# Patient Record
Sex: FEMALE | Race: WHITE | Employment: FULL TIME | URBAN - METROPOLITAN AREA
[De-identification: names, ages, dates, MRNs, and addresses within clinical notes are randomized per-mention and may not be internally consistent; named-entity substitution may affect disease eponyms.]

---

## 2017-01-04 ENCOUNTER — TELEPHONE (OUTPATIENT)
Dept: INTERNAL MEDICINE CLINIC | Facility: CLINIC | Age: 57
End: 2017-01-04

## 2017-01-04 ENCOUNTER — APPOINTMENT (OUTPATIENT)
Dept: LAB | Facility: HOSPITAL | Age: 57
End: 2017-01-04
Attending: INTERNAL MEDICINE
Payer: COMMERCIAL

## 2017-01-04 DIAGNOSIS — R30.0 DYSURIA: Primary | ICD-10-CM

## 2017-01-04 DIAGNOSIS — R30.0 DYSURIA: ICD-10-CM

## 2017-01-04 LAB
BILIRUB UR QL: NEGATIVE
COLOR UR: YELLOW
GLUCOSE UR-MCNC: NEGATIVE MG/DL
KETONES UR-MCNC: NEGATIVE MG/DL
NITRITE UR QL STRIP.AUTO: NEGATIVE
PH UR: 5 [PH] (ref 5–8)
PROT UR-MCNC: NEGATIVE MG/DL
RBC #/AREA URNS AUTO: 7 /HPF
SP GR UR STRIP: 1.02 (ref 1–1.03)
UROBILINOGEN UR STRIP-ACNC: <2
VIT C UR-MCNC: NEGATIVE MG/DL
WBC #/AREA URNS AUTO: 747 /HPF

## 2017-01-04 PROCEDURE — 87186 SC STD MICRODIL/AGAR DIL: CPT

## 2017-01-04 PROCEDURE — 87086 URINE CULTURE/COLONY COUNT: CPT

## 2017-01-04 PROCEDURE — 87077 CULTURE AEROBIC IDENTIFY: CPT

## 2017-01-04 PROCEDURE — 81001 URINALYSIS AUTO W/SCOPE: CPT

## 2017-01-04 NOTE — TELEPHONE ENCOUNTER
[facility] for further triage    Please transfer x [phone] until 4:45pm or [phone] anytime. Thank you.

## 2017-01-04 NOTE — TELEPHONE ENCOUNTER
[facility]    Please transfer x [phone] until 5pm or [phone] anytime. Thank you. To relay MD message below. U/A and C/S orders pended for pt notification.

## 2017-01-04 NOTE — TELEPHONE ENCOUNTER
Pt contacted and MD message relayed to pt. Order for U/A and C/S sent and pt verbalized that she will give her urine today. Pt provided with the information for the Uristat and instructed how to take the medication.     Instructed pt to continue drinking

## 2017-01-04 NOTE — TELEPHONE ENCOUNTER
Patient calling and states she has been experiencing symptoms of UTI on and off over the past 3 weeks. Denied appt but is requesting to speak with RN or  about this.

## 2017-01-04 NOTE — TELEPHONE ENCOUNTER
UA and culture and sensitivity. Stop all alcohol including wine. Minimal caffeine. Continue to drink plenty of fluids. Uristat 1 tablet 2 times daily over-the-counter till culture and sensitivities obtained. Call if any fevers or chills or back pain.

## 2017-01-04 NOTE — TELEPHONE ENCOUNTER
Please note/advise. Thank you. Pt states that she was treated for a UTI about 3 weeks but she is still having sl pressure pain, urgency, frequency, dysuria on urination. Pt states that the pain intermittently wakes her up at night.     Pt states that she

## 2017-01-12 ENCOUNTER — TELEPHONE (OUTPATIENT)
Dept: INTERNAL MEDICINE CLINIC | Facility: CLINIC | Age: 57
End: 2017-01-12

## 2017-01-12 DIAGNOSIS — N39.0 RECURRENT UTI: Primary | ICD-10-CM

## 2017-01-12 RX ORDER — AMOXICILLIN AND CLAVULANATE POTASSIUM 875; 125 MG/1; MG/1
1 TABLET, FILM COATED ORAL 2 TIMES DAILY
Qty: 20 TABLET | Refills: 0 | Status: SHIPPED | OUTPATIENT
Start: 2017-01-12 | End: 2017-02-06

## 2017-01-12 NOTE — TELEPHONE ENCOUNTER
rx sent to pharm. Pt contacted. appt set for 1/16. Pt would like to know if she is going to  be referred to urology as this is her 3rd round of antibiotics for UTIs since Nov.  And earlier she was hospitalized for a UTI.   If she is being referred, does

## 2017-01-12 NOTE — TELEPHONE ENCOUNTER
Talked to the patient. She tells me that urology referral was suggested by the nursing staff and she did not request it.   Explained that the current infection is an enterococcus species not aggressive enough to even need treatment but she is being placed

## 2017-01-12 NOTE — TELEPHONE ENCOUNTER
Pt stts has hx of UTI's . 2 weeks ago started again with pain, burning, pressure, urgency and frequency. No fever. Taking Uristat. Still has frequency and lower back pain  but better then before. Had UA done 1 week ago and is waiting for results. Please rev

## 2017-01-12 NOTE — TELEPHONE ENCOUNTER
Patient reports experiencing symptoms of recurring UTI. She has history of UTI and kidney infection. She reports having recently urinalysis and culture performed but has not yet received response regarding results of that testing performed 1 week ago.  Resu

## 2017-01-16 ENCOUNTER — OFFICE VISIT (OUTPATIENT)
Dept: INTERNAL MEDICINE CLINIC | Facility: CLINIC | Age: 57
End: 2017-01-16

## 2017-01-16 VITALS
WEIGHT: 166 LBS | BODY MASS INDEX: 32.59 KG/M2 | HEIGHT: 60 IN | HEART RATE: 73 BPM | SYSTOLIC BLOOD PRESSURE: 109 MMHG | DIASTOLIC BLOOD PRESSURE: 71 MMHG | RESPIRATION RATE: 16 BRPM

## 2017-01-16 DIAGNOSIS — R19.7 DIARRHEA, UNSPECIFIED TYPE: ICD-10-CM

## 2017-01-16 DIAGNOSIS — N39.0 RECURRENT UTI: Primary | ICD-10-CM

## 2017-01-16 PROCEDURE — 99214 OFFICE O/P EST MOD 30 MIN: CPT | Performed by: INTERNAL MEDICINE

## 2017-01-16 PROCEDURE — 99212 OFFICE O/P EST SF 10 MIN: CPT | Performed by: INTERNAL MEDICINE

## 2017-01-16 RX ORDER — FLUCONAZOLE 150 MG/1
TABLET ORAL
Qty: 2 TABLET | Refills: 0 | Status: ON HOLD | OUTPATIENT
Start: 2017-01-16 | End: 2017-02-09

## 2017-01-16 NOTE — PATIENT INSTRUCTIONS
Problem List Items Addressed This Visit        Unprioritized    Diarrhea     Intermittent episodes of diarrhea about 1-2 loose bowel movements per day. Has lower abdominal discomfort and rectal discomfort. Last seen by gastroenterology in spring 2016.   Kevin Waller

## 2017-01-16 NOTE — PROGRESS NOTES
HPI:    Patient ID: Radha Aggarwal is a 64year old female. UTI  This is a recurrent problem.  Progression since onset: has been drinking a lot of water and feels well when hydrated but has had symptoms of bladder pressure and slight burn with urination (1.524 m), weight 166 lb (75.297 kg), not currently breastfeeding. Body mass index is 32.42 kg/(m^2).    Wt Readings from Last 6 Encounters:  01/16/17 : 166 lb (75.297 kg)  12/22/16 : 162 lb 9.6 oz (73.755 kg)  11/10/16 : 158 lb (71.668 kg)  11/03/16 : 152 gastroenterology in spring 2016. We will consider repeating a stool for culture and C. difficile to evaluate for any occult infections  Continue on probiotics 1-2 times daily as discussed.          Relevant Orders    GI STOOL PANEL BY PCR    Recurrent UTI

## 2017-02-06 ENCOUNTER — APPOINTMENT (OUTPATIENT)
Dept: LAB | Facility: HOSPITAL | Age: 57
End: 2017-02-06
Attending: INTERNAL MEDICINE
Payer: COMMERCIAL

## 2017-02-06 ENCOUNTER — TELEPHONE (OUTPATIENT)
Dept: INTERNAL MEDICINE CLINIC | Facility: CLINIC | Age: 57
End: 2017-02-06

## 2017-02-06 DIAGNOSIS — R35.0 URINARY FREQUENCY: ICD-10-CM

## 2017-02-06 DIAGNOSIS — R35.0 URINARY FREQUENCY: Primary | ICD-10-CM

## 2017-02-06 LAB
BILIRUB UR QL: NEGATIVE
COLOR UR: YELLOW
GLUCOSE UR-MCNC: NEGATIVE MG/DL
KETONES UR-MCNC: NEGATIVE MG/DL
NITRITE UR QL STRIP.AUTO: POSITIVE
PH UR: 6 [PH] (ref 5–8)
PROT UR-MCNC: NEGATIVE MG/DL
RBC #/AREA URNS AUTO: 4 /HPF
SP GR UR STRIP: 1.01 (ref 1–1.03)
UROBILINOGEN UR STRIP-ACNC: <2
VIT C UR-MCNC: NEGATIVE MG/DL
WBC #/AREA URNS AUTO: 199 /HPF

## 2017-02-06 PROCEDURE — 87086 URINE CULTURE/COLONY COUNT: CPT

## 2017-02-06 PROCEDURE — 87088 URINE BACTERIA CULTURE: CPT

## 2017-02-06 PROCEDURE — 81001 URINALYSIS AUTO W/SCOPE: CPT

## 2017-02-06 PROCEDURE — 87186 SC STD MICRODIL/AGAR DIL: CPT

## 2017-02-06 RX ORDER — AMOXICILLIN AND CLAVULANATE POTASSIUM 875; 125 MG/1; MG/1
1 TABLET, FILM COATED ORAL 2 TIMES DAILY
Qty: 20 TABLET | Refills: 0 | Status: SHIPPED | OUTPATIENT
Start: 2017-02-06 | End: 2017-02-08

## 2017-02-06 NOTE — TELEPHONE ENCOUNTER
Patient called and informed with understanding. She will completer the urine test today.     Discussed wiping front to back, avoiding sweets and caffeine , no hot tub baths or Tight clothes and stated understood,

## 2017-02-06 NOTE — TELEPHONE ENCOUNTER
Reason for Call/Chief Complaint:  urinary frequency,  dysuria  Onset: Friday 2/2/3/17  Nursing Assessment/Associated Symptoms:   Afebrile.  Urinary frequency, dysuria, + low back pain, denies hematuria  Pt started AZO on Saturday 2/4/17 and has amoxicillin

## 2017-02-06 NOTE — TELEPHONE ENCOUNTER
Pt stts she his having symptoms of an UTI again   Pt stts she has had a few of them in the last couple months  Pt stts she has the [address] already and can she just use that or does she need to have a culture   Please advise

## 2017-02-08 ENCOUNTER — HOSPITAL ENCOUNTER (INPATIENT)
Facility: HOSPITAL | Age: 57
LOS: 1 days | Discharge: HOME | DRG: 690 | End: 2017-02-09
Attending: HOSPITALIST | Admitting: HOSPITALIST
Payer: COMMERCIAL

## 2017-02-08 ENCOUNTER — TELEPHONE (OUTPATIENT)
Dept: INTERNAL MEDICINE CLINIC | Facility: CLINIC | Age: 57
End: 2017-02-08

## 2017-02-08 PROCEDURE — 99222 1ST HOSP IP/OBS MODERATE 55: CPT | Performed by: HOSPITALIST

## 2017-02-08 NOTE — TELEPHONE ENCOUNTER
Reason for Call/Chief Complaint: Stomach cramping, upper abdominal pain, vomiting and liquid stools, UTI Sx returned with burning and pressure, possible reaction to Abx?   Onset: Monday  Nursing Assessment/Associated Symptoms:  Pt states that she started th verbalizes understanding and agrees with plan.

## 2017-02-08 NOTE — TELEPHONE ENCOUNTER
Pt directed. Advised to discontinue the amoxicillin. Change to Bactrim DS as she has tolerated that in the recent past.  Medication sent into the pharmacy.   Advised to call back in about 2 days

## 2017-02-08 NOTE — TELEPHONE ENCOUNTER
Per Tiana Mc, admission nurse, no beds available. Pt will need to go through ER. Pt contacted. Stts she does not want to go through ER. It will cost her extra $200 and she will have to wait for 4 hours. Any other recommendations?

## 2017-02-08 NOTE — TELEPHONE ENCOUNTER
[facility]    Please transfer x [phone] Aline Croft RN) until 5pm today or [phone] anytime. Thank you.

## 2017-02-08 NOTE — TELEPHONE ENCOUNTER
Sue Fox from the lab called in w/STAT urine culture results:  2/8/2017  3:04 PM - [facility]       Culture      >100,000 CFU/ML Escherichia coli  ESBL Pos (A)       LUCY made aware.   LUCY spoke w/pt via phone to discuss need for hospital admission for

## 2017-02-08 NOTE — TELEPHONE ENCOUNTER
Pt said that after taking the antibiotics she began to have diarrhea and started throwing up. Pt said that she has never had a problem with taking the antibiotic before.

## 2017-02-08 NOTE — TELEPHONE ENCOUNTER
Patient contacted–will need a direct admission–please check if admitting nurse will be able to get as a bed for this patient. Failure of outpatient antibiotic treatment–resistant to medications.   ESBL E. coli on urine culture

## 2017-02-09 VITALS
SYSTOLIC BLOOD PRESSURE: 103 MMHG | TEMPERATURE: 98 F | OXYGEN SATURATION: 97 % | DIASTOLIC BLOOD PRESSURE: 51 MMHG | BODY MASS INDEX: 30.71 KG/M2 | HEIGHT: 60 IN | HEART RATE: 79 BPM | RESPIRATION RATE: 18 BRPM | WEIGHT: 156.44 LBS

## 2017-02-09 LAB
ALBUMIN SERPL BCP-MCNC: 3.1 G/DL (ref 3.5–4.8)
ALBUMIN/GLOB SERPL: 1.1 {RATIO} (ref 1–2)
ALP SERPL-CCNC: 54 U/L (ref 32–100)
ALT SERPL-CCNC: 15 U/L (ref 14–54)
ANION GAP SERPL CALC-SCNC: 6 MMOL/L (ref 0–18)
AST SERPL-CCNC: 15 U/L (ref 15–41)
BASOPHILS # BLD: 0.1 K/UL (ref 0–0.2)
BASOPHILS NFR BLD: 1 %
BILIRUB SERPL-MCNC: 0.4 MG/DL (ref 0.3–1.2)
BUN SERPL-MCNC: 10 MG/DL (ref 8–20)
BUN/CREAT SERPL: 13 (ref 10–20)
CALCIUM SERPL-MCNC: 8.5 MG/DL (ref 8.5–10.5)
CHLORIDE SERPL-SCNC: 110 MMOL/L (ref 95–110)
CO2 SERPL-SCNC: 25 MMOL/L (ref 22–32)
CREAT SERPL-MCNC: 0.77 MG/DL (ref 0.5–1.5)
EOSINOPHIL # BLD: 0.1 K/UL (ref 0–0.7)
EOSINOPHIL NFR BLD: 3 %
ERYTHROCYTE [DISTWIDTH] IN BLOOD BY AUTOMATED COUNT: 13 % (ref 11–15)
GLOBULIN PLAS-MCNC: 2.9 G/DL (ref 2.5–3.7)
GLUCOSE SERPL-MCNC: 96 MG/DL (ref 70–99)
HCT VFR BLD AUTO: 37.2 % (ref 35–48)
HGB BLD-MCNC: 12.4 G/DL (ref 12–16)
LYMPHOCYTES # BLD: 2.3 K/UL (ref 1–4)
LYMPHOCYTES NFR BLD: 39 %
MCH RBC QN AUTO: 29 PG (ref 27–32)
MCHC RBC AUTO-ENTMCNC: 33.3 G/DL (ref 32–37)
MCV RBC AUTO: 87 FL (ref 80–100)
MONOCYTES # BLD: 0.6 K/UL (ref 0–1)
MONOCYTES NFR BLD: 10 %
NEUTROPHILS # BLD AUTO: 2.9 K/UL (ref 1.8–7.7)
NEUTROPHILS NFR BLD: 48 %
OSMOLALITY UR CALC.SUM OF ELEC: 291 MOSM/KG (ref 275–295)
PLATELET # BLD AUTO: 315 K/UL (ref 140–400)
PMV BLD AUTO: 7.5 FL (ref 7.4–10.3)
POTASSIUM SERPL-SCNC: 3.8 MMOL/L (ref 3.3–5.1)
PROT SERPL-MCNC: 6 G/DL (ref 5.9–8.4)
RBC # BLD AUTO: 4.28 M/UL (ref 3.7–5.4)
SODIUM SERPL-SCNC: 141 MMOL/L (ref 136–144)
WBC # BLD AUTO: 5.9 K/UL (ref 4–11)

## 2017-02-09 RX ORDER — ONDANSETRON 2 MG/ML
4 INJECTION INTRAMUSCULAR; INTRAVENOUS EVERY 6 HOURS PRN
Status: DISCONTINUED | OUTPATIENT
Start: 2017-02-09 | End: 2017-02-09

## 2017-02-09 RX ORDER — ACETAMINOPHEN 325 MG/1
650 TABLET ORAL EVERY 6 HOURS PRN
Status: DISCONTINUED | OUTPATIENT
Start: 2017-02-09 | End: 2017-02-09

## 2017-02-09 RX ORDER — SODIUM CHLORIDE 9 MG/ML
INJECTION, SOLUTION INTRAVENOUS CONTINUOUS
Status: DISCONTINUED | OUTPATIENT
Start: 2017-02-09 | End: 2017-02-09

## 2017-02-09 RX ORDER — NITROFURANTOIN 25; 75 MG/1; MG/1
100 CAPSULE ORAL 2 TIMES DAILY
Qty: 10 CAPSULE | Refills: 0 | Status: SHIPPED | OUTPATIENT
Start: 2017-02-09 | End: 2017-02-14

## 2017-02-09 RX ORDER — SODIUM CHLORIDE 0.9 % (FLUSH) 0.9 %
SYRINGE (ML) INJECTION
Status: COMPLETED
Start: 2017-02-09 | End: 2017-02-09

## 2017-02-09 RX ORDER — ENOXAPARIN SODIUM 100 MG/ML
40 INJECTION SUBCUTANEOUS DAILY
Status: DISCONTINUED | OUTPATIENT
Start: 2017-02-09 | End: 2017-02-09

## 2017-02-09 NOTE — PLAN OF CARE
Patient/Family Goals    • Patient/Family Long Term Goal Progressing    • Patient/Family Short Term Goal Progressing          No c/o of pain. Post void residual 7mL. VSS. Patient to be discharged home today. Discharge and follow up instructions reviewed.  Al

## 2017-02-09 NOTE — CONSULTS
INFECTIOUS DISEASE CONSULT NOTE    Valenciaanalisa Shukla Patient Status:  Inpatient    2/15/1960 MRN [id number]   Location [facility] 5SW/SE Attending Blaise Berry MD   [facility] Day # 1 PCP Mitali Swenson Floater, vitreous 10/7/2014   • Presbyopia OU 10/7/2014   • Age-related nuclear cataract of both eyes 10/7/2014   • MGD (meibomian gland dysfunction) 10/7/2014   • Recurrent cold sores    • Lipid screening 5/31/2014     per NG   • Vertigo    • E-coli UTI temperature source Oral, resp. rate 18, height 5' (1.524 m), weight 156 lb 7 oz (70.96 kg), SpO2 97 %, not currently breastfeeding. General: Alert, oriented, NAD  HEENT: Moist mucous membranes. Neck: No lymphadenopathy. Supple.   Respiratory: Clear to mg PO BI x5 days  - has follow up with Urology next Wednesay  - consider Urogyn eval as outpatient  - reviewed labs, micro, imaging reports  - discussed with patient, sister, RN, Dr. Marshall Ryan    Thank you for allowing me to participate in the care of this pa

## 2017-02-09 NOTE — H&P
[facility]  Patient Status:  Inpatient    2/15/1960 MRN [id number]   Location [facility] 5SW/SE Attending Sana John MD   Hosp Day # 1 PCP Mark Regalado MD     Date:  2017  Date of Sister      breast  cancer 2015   • Gastro-Intestinal Disorder Brother      Celiac disease   • Hypertension Other      Family H/o   • Thyroid disease Other      Family H/o: Grave's disease [Most of siblings (8)][   • Diabetes Other      Nephew   • Glaucoma thyromegaly. Respiratory:  Lungs are clear to auscultation, respirations are non-labored, breath sounds are equal, symmetrical chest wall expansion. Cardiovascular:  Normal rate, regular rhythm, no murmur, no edema.   Gastrointestinal:  Soft, non-tender,

## 2017-02-09 NOTE — TELEPHONE ENCOUNTER
Pharmacy was contacted and informed of Dr. Jeni Wheeler message below.  The pharmacist verbalized understanding

## 2017-02-09 NOTE — PROGRESS NOTES
[facility]    Progress Note    Nathalia Ho Patient Status:  Inpatient    2/15/1960 MRN [id number]   Location [facility] 5SW/SE Attending Anuel Mcdaniels MD   Hosp Day # 1 PCP Yulia Reyes MD     Subjective:     Jersontio 02/09/2017   K 3.8 02/09/2017    02/09/2017   CO2 25 02/09/2017   GLU 96 02/09/2017   CA 8.5 02/09/2017   ALB 3.1* 02/09/2017   ALKPHO 54 02/09/2017   BILT 0.4 02/09/2017   TP 6.0 02/09/2017   AST 15 02/09/2017   ALT 15 02/09/2017   TSH 1.94 05/31/20

## 2017-02-09 NOTE — DISCHARGE SUMMARY
[facility]    Discharge Summary    Francia Loyd Patient Status:  Inpatient    2/15/1960 MRN [id number]   Location [facility] 5SW/SE Attending Oamira Luz MD   Hosp Day # 1 PCP Katharine Guzmán MD     Date of Admission:  progressive worsening aggravated despite drinking plenty of fluids and cranberry juice.  She denies any relieving factors describes her symptoms as similar to her previous episodes of UTI.  She is seen her primary care physician UA done in the office indic [address] [facility], [address] [place] [id number]     Phone:  [phone]    - Nitrofurantoin Monohyd Macro 100 MG Caps              [facility]  2/9/2017  3:18 PM    Greater than 30 minutes spent on preparation and coordination of this discharge

## 2017-02-09 NOTE — DISCHARGE PLANNING
RN states pt may need iv abx at dc. ID consult is pending with Dr. Francia Chávez. SW sent tentative referral to [facility] to check pt's options, in case the abx are needed.     josiah grace,LSW [phone]

## 2017-02-09 NOTE — TELEPHONE ENCOUNTER
Annette stts that they received a script for Sulfamethoxazole-TMP -160 MG Oral Tab per tablet & Nitrofurantoin Monohyd Macro 100 MG Oral Cap which are both used for treating bladder infections. Should they dispense both?

## 2017-02-10 ENCOUNTER — TELEPHONE (OUTPATIENT)
Dept: INTERNAL MEDICINE UNIT | Facility: HOSPITAL | Age: 57
End: 2017-02-10

## 2017-02-10 NOTE — TELEPHONE ENCOUNTER
I spoke with Pt to schedule a HFU visit with Dr Ganesh Chapa but the Dr schedule has no openings until mid March . Dr Ganesh Chapa not in office so i will contact her on Monday and call pt back after speaking with Dr Bao Gunn.

## 2017-02-10 NOTE — TELEPHONE ENCOUNTER
Please schedule patient for a hospital follow up appointment with pcp . Pt was discharged on 2/9/17 .

## 2017-02-15 ENCOUNTER — LAB ENCOUNTER (OUTPATIENT)
Dept: LAB | Facility: HOSPITAL | Age: 57
End: 2017-02-15
Attending: UROLOGY
Payer: COMMERCIAL

## 2017-02-15 DIAGNOSIS — N39.0 URINARY TRACT INFECTION, SITE NOT SPECIFIED: Primary | ICD-10-CM

## 2017-02-15 PROCEDURE — 87086 URINE CULTURE/COLONY COUNT: CPT

## 2017-02-16 ENCOUNTER — TELEPHONE (OUTPATIENT)
Dept: INTERNAL MEDICINE CLINIC | Facility: CLINIC | Age: 57
End: 2017-02-16

## 2017-02-16 DIAGNOSIS — N39.0 RECURRENT UTI: Primary | ICD-10-CM

## 2017-02-16 NOTE — TELEPHONE ENCOUNTER
Received fax from [employer] requesting authorized referral for an in-office Cystoscopy on 02/21/2017 w/Dr. Ra Warner to be faxed to [phone]. (ph#: [phone])  (CPT: 68889, 74100, 30934  Dx: N39.0)    LUCY - please advise if ok to generate (re

## 2017-02-21 NOTE — TELEPHONE ENCOUNTER
Pt called in stating Dr. Anne Adrian office did not receive the fax for the referral, pt is wondering if it can be refaxed? Pt is also wondering if she can have her referral sent to her via [employer], please.

## 2017-03-03 ENCOUNTER — TELEPHONE (OUTPATIENT)
Dept: INTERNAL MEDICINE CLINIC | Facility: CLINIC | Age: 57
End: 2017-03-03

## 2017-03-03 NOTE — TELEPHONE ENCOUNTER
Reason for Call/Chief Complaint: Patient states has deep cough for last few days - feels like bronchitis   Onset: Last weekend   Nursing Assessment/Associated Symptoms: Patient states last weekend started with cough and as the days have continued has mary kay

## 2017-03-03 NOTE — TELEPHONE ENCOUNTER
Pt calling regarding having a barking cough for the last four day and also has a fever. Pt wants to know what she should do. .. please advise

## 2017-03-06 ENCOUNTER — OFFICE VISIT (OUTPATIENT)
Dept: DERMATOLOGY CLINIC | Facility: CLINIC | Age: 57
End: 2017-03-06

## 2017-03-06 DIAGNOSIS — L25.9 CONTACT DERMATITIS AND ECZEMA: ICD-10-CM

## 2017-03-06 DIAGNOSIS — D23.70 BENIGN NEOPLASM OF SKIN OF LOWER LIMB, INCLUDING HIP, UNSPECIFIED LATERALITY: ICD-10-CM

## 2017-03-06 DIAGNOSIS — D23.4 BENIGN NEOPLASM OF SCALP AND SKIN OF NECK: ICD-10-CM

## 2017-03-06 DIAGNOSIS — D23.5 BENIGN NEOPLASM OF SKIN OF TRUNK, EXCEPT SCROTUM: ICD-10-CM

## 2017-03-06 DIAGNOSIS — L81.4 LENTIGO: ICD-10-CM

## 2017-03-06 DIAGNOSIS — D23.30 BENIGN NEOPLASM OF SKIN OF FACE: ICD-10-CM

## 2017-03-06 DIAGNOSIS — L82.1 SEBORRHEIC KERATOSES: Primary | ICD-10-CM

## 2017-03-06 PROCEDURE — 99212 OFFICE O/P EST SF 10 MIN: CPT | Performed by: DERMATOLOGY

## 2017-03-06 PROCEDURE — 99203 OFFICE O/P NEW LOW 30 MIN: CPT | Performed by: DERMATOLOGY

## 2017-03-06 RX ORDER — NITROFURANTOIN 25; 75 MG/1; MG/1
CAPSULE ORAL
Refills: 0 | COMMUNITY
Start: 2017-02-09 | End: 2017-03-06 | Stop reason: ALTCHOICE

## 2017-03-20 NOTE — PROGRESS NOTES
Inderjit Mcmillan is a 62year old female. HPI:     CC:  Patient presents with:  Full Skin Exam: LOV 10/14/2013. Pt presenting for full body skin exam. Pt denies personal hx of skin cancer. Family hx of unknown type of skin cancer (father).         Allergies tablet Rfl: 0   Oseltamivir Phosphate (TAMIFLU) 75 MG Oral Cap Take 1 capsule (75 mg total) by mouth 2 (two) times daily.  Disp: 10 capsule Rfl: 0   benzonatate 200 MG Oral Cap Take 1 capsule (200 mg total) by mouth 3 (three) times daily as needed for cough Cancer Sister      breast  cancer 2015   • Gastro-Intestinal Disorder Brother      Celiac disease   • Hypertension Other      Family H/o   • Thyroid disease Other      Family H/o: Grave's disease [Most of siblings (8)][   • Diabetes Other      Nephew   • G additional history and physical exam also:    Assessment / plan:    No orders of the defined types were placed in this encounter.        Meds & Refills for this Visit:  No prescriptions requested or ordered in this encounter         Seborrheic keratoses  (p

## 2017-04-03 ENCOUNTER — TELEPHONE (OUTPATIENT)
Dept: INTERNAL MEDICINE CLINIC | Facility: CLINIC | Age: 57
End: 2017-04-03

## 2017-04-03 DIAGNOSIS — N39.0 RECURRENT UTI: ICD-10-CM

## 2017-04-03 DIAGNOSIS — N30.00 ACUTE CYSTITIS WITHOUT HEMATURIA: Primary | ICD-10-CM

## 2017-04-03 NOTE — TELEPHONE ENCOUNTER
Actions Requested: Rquesting referral and/or advice from LUCY  Situation/Background   Problem: Symptoms of UTI   Onset: 5 days ago   Associated Symptoms: Urgency, frequency, no dysuria, urine is strong smelling.  Denies fever, but is c/o h/a and mild queasin

## 2017-04-03 NOTE — TELEPHONE ENCOUNTER
Pt states she has another possible uti  Said Dr Zander Martinez/Urology told her to contact his office  if problems  Has HMO needs another referral- not sure if should contact Dr Santi Macdonald first

## 2017-04-03 NOTE — TELEPHONE ENCOUNTER
Spoke to pt, informed her that referral has been generated and authorized. She will contact the office for an appt asap. She has not further questions or concerns at this time.

## 2017-04-05 ENCOUNTER — LAB ENCOUNTER (OUTPATIENT)
Dept: LAB | Facility: HOSPITAL | Age: 57
End: 2017-04-05
Attending: UROLOGY
Payer: COMMERCIAL

## 2017-04-05 DIAGNOSIS — N39.0 UTI (URINARY TRACT INFECTION): Primary | ICD-10-CM

## 2017-04-05 PROCEDURE — 87086 URINE CULTURE/COLONY COUNT: CPT

## 2017-04-05 PROCEDURE — 87186 SC STD MICRODIL/AGAR DIL: CPT

## 2017-04-14 ENCOUNTER — TELEPHONE (OUTPATIENT)
Dept: GASTROENTEROLOGY | Facility: CLINIC | Age: 57
End: 2017-04-14

## 2017-04-14 NOTE — TELEPHONE ENCOUNTER
Last Procedure: 2/27/2012   Last Diagnosis:  Personal history of colon polyps (tubular adenoma)  Recalled for (years): 5 years  Sedation used previously:  IV  Last Prep Used (if known):  Miralax  Quality of prep (if known):  Prep was excellent  Anticoagula

## 2017-04-14 NOTE — TELEPHONE ENCOUNTER
Patient sent message threw my chart to see about having a colonoscopy  Last colonos.  06-12 every 5 years please advise and call her

## 2017-04-27 ENCOUNTER — LAB ENCOUNTER (OUTPATIENT)
Dept: LAB | Facility: HOSPITAL | Age: 57
End: 2017-04-27
Attending: UROLOGY
Payer: COMMERCIAL

## 2017-04-27 DIAGNOSIS — N39.0 URINARY TRACT INFECTION, SITE NOT SPECIFIED: ICD-10-CM

## 2017-04-27 DIAGNOSIS — N13.9 ACUTE BILATERAL OBSTRUCTIVE UROPATHY: Primary | ICD-10-CM

## 2017-04-27 PROCEDURE — 87186 SC STD MICRODIL/AGAR DIL: CPT

## 2017-04-27 PROCEDURE — 87077 CULTURE AEROBIC IDENTIFY: CPT

## 2017-04-27 PROCEDURE — 87086 URINE CULTURE/COLONY COUNT: CPT

## 2017-05-17 ENCOUNTER — LAB ENCOUNTER (OUTPATIENT)
Dept: LAB | Facility: HOSPITAL | Age: 57
End: 2017-05-17
Attending: UROLOGY
Payer: COMMERCIAL

## 2017-05-17 DIAGNOSIS — N39.0 UTI (URINARY TRACT INFECTION): Primary | ICD-10-CM

## 2017-05-17 PROCEDURE — 87086 URINE CULTURE/COLONY COUNT: CPT

## 2017-05-17 PROCEDURE — 87077 CULTURE AEROBIC IDENTIFY: CPT

## 2017-05-17 PROCEDURE — 87186 SC STD MICRODIL/AGAR DIL: CPT

## 2017-05-19 NOTE — TELEPHONE ENCOUNTER
Spoke with pt today to follow-up on below request from 4/25. Pt states she will call us back after she reviews her schedule in one week. 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  Reviewed booking into July as of today

## 2017-05-22 ENCOUNTER — TELEPHONE (OUTPATIENT)
Dept: INTERNAL MEDICINE CLINIC | Facility: CLINIC | Age: 57
End: 2017-05-22

## 2017-05-22 NOTE — TELEPHONE ENCOUNTER
Pt states Dr Ulysses Morton (urologist) office told her to get referral to infectious disease in regards to her being positive for E. Coli ESBL.  has been in the ER for this twice already. States is asymptomatic. Asking for referral to Infectious disease.

## 2017-05-22 NOTE — TELEPHONE ENCOUNTER
Pt calling regarding seeing Dr. Mckay Cheney regarding recurring UTI and pt states she became positive LBS1. .. please advise

## 2017-05-23 ENCOUNTER — TELEPHONE (OUTPATIENT)
Dept: INTERNAL MEDICINE CLINIC | Facility: CLINIC | Age: 57
End: 2017-05-23

## 2017-05-23 NOTE — TELEPHONE ENCOUNTER
Pt is in need for a referral for infectious disease  Pt stts her Urologist Dr. Geovanna Campos advised her of this  Please see encounter on 5/22 (Acute)

## 2017-05-24 ENCOUNTER — TELEPHONE (OUTPATIENT)
Dept: INTERNAL MEDICINE CLINIC | Facility: CLINIC | Age: 57
End: 2017-05-24

## 2017-05-24 RX ORDER — NITROFURANTOIN 25; 75 MG/1; MG/1
100 CAPSULE ORAL 2 TIMES DAILY
Qty: 28 CAPSULE | Refills: 0 | Status: SHIPPED | OUTPATIENT
Start: 2017-05-24 | End: 2017-06-07

## 2017-05-24 NOTE — TELEPHONE ENCOUNTER
Pt calling states she called for first available with Dr Lisha Murphy on 06/13. Pt states that is very long to wait but states she was informed if her PCP calls the office they can try to get her in sooner. Please call back if able to make sooner appt.

## 2017-05-24 NOTE — TELEPHONE ENCOUNTER
Advised patient of Dr. Holden Birmingham note and information provided. Patient stated that Dr Olivia Jin never treated her with an antibiotic. Wanted to verify with Dr Willem Silva if she should get an antibiotic to treat the current infection?  Patient states that has const

## 2017-05-24 NOTE — TELEPHONE ENCOUNTER
macrobid 1 tab po bid x 2 weeks  ESBL– is an extended spectrum antibiotic resistant infection. Most oral antibiotics are ineffective.   Dyshahram Anuja is intermediate in its activity–and hence will need to take it for a 2 week period of time until she gets an ap

## 2017-05-24 NOTE — TELEPHONE ENCOUNTER
[facility]    Please transfer [id number] anytime. Thank you.     Referral for Infectious Disease completed per MD. Contact information for specialist as follows;    Dr. Swathi Limon (Infectious Disease)  [address]  Ph

## 2017-05-25 NOTE — TELEPHONE ENCOUNTER
RUBINA de la torre/Nika at [facility] requesting earlier appt. The nurses were unavailable to speak to however Romana Amor states she will send a STAT message to the clinical staff and they will call us back to see if they can R/S pt to an earlier date.     Also RUBINA for

## 2017-05-25 NOTE — TELEPHONE ENCOUNTER
Aguilar Zamorano RN from ID office called back and offered earlier appt w/Dr. Jeferson Hurd on 06/01/2017 @ 11am in [address] [place] [address] [facility] LUCY was made aware and gave verbal ok to proceed.     LM for pt to call back, [id number] regarding change in appt to abo

## 2017-05-26 NOTE — TELEPHONE ENCOUNTER
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 Anabela Sorto to contact specialist office directly to change apt if needed.

## 2017-05-31 ENCOUNTER — TELEPHONE (OUTPATIENT)
Dept: INTERNAL MEDICINE CLINIC | Facility: CLINIC | Age: 57
End: 2017-05-31

## 2017-05-31 DIAGNOSIS — N39.0 URINARY TRACT INFECTION, SITE UNSPECIFIED: Primary | ICD-10-CM

## 2017-05-31 NOTE — TELEPHONE ENCOUNTER
Pt states is not able to keep appt with Infectious Doctor, Pt is requesting another referral and doctor.

## 2017-06-01 NOTE — TELEPHONE ENCOUNTER
We do not have infectious disease with [facility].  Patient may try Dr Manny Hernández Fairly  Dr Lew Ashley [phone]    Thank you, Jamshid

## 2017-06-01 NOTE — TELEPHONE ENCOUNTER
Spoke to pt, she was shannan to see the NP today at 3:30.  New ref generated and sent to LUCY for signoff

## 2017-06-01 NOTE — TELEPHONE ENCOUNTER
Here is a listing of infectious disease providers. Patient may select who best fits her schedule.     Thank you, Jamshid

## 2017-06-14 ENCOUNTER — APPOINTMENT (OUTPATIENT)
Dept: LAB | Facility: HOSPITAL | Age: 57
End: 2017-06-14
Attending: INTERNAL MEDICINE
Payer: COMMERCIAL

## 2017-06-14 DIAGNOSIS — N39.0 RECURRENT UTI: ICD-10-CM

## 2017-06-14 DIAGNOSIS — Z22.39 ESBL E. COLI CARRIER: ICD-10-CM

## 2017-06-14 DIAGNOSIS — K59.1 FUNCTIONAL DIARRHEA: ICD-10-CM

## 2017-06-14 PROCEDURE — 87209 SMEAR COMPLEX STAIN: CPT

## 2017-06-14 PROCEDURE — 87086 URINE CULTURE/COLONY COUNT: CPT

## 2017-06-14 PROCEDURE — 87045 FECES CULTURE AEROBIC BACT: CPT

## 2017-06-14 PROCEDURE — 87177 OVA AND PARASITES SMEARS: CPT

## 2017-06-14 PROCEDURE — 81003 URINALYSIS AUTO W/O SCOPE: CPT

## 2017-06-14 PROCEDURE — 87046 STOOL CULTR AEROBIC BACT EA: CPT

## 2017-06-14 PROCEDURE — 87272 CRYPTOSPORIDIUM AG IF: CPT

## 2017-06-14 PROCEDURE — 87329 GIARDIA AG IA: CPT

## 2017-10-21 ENCOUNTER — TELEPHONE (OUTPATIENT)
Dept: INTERNAL MEDICINE CLINIC | Facility: CLINIC | Age: 57
End: 2017-10-21

## 2017-10-23 ENCOUNTER — TELEPHONE (OUTPATIENT)
Dept: GASTROENTEROLOGY | Facility: CLINIC | Age: 57
End: 2017-10-23

## 2017-10-23 DIAGNOSIS — Z86.010 HX OF COLONIC POLYPS: Primary | ICD-10-CM

## 2017-10-23 NOTE — TELEPHONE ENCOUNTER
Pt sent request via My Chart/Scheduling Request and would like to schedule 5 yr repeat colon. Requesting a Thurs or Fri if possible. See also 4/14/17 closed TE. Please call.

## 2017-10-23 NOTE — TELEPHONE ENCOUNTER
See encounter 4/14/2017 pt now calling to schedule colonoscopy     Colonoscopy with IV sedation  Miralax prep  History of colon polyps

## 2017-10-24 NOTE — TELEPHONE ENCOUNTER
CBLM to schedule procedure. Please transfer to Estephaniadavid Ordoñez at ext [phone] or [phone] for scheduling. Or please transfer to [facility] in GI if unavailable.

## 2017-10-24 NOTE — TELEPHONE ENCOUNTER
Scheduled for:  Colonoscopy - 42840  Provider Name:  Dr. SUTTON [facility]  Date:  1/22/18  Location:  [facility]  Sedation:  IV  Time:  10:00 am (pt is aware to arrive at 9:00 am)  Prep:  Miralax/Gatorade, Prep instructions were given to pt over the phone, pt verbalized unders

## 2018-01-17 ENCOUNTER — APPOINTMENT (OUTPATIENT)
Dept: LAB | Facility: HOSPITAL | Age: 58
End: 2018-01-17
Attending: INTERNAL MEDICINE
Payer: COMMERCIAL

## 2018-01-17 ENCOUNTER — TELEPHONE (OUTPATIENT)
Dept: GASTROENTEROLOGY | Facility: CLINIC | Age: 58
End: 2018-01-17

## 2018-01-17 DIAGNOSIS — R31.9 URINARY TRACT INFECTION WITH HEMATURIA, SITE UNSPECIFIED: ICD-10-CM

## 2018-01-17 DIAGNOSIS — B99.9 RECURRENT INFECTIONS: ICD-10-CM

## 2018-01-17 DIAGNOSIS — N39.0 URINARY TRACT INFECTION WITH HEMATURIA, SITE UNSPECIFIED: ICD-10-CM

## 2018-01-17 LAB
BACTERIA UR QL AUTO: NEGATIVE /HPF
BILIRUB UR QL: NEGATIVE
CLARITY UR: CLEAR
COLOR UR: YELLOW
GLUCOSE UR-MCNC: NEGATIVE MG/DL
HGB UR QL STRIP.AUTO: NEGATIVE
KETONES UR-MCNC: NEGATIVE MG/DL
NITRITE UR QL STRIP.AUTO: NEGATIVE
PH UR: 6 [PH] (ref 5–8)
PROT UR-MCNC: NEGATIVE MG/DL
RBC #/AREA URNS AUTO: <1 /HPF
SP GR UR STRIP: 1.01 (ref 1–1.03)
UROBILINOGEN UR STRIP-ACNC: <2
VIT C UR-MCNC: NEGATIVE MG/DL
WBC #/AREA URNS AUTO: <1 /HPF

## 2018-01-17 PROCEDURE — 87086 URINE CULTURE/COLONY COUNT: CPT

## 2018-01-17 PROCEDURE — 81001 URINALYSIS AUTO W/SCOPE: CPT

## 2018-01-17 NOTE — TELEPHONE ENCOUNTER
Pt has colonoscopy scheduled for 1/22/18 and is currently taking macrobid once daily, cranberry, vitamin C, probiotic and B12. Is it OK to take before procedure? Please call.

## 2018-01-22 ENCOUNTER — HOSPITAL ENCOUNTER (OUTPATIENT)
Facility: HOSPITAL | Age: 58
Setting detail: HOSPITAL OUTPATIENT SURGERY
Discharge: HOME | End: 2018-01-22
Attending: INTERNAL MEDICINE | Admitting: INTERNAL MEDICINE
Payer: COMMERCIAL

## 2018-01-22 ENCOUNTER — SURGERY (OUTPATIENT)
Age: 58
End: 2018-01-22

## 2018-01-22 DIAGNOSIS — Z86.010 HX OF COLONIC POLYPS: ICD-10-CM

## 2018-01-22 PROCEDURE — G0500 MOD SEDAT ENDO SERVICE >5YRS: HCPCS | Performed by: INTERNAL MEDICINE

## 2018-01-22 PROCEDURE — 0DBK8ZX EXCISION OF ASCENDING COLON, VIA NATURAL OR ARTIFICIAL OPENING ENDOSCOPIC, DIAGNOSTIC: ICD-10-PCS | Performed by: INTERNAL MEDICINE

## 2018-01-22 PROCEDURE — 45385 COLONOSCOPY W/LESION REMOVAL: CPT | Performed by: INTERNAL MEDICINE

## 2018-01-22 RX ORDER — MIDAZOLAM HYDROCHLORIDE 1 MG/ML
INJECTION INTRAMUSCULAR; INTRAVENOUS
Status: DISCONTINUED | OUTPATIENT
Start: 2018-01-22 | End: 2018-01-22

## 2018-01-22 RX ORDER — SODIUM CHLORIDE 0.9 % (FLUSH) 0.9 %
10 SYRINGE (ML) INJECTION AS NEEDED
Status: DISCONTINUED | OUTPATIENT
Start: 2018-01-22 | End: 2018-01-22

## 2018-01-22 RX ORDER — MIDAZOLAM HYDROCHLORIDE 1 MG/ML
1 INJECTION INTRAMUSCULAR; INTRAVENOUS EVERY 5 MIN PRN
Status: DISCONTINUED | OUTPATIENT
Start: 2018-01-22 | End: 2018-01-22

## 2018-01-22 RX ORDER — SODIUM CHLORIDE, SODIUM LACTATE, POTASSIUM CHLORIDE, CALCIUM CHLORIDE 600; 310; 30; 20 MG/100ML; MG/100ML; MG/100ML; MG/100ML
INJECTION, SOLUTION INTRAVENOUS CONTINUOUS
Status: DISCONTINUED | OUTPATIENT
Start: 2018-01-22 | End: 2018-01-22

## 2018-01-22 NOTE — OPERATIVE REPORT
[facility] Endoscopy Report      Date of Procedure:  01/22/18      Preoperative Diagnosis:  1. Personal history of adenomatous colon polyps  2. Colorectal cancer screening      Postoperative Diagnosis:  1. Small ascending colon polyp  2. seen.  Retroflexion in the rectum revealed no abnormalities. The procedure was well tolerated without immediate complication. Impression:  1. Small ascending colon polyp  2. Uncomplicated diverticulosis right and left colon    Recommendations:  1.

## 2018-01-22 NOTE — H&P
History & Physical Examination    Patient Name: Librado Reinoso  MRN: [id number]  [facility]: [id number]  YOB: 1960    Diagnosis: Personal history of adenomatous colon polyps and colorectal cancer screening        Prescriptions Prior to Admission: Smoking status: Never Smoker    Smokeless tobacco: Never Used    Alcohol use Yes  0.0 oz/week     Comment: 1 glass of wine weekly       SYSTEM Check if Review is Normal Check if Physical Exam is Normal If not normal, please explain:   ANABEL Romero.Daniel ] [ X

## 2018-01-23 ENCOUNTER — OFFICE VISIT (OUTPATIENT)
Dept: INTERNAL MEDICINE CLINIC | Facility: CLINIC | Age: 58
End: 2018-01-23

## 2018-01-23 ENCOUNTER — NURSE TRIAGE (OUTPATIENT)
Dept: OTHER | Age: 58
End: 2018-01-23

## 2018-01-23 ENCOUNTER — APPOINTMENT (OUTPATIENT)
Dept: LAB | Facility: HOSPITAL | Age: 58
End: 2018-01-23
Attending: PHYSICIAN ASSISTANT
Payer: COMMERCIAL

## 2018-01-23 VITALS
DIASTOLIC BLOOD PRESSURE: 62 MMHG | HEART RATE: 74 BPM | SYSTOLIC BLOOD PRESSURE: 103 MMHG | HEIGHT: 60 IN | BODY MASS INDEX: 27.09 KG/M2 | WEIGHT: 138 LBS | RESPIRATION RATE: 18 BRPM | OXYGEN SATURATION: 96 %

## 2018-01-23 VITALS
DIASTOLIC BLOOD PRESSURE: 60 MMHG | BODY MASS INDEX: 28 KG/M2 | SYSTOLIC BLOOD PRESSURE: 118 MMHG | TEMPERATURE: 98 F | HEART RATE: 80 BPM | WEIGHT: 143.63 LBS | RESPIRATION RATE: 16 BRPM

## 2018-01-23 DIAGNOSIS — M54.2 NECK PAIN ON RIGHT SIDE: ICD-10-CM

## 2018-01-23 DIAGNOSIS — M54.2 NECK PAIN ON RIGHT SIDE: Primary | ICD-10-CM

## 2018-01-23 LAB — TSH SERPL-ACNC: 0.86 UIU/ML (ref 0.45–5.33)

## 2018-01-23 PROCEDURE — 99212 OFFICE O/P EST SF 10 MIN: CPT | Performed by: PHYSICIAN ASSISTANT

## 2018-01-23 PROCEDURE — 84443 ASSAY THYROID STIM HORMONE: CPT

## 2018-01-23 PROCEDURE — 36415 COLL VENOUS BLD VENIPUNCTURE: CPT

## 2018-01-23 NOTE — TELEPHONE ENCOUNTER
Action Requested: Summary for Provider     []  Critical Lab, Recommendations Needed  [] Need Additional Advice  []   FYI    []   Need Orders  [] Need Medications Sent to Pharmacy  []  Other     SUMMARY: Appointment made with Chandrika Ortiz today at 3:00 Pm

## 2018-01-23 NOTE — PROGRESS NOTES
HPI:    Patient ID: Librado Reinoso is a 62year old female. HPI   Patient presents today with swollen neck glands for the last 2 weeks. 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   Symptoms began with swollen erythema present. Eyes: Conjunctivae are normal. Pupils are equal, round, and reactive to light. Neck: Normal range of motion. Neck supple. Thyromegaly: Mildly prominent thyroid gland.    Cardiovascular: Normal rate, regular rhythm and normal heart soun

## 2018-01-25 ENCOUNTER — TELEPHONE (OUTPATIENT)
Dept: GASTROENTEROLOGY | Facility: CLINIC | Age: 58
End: 2018-01-25

## 2018-01-25 NOTE — TELEPHONE ENCOUNTER
----- Message from Adia Mitchell MD sent at 1/25/2018  3:10 PM CST -----  I left a message on the patient's personal voicemail. She had a solitary small hyperplastic polyp removed. Uncomplicated diverticulosis was present.   I have recommended a hi

## 2018-04-02 NOTE — ASSESSMENT & PLAN NOTE
[facility]  [address] [facility] [id number] [phone]  Dept: [phone]    PRE-OP EVALUATION:  Today's date: 2018    Angela Ibarra (: 1960) presents for pre-operative evaluation assessment as requested by Dr. Bello.  She requires evaluation and anesthesia risk assessment prior to undergoing surgery/procedure for treatment of cataract .    Fax number for surgical facility: ???  Primary Physician: Germán Jernigan  Type of Anesthesia Anticipated: to be determined    Patient has a Health Care Directive or Living Will:  NO    Preop Questions 2018   Who is doing your surgery? dr bello [facility]   What are you having done? cataracts   Date of Surgery/Procedure: -right and -left   Facility or Hospital where procedure/surgery will be performed: [facility]   1.  Do you have a history of Heart attack, stroke, stent, coronary bypass surgery, or other heart surgery? No   2.  Do you ever have any pain or discomfort in your chest? No   3.  Do you have a history of  Heart Failure? No   4.   Are you troubled by shortness of breath when:  walking on a level surface, or up a slight hill, or at night? No   5.  Do you currently have a cold, bronchitis or other respiratory infection? No   6.  Do you have a cough, shortness of breath, or wheezing? No   7.  Do you sometimes get pains in the calves of your legs when you walk? No   8. Do you or anyone in your family have previous history of blood clots? No   9.  Do you or does anyone in your family have a serious bleeding problem such as prolonged bleeding following surgeries or cuts? No   10. Have you ever had problems with anemia or been told to take iron pills? YES -    11. Have you had any abnormal blood loss such as black, tarry or bloody stools, or abnormal vaginal bleeding? No   12. Have you ever had a blood transfusion? No   13. Have you or any of your relatives ever had problems with  Intermittent episodes of diarrhea about 1-2 loose bowel movements per day. Has lower abdominal discomfort and rectal discomfort. Last seen by gastroenterology in spring 2016.   We will consider repeating a stool for culture and C. difficile to evaluate fo "anesthesia? No   14. Do you have sleep apnea, excessive snoring or daytime drowsiness? No   15. Do you have any prosthetic heart valves? No   16. Do you have prosthetic joints? No   17. Is there any chance that you may be pregnant? No         HPI:     Cataracts - has had blurry vision.  Evaluation and treatment:    She is scheduled to have cataract surgery in April 2018.    Right hip osteoarthritis - present since around 2013. Seemed ok after injection. Now back in the last 3 weeks. Interferes with sleep, throbbing at night. No trauma. She feels the pain more on the right groin area. No bulge. There is morning stiffness. Worse with activity? Better with Tylenol. She feels the right leg is weak. No numbness. Not sure about right low back pain. Has h/o right ankle surgery.   Evaluation and treatment:   Saw ortho and steroid injection was helpful 4/13/17 done at [facility].   Repeat steroid injection at [facility] March 2018 was helpful.   Declines PT.    XR HIP RIGHT 2-3 VIEWS 3/31/2017 8:10 AM     HISTORY: Pain in right hip     COMPARISON: None.         IMPRESSION: Mild degenerative changes of the hip. Otherwise negative.        RUBÉN MACE MD    Glucosuria - this was noted during her urology visit on 6/27/17. The glucose was 100 and the rest of the u/a was negative. She has no h/o diabetes and denies symptoms high glucose.  Evaluation and treatment:   This is most like insignificant glucosuria. Serum glucose is fine.    Frequent UTI's/atrophic vaginitis - was happening every other month. Goes away with antibiotic treatment. Symptoms are frequency, burning, as if passing \"clot.\" previous culture grew e coli which was sensitive to all antibiotics tested.  She also has had dryness and painful intercourse. No hematuria. No fevers. She has had hysterectomy.   Bactrim right before or right after intercourse is working well.    She saw as baseline frequency.    The u/a has been normal on multiple occasions.    For " "atrophic vaginitis I had prescribed Estrogen vaginal pill but stopped due to fear of side effects.   Saw urologist - advised to continue Estrogen and consider Detrol    Previous Depression and ongoing insomnia -    Specific type: mild major depression  Duration: since around 2011  Symptoms: decreased mood, mood swings, anhedonia, sleep disturbance, decreased energy.  Compounding factors: \"hates\" her work.   Anxiety: Worries about her kid. Thinks too much at night.  SI or HI: denies  Delusions/hallucinations: denies  Rupali or hypomania now or in the past: denies  Current treatment: Trazodone prn for sleep only.  Compliant: denies  Side effects: denies  Treatments:    Zoloft stopped since not needed   Trazodone helps sometimes.   Referral for counseling previously but she says it does not help   she says she only wants to be on Trazodone prn which is fine.     PHQ-9 SCORE 9/8/2016 3/31/2017 1/23/2018   Total Score - - -   Total Score 0 0 2       DENG-7 SCORE 9/8/2016 3/31/2017 1/23/2018   Total Score - - -   Total Score 0 0 0     HTN - dx'd around: 2009. Not checking at home. Fairly controlled in clinic but could be better.  Evaluation and treatment:    Atenolol 50 mg qd (chosen due to migraines) - no side effects.   Add Norvasc 5 mg qd.    BP Readings from Last 6 Encounters:   04/06/18 138/70   02/16/18 136/80   01/23/18 130/80   09/28/17 132/86   09/11/17 (!) 132/94   09/11/17 134/81         Last Basic Metabolic Panel:  Lab Results   Component Value Date     01/18/2018      Lab Results   Component Value Date    POTASSIUM 4.4 01/18/2018     Lab Results   Component Value Date    CHLORIDE 107 01/18/2018     Lab Results   Component Value Date    BRITANY 8.9 01/18/2018     Lab Results   Component Value Date    CO2 25 01/18/2018     Lab Results   Component Value Date    BUN 17 01/18/2018     Lab Results   Component Value Date    CR 0.90 01/18/2018     Lab Results   Component Value Date    GLC 88 01/18/2018     Multiple " falls and fractures/osteopenia - She informs me that she has fallen about 6 times in as many years. These were related to stepping on a pot hole, stumbling on stairs and sometimes the ankle is weak and gives way. On 9/2/13 she stepped on a pot hole. She was seen in the ED and dx'd with right distal fibular fx and old lateral malleolus fx. She was then managed by sports medicine with CAM walker. On 10/8/13 she was walking her dog when her right ankle gave out and she fell on her left side. She was seen in ED again and dx'd with left ulnar comminuted fracture. She was subsequently seen by ortho and underwent ORIF on 10/15/13. At the end of Nov 2014 she kicked a chair. She was seen in urgent care on 12/8/14 and dx'd with left 5th proximal phalanx fracture. In addition to all this she has had left humerus fx in the past that required ORIF.    Has not fallen since Oct 2013. Previously followed with endocrine. Reclast - yearly. It made her sick so she stopped it. She takes Vitamin 2000 units per day. Takes Calcium over the counter - dose unknown.   DX HIP/PELVIS/SPINE 11/19/2013 8:24 AM  HISTORY: Screening. History of fall.  IMPRESSION  IMPRESSION:  1. The T-score of the lumbar spine in the region of L1-L4 is -1.6.  This correlates with moderate osteopenia. If one looks at the L1  vertebral body alone the T score is -2.5 which correlates with  osteoporosis.  2. The T-score of the right femoral neck is -2.3. This correlates with  severe osteopenia.  3. The T-score of the left femoral neck is -2.2. This correlates with  severe osteopenia.  NOTE: With regards to the hips, the T-score for either the femoral  neck or the total hip is reported, whichever is worse.  JUAN ANTONIO SARMIENTO MD    Balance problem - no h/o CVA or other neurological problems. She feels her balance is better since the ankle has improved after surgery.    B12 deficiency - took liquid B12, 1/2 oz per day previously. Not at this time. Last B12 was normal Oct 2013.      Right Carpal tunnel - No further problems.     Obesity - diet and exercise discussed. Commended her losing weight.    Wt Readings from Last 5 Encounters:   04/06/18 231 lb (104.8 kg)   01/23/18 225 lb (102.1 kg)   09/21/17 200 lb (90.7 kg)   09/11/17 200 lb (90.7 kg)   09/11/17 220 lb (99.8 kg)     Surgical history:     Right ankle surgery   Hysterectomy 2008 due to abnormal PAP, ovaries still in place.    Preventive:    Immunization History   Administered Date(s) Administered     Influenza (IIV3) PF 11/08/2012     Influenza Vaccine IM 3yrs+ 4 Valent IIV4 10/25/2013, 02/04/2015, 09/08/2016, 10/11/2017     TD (ADULT, 7+) 01/01/1988, 06/20/2000     TDAP Vaccine (Adacel) 11/08/2012     Lipids screen:     Recent Labs   Lab Test  01/18/18   0714  03/31/17   0816  08/21/15   1229  08/13/14   0743   CHOL  152  177  191  196   HDL  49*  48*  39*  47*   LDL  82  101*  117  116   TRIG  104  142  175*  164*   CHOLHDLRATIO   --    --   4.9  4.2     Mammogram: negative 2/1/18    PAP: n/a due to hysterectomy    Colonoscopy: 9/28/17 - repeat in 5 years. We are supposed to order it with MAC next time due to tortuous colon.     Advanced Directive: has one at home - she will bring a copy.    SH:    Marital status:  - good relationship  Kids: one  Employment: administrative work  Exercise: biking and walking and swimming  Tobacco: no  Etoh: none  Recreational drugs: none  Caffeine: one diet coke per day        MEDICAL HISTORY:     Patient Active Problem List    Diagnosis Date Noted     Primary osteoarthritis of right hip 02/16/2018     Priority: Medium     Insomnia, unspecified type 09/08/2016     Priority: Medium     Essential hypertension with goal blood pressure less than 140/90 09/08/2016     Priority: Medium     Advanced directives, counseling/discussion 12/29/2015     Priority: Medium     Patient states has Advance Directive and will bring in a copy to clinic.  Susanne Gotti LPN         CARDIOVASCULAR SCREENING; LDL  GOAL LESS THAN 130 08/04/2014     Priority: Medium     Chondromalacia, left ankle and joints of left foot 05/22/2014     Priority: Medium     Pain in joint involving ankle and foot 05/13/2014     Priority: Medium     Abnormal gait 05/13/2014     Priority: Medium     Other postprocedural status(V45.89) 05/13/2014     Priority: Medium     Malunion, fracture 03/21/2014     Priority: Medium     Loose body in ankle and foot joint 03/07/2014     Priority: Medium     Synovitis of ankle 03/07/2014     Priority: Medium     Osteoporosis 11/25/2013     Priority: Medium     Problem list name updated by automated process. Provider to review       Right carpal tunnel syndrome 08/13/2013     Priority: Medium     Right leg weakness 09/12/2011     Priority: Medium     Balance disorder 07/20/2011     Priority: Medium     Thought possibly to be due to low B12 by neuro       Stress incontinence 03/09/2010     Priority: Medium     S/p TOT procedure 4/20/10       Migraine headache 09/18/2008     Priority: Medium     Reduced with atenolol  (Problem list name updated by automated process. Provider to review and confirm.)       Obesity 07/23/2007     Priority: Medium     Gastric bypass  Problem list name updated by automated process. Provider to review       NONSPECIFIC COLITIS 07/23/2007     Priority: Medium     Hosp in 99  Recurrent ? Infectious colitis  No definate signs of inflamatory bowel disease        Past Medical History:   Diagnosis Date     Calculus of kidney      Migraine, unspecified, without mention of intractable migraine without mention of status migrainosus      Other specified iron deficiency anemias      Papanicolaou smear of cervix with low grade squamous intraepithelial lesion (LGSIL) 11/07    Colpo and hyst pathology benign     Unspecified essential hypertension 1999    Essential hypertension     Past Surgical History:   Procedure Laterality Date     ARTHROSCOPY ANKLE  4/22/2014    Procedure: ARTHROSCOPY ANKLE;   Surgeon: Shaun Bhatt DPM;  Location: [facility] OR     C  DELIVERY ONLY      , Low Cervical     C GASTRIC BYPASS,OBESITY,W/SM BOWEL RECONS  10/99     C LIGATE FALLOPIAN TUBE  2000    laparoscopy     COLONOSCOPY WITH CO2 INSUFFLATION N/A 2017    Procedure: COLONOSCOPY WITH CO2 INSUFFLATION;  COLON SCREEN/ YURI;  Surgeon: Cody Arana MD;  Location: [facility] OR     HYSTERECTOMY, PAP NO LONGER INDICATED  2008     LAPAROSCOPIC CHOLECYSTECTOMY  8/3/2012    Procedure: LAPAROSCOPIC CHOLECYSTECTOMY;  Laparoscopic Cholecystectomy ;  Surgeon: Corwin Cabezas MD;  Location: [facility] OR     OPEN REDUCTION INTERNAL FIXATION ANKLE  2014    Procedure: OPEN REDUCTION INTERNAL FIXATION ANKLE;  Surgeon: Shaun Bhatt DPM;  Location: [facility] OR     OPEN REDUCTION INTERNAL FIXATION ELBOW  10/15/2013    Procedure: OPEN REDUCTION INTERNAL FIXATION ELBOW;  OPEN REDUCTION INTERNAL FIXATION LEFT PROXIMAL ULNA;  Surgeon: Artem Last DO;  Location: [facility]     ORTHOPEDIC SURGERY      left shoulder surgery     REMOVE MESH VAGINA  10/10/2011    Procedure:REMOVE MESH VAGINA; Excision of Vaginal Mesh; Surgeon:SERGE SALGADO; Location:[facility] OR     SURGICAL HISTORY OF -   4/20/10    Transobturator midurethral sling     SURGICAL HISTORY OF -   1999    exploratory laparotomy, colitis     SURGICAL HISTORY OF -   4/20/10    Transobturator midurethral sling     TUBAL LIGATION       Current Outpatient Prescriptions   Medication Sig Dispense Refill     traZODone (DESYREL) 50 MG tablet Take 2 tablets (100 mg) by mouth nightly as needed for sleep 180 tablet 3     atenolol (TENORMIN) 50 MG tablet Take 1 tablet (50 mg) by mouth daily 90 tablet 3     Ibuprofen (ADVIL PO) Take  by mouth. prn       OTC products: None, except as noted above    Allergies   Allergen Reactions     Bactrim [Sulfamethoxazole W/Trimethoprim]      itching     Nitrofurantoin Itching      Latex Allergy: NO    Social History    Substance Use Topics     Smoking status: Never Smoker     Smokeless tobacco: Never Used     Alcohol use Yes      Comment: occas     History   Drug Use No       REVIEW OF SYSTEMS:   CONSTITUTIONAL: NEGATIVE for fever, chills, change in weight  INTEGUMENTARY/SKIN: NEGATIVE for worrisome rashes, moles or lesions  EYES: NEGATIVE for vision changes or irritation  ENT/MOUTH: NEGATIVE for ear, mouth and throat problems  RESP: NEGATIVE for significant cough or SOB  BREAST: NEGATIVE for masses, tenderness or discharge  CV: NEGATIVE for chest pain, palpitations or peripheral edema  GI: NEGATIVE for nausea, abdominal pain, heartburn, or change in bowel habits  : NEGATIVE for frequency, dysuria, or hematuria  MUSCULOSKELETAL: NEGATIVE for significant arthralgias or myalgia  NEURO: NEGATIVE for weakness, dizziness or paresthesias  ENDOCRINE: NEGATIVE for temperature intolerance, skin/hair changes  HEME: NEGATIVE for bleeding problems  PSYCHIATRIC: NEGATIVE for changes in mood or affect    EXAM:   /70 (Cuff Size: Adult Large)  Pulse 72  Temp 97.6  F (36.4  C) (Oral)  Resp 16  Wt 231 lb (104.8 kg)  LMP 01/08/2008  SpO2 100%  Breastfeeding? No  BMI 42.25 kg/m2    GENERAL APPEARANCE: healthy, alert and no distress     EYES: EOMI, PERRL     HENT: ear canals and TM's normal and nose and mouth without ulcers or lesions     NECK: no adenopathy, no asymmetry, masses, or scars and thyroid normal to palpation     RESP: lungs clear to auscultation - no rales, rhonchi or wheezes     CV: regular rates and rhythm, normal S1 S2, no S3 or S4 and no murmur, click or rub     ABDOMEN:  soft, nontender, no HSM or masses and bowel sounds normal     MS: extremities normal- no gross deformities noted, no evidence of inflammation in joints, FROM in all extremities.     SKIN: no suspicious lesions or rashes     NEURO: Normal strength and tone, sensory exam grossly normal, mentation intact and speech normal     PSYCH: mentation appears  normal. and affect normal/bright     LYMPHATICS: No cervical adenopathy    DIAGNOSTICS:   No labs or EKG required for low risk surgery (cataract, skin procedure, breast biopsy, etc)    Recent Labs   Lab Test  01/18/18   0714  07/07/17   0922  03/31/17   0816  12/09/15   1452   01/19/11   0750   HGB   --    --   12.7  12.1   < >  14.0   PLT   --    --   315  344   < >  266   INR   --    --    --    --    --   0.91   NA  142  143  142   --    < >   --    POTASSIUM  4.4  4.5  4.2   --    < >   --    CR  0.90  0.93  0.96   --    < >   --    A1C   --   5.9   --    --    --    --     < > = values in this interval not displayed.        IMPRESSION:   Reason for surgery/procedure: cataracts    The proposed surgical procedure is considered LOW risk.    REVISED CARDIAC RISK INDEX  The patient has the following serious cardiovascular risks for perioperative complications such as (MI, PE, VFib and 3  AV Block):  No serious cardiac risks  INTERPRETATION: 0 risks: Class I (very low risk - 0.4% complication rate)    The patient has the following additional risks for perioperative complications:  No identified additional risks      RECOMMENDATIONS:     Assessment and Plan - Decision Making    1. Preop general physical exam  Per HPI    2. Cataract of both eyes, unspecified cataract type  Per HPI    3. Essential hypertension with goal blood pressure less than 140/90  Per HPI  - amLODIPine (NORVASC) 5 MG tablet; Take 1 tablet (5 mg) by mouth daily  Dispense: 90 tablet; Refill: 2      Written instructions given as follows:    Patient Instructions   1. For your blood pressure, please add Amlodipine 5 mg daily.    2. Check the blood pressure at home a couple of times per week - left me know if 140/90 or higher persistently.    3. You can take all your medications as usual including the day of surgery - but only with a sip of water.    4. If all is well, see you in Jan 2019 for a physical with fasting labs.             Signed  Electronically by: ALE WELCH MD    Copy of this evaluation report is provided to requesting physician.    [facility] Preop Guidelines

## 2018-10-25 ENCOUNTER — TELEPHONE (OUTPATIENT)
Dept: INTERNAL MEDICINE CLINIC | Facility: CLINIC | Age: 58
End: 2018-10-25

## 2018-10-25 DIAGNOSIS — Z12.31 VISIT FOR SCREENING MAMMOGRAM: Primary | ICD-10-CM

## 2018-10-25 NOTE — TELEPHONE ENCOUNTER
Last office visit was in January 2017–please advise an office visit if needs to have the mammogram orders

## 2018-10-25 NOTE — TELEPHONE ENCOUNTER
Pt was called back and notified regarding LUCY's message below, w/ verbalized understanding. Has appt w/LUCY on 11/28/18.

## 2018-11-28 ENCOUNTER — OFFICE VISIT (OUTPATIENT)
Dept: INTERNAL MEDICINE CLINIC | Facility: CLINIC | Age: 58
End: 2018-11-28

## 2018-11-28 ENCOUNTER — APPOINTMENT (OUTPATIENT)
Dept: LAB | Facility: HOSPITAL | Age: 58
End: 2018-11-28
Attending: INTERNAL MEDICINE
Payer: COMMERCIAL

## 2018-11-28 VITALS
TEMPERATURE: 98 F | HEART RATE: 73 BPM | SYSTOLIC BLOOD PRESSURE: 122 MMHG | WEIGHT: 159.38 LBS | HEIGHT: 60.5 IN | RESPIRATION RATE: 20 BRPM | BODY MASS INDEX: 30.48 KG/M2 | DIASTOLIC BLOOD PRESSURE: 72 MMHG

## 2018-11-28 DIAGNOSIS — H25.13 AGE-RELATED NUCLEAR CATARACT OF BOTH EYES: ICD-10-CM

## 2018-11-28 DIAGNOSIS — K90.0 CELIAC DISEASE: ICD-10-CM

## 2018-11-28 DIAGNOSIS — D22.9 ATYPICAL NEVI: ICD-10-CM

## 2018-11-28 DIAGNOSIS — N39.0 RECURRENT UTI: ICD-10-CM

## 2018-11-28 DIAGNOSIS — Z00.00 ROUTINE PHYSICAL EXAMINATION: ICD-10-CM

## 2018-11-28 DIAGNOSIS — Z12.31 VISIT FOR SCREENING MAMMOGRAM: Primary | ICD-10-CM

## 2018-11-28 PROBLEM — R19.7 DIARRHEA: Status: RESOLVED | Noted: 2017-01-16 | Resolved: 2018-11-28

## 2018-11-28 PROCEDURE — 82607 VITAMIN B-12: CPT

## 2018-11-28 PROCEDURE — 36415 COLL VENOUS BLD VENIPUNCTURE: CPT

## 2018-11-28 PROCEDURE — 83516 IMMUNOASSAY NONANTIBODY: CPT

## 2018-11-28 PROCEDURE — 99396 PREV VISIT EST AGE 40-64: CPT | Performed by: INTERNAL MEDICINE

## 2018-11-28 PROCEDURE — 84443 ASSAY THYROID STIM HORMONE: CPT

## 2018-11-28 PROCEDURE — 82306 VITAMIN D 25 HYDROXY: CPT

## 2018-11-28 NOTE — H&P
[address], [facility]    History and Physical    Yareli Cummins Patient Status:  Outpatient    2/15/1960 MRN [id number]   Location [address], [facility] Attending No att. providers found   [facility] Day # 0 PCP Hernandez Montenegro MD Celiac disease   • Cancer Sister         breast  cancer 2015   • Gastro-Intestinal Disorder Brother         Celiac disease   • Hypertension Other         Family H/o   • Thyroid disease Other         Family H/o: Grave's disease [Most of siblings (7)] Left eye exhibits no discharge. No scleral icterus. Neck: Normal range of motion. Neck supple. No JVD present. No thyromegaly present. Cardiovascular: Normal rate, normal heart sounds and intact distal pulses. No murmur heard. Edema not present. Ca CO2 25 02/09/2017    GLU 96 02/09/2017    CA 8.5 02/09/2017    ALB 3.1 (L) 02/09/2017    ALKPHO 54 02/09/2017    BILT 0.4 02/09/2017    TP 6.0 02/09/2017    AST 15 02/09/2017    ALT 15 02/09/2017    TSH 0.86 01/23/2018    LIP 25 11/01/2016    [facility] 1.9 11/ appointment.     Immunizations-    Immunization History   Administered Date(s) Administered   • Hep A, Adult 04/28/2016   • Influenza 11/14/2007, 10/01/2014, 10/01/2015, 10/27/2016, 10/25/2018   • Pneumovax 23 04/28/2016   • TDAP 04/28/2016                O

## 2018-11-28 NOTE — ASSESSMENT & PLAN NOTE
Multiple urinary infections in the past with a few episodes of pyelonephritis. She was seen by infectious disease and has been finally taken off preventive antibiotics  She has been doing much better without any recurrent infections.

## 2018-11-28 NOTE — PATIENT INSTRUCTIONS
Problem List Items Addressed This Visit        Unprioritized    Age-related nuclear cataract of both eyes     Advised to follow-up with Dr. Singleton Payment for an evaluation peer         Relevant Orders    OPHTHALMOLOGY - INTERNAL    Atypical nevi    Relevant Orders [facility] SCREENING BILAT (CPT=77067)

## 2018-12-03 NOTE — PROGRESS NOTES
HPI:    Patient ID: Binu Krishnan is a 62year old female. Physical    G5,p4,1 miscarriage. lmp-6  Yrs back. Mammogram,1 Yr due on 01/05/2013  dexa scan due.   Colonoscopy due on 01/22/2023    Immunization History  Administered            Date(s) Adm Other                       Comment: Family H/o    Thyroid disease                Other                       Comment: Family H/o: Grave's disease (Most of                siblings (6))(    Diabetes                       Other appears well-developed and well-nourished. HENT:   Right Ear: External ear normal.   Left Ear: External ear normal.   Nose: Nose normal.   Mouth/Throat: Oropharynx is clear and moist. No oropharyngeal exudate.    Eyes: Conjunctivae and EOM are normal. Pup exhibits no edema or tenderness. Lymphadenopathy:     She has no cervical adenopathy. Right: No inguinal adenopathy present. Left: No inguinal adenopathy present. Neurological: She is alert and oriented to person, place, and time.  She has Selam–[phone] for an appointment.     Immunizations-    Immunization History   Administered Date(s) Administered   • Hep A, Adult 04/28/2016   • Influenza 11/14/2007, 10/01/2014, 10/01/2015, 10/27/2016, 10/25/2018   • Pneumovax 23 04/28/2016   • TDAP 04

## 2019-01-03 ENCOUNTER — OFFICE VISIT (OUTPATIENT)
Dept: OBGYN CLINIC | Facility: CLINIC | Age: 59
End: 2019-01-03

## 2019-01-03 VITALS
SYSTOLIC BLOOD PRESSURE: 123 MMHG | DIASTOLIC BLOOD PRESSURE: 78 MMHG | WEIGHT: 158 LBS | HEIGHT: 60.7 IN | HEART RATE: 68 BPM | BODY MASS INDEX: 30.22 KG/M2

## 2019-01-03 DIAGNOSIS — Z01.419 ENCOUNTER FOR GYNECOLOGICAL EXAMINATION WITHOUT ABNORMAL FINDING: Primary | ICD-10-CM

## 2019-01-03 DIAGNOSIS — Z12.4 CERVICAL CANCER SCREENING: ICD-10-CM

## 2019-01-03 DIAGNOSIS — N87.1 CIN II (CERVICAL INTRAEPITHELIAL NEOPLASIA II): ICD-10-CM

## 2019-01-03 PROCEDURE — 99396 PREV VISIT EST AGE 40-64: CPT | Performed by: OBSTETRICS & GYNECOLOGY

## 2019-01-03 NOTE — PROGRESS NOTES
[facility] Woman Exam    HPI:  The patient is a 63yo female who presents today for annual exam. She had a LEEP done in 2016 with Dr. Sandi Martinez with negative margins. She then had UTI with ESB and was seeing infectious disease. She has no GYN concerns today.  No ab Transportation needs - medical: Not on file      Transportation needs - non-medical: Not on file    Occupational History      Not on file    Tobacco Use      Smoking status: Never Smoker      Smokeless tobacco: Never Used    Substance and Sexual Activity MEDICATIONS:    Current Outpatient Medications:   •  Cyanocobalamin (VITAMIN B-12 OR), Take by mouth daily. , Disp: , Rfl:   •  Nutritional Supplements (ADULT NUTRITIONAL SUPPLEMENT + OR), Take by mouth.  \"ASHWAGANDHA\" supplement, Disp: , Rfl:   •  C tenderness  Vagina:  Normal appearance without lesions, no abnormal discharge  Cervix:  Normal without tenderness on motion  Uterus: normal in size, contour, position, mobility, without tenderness  Adnexa: normal without masses or tenderness  Perineum: nor

## 2019-01-04 LAB — HPV I/H RISK 1 DNA SPEC QL NAA+PROBE: POSITIVE

## 2019-01-06 LAB
HPV16 DNA CVX QL PROBE+SIG AMP: NEGATIVE
HPV18 DNA CVX QL PROBE+SIG AMP: NEGATIVE

## 2019-01-15 ENCOUNTER — MED REC SCAN ONLY (OUTPATIENT)
Dept: INTERNAL MEDICINE CLINIC | Facility: CLINIC | Age: 59
End: 2019-01-15

## 2019-01-22 ENCOUNTER — TELEPHONE (OUTPATIENT)
Dept: INTERNAL MEDICINE CLINIC | Facility: CLINIC | Age: 59
End: 2019-01-22

## 2019-01-22 ENCOUNTER — OFFICE VISIT (OUTPATIENT)
Dept: OBGYN CLINIC | Facility: CLINIC | Age: 59
End: 2019-01-22

## 2019-01-22 ENCOUNTER — NURSE TRIAGE (OUTPATIENT)
Dept: OTHER | Age: 59
End: 2019-01-22

## 2019-01-22 VITALS
BODY MASS INDEX: 30 KG/M2 | HEART RATE: 79 BPM | SYSTOLIC BLOOD PRESSURE: 127 MMHG | DIASTOLIC BLOOD PRESSURE: 79 MMHG | WEIGHT: 155 LBS

## 2019-01-22 DIAGNOSIS — R87.810 CERVICAL HIGH RISK HUMAN PAPILLOMAVIRUS (HPV) DNA TEST POSITIVE: Primary | ICD-10-CM

## 2019-01-22 PROCEDURE — 57454 BX/CURETT OF CERVIX W/SCOPE: CPT | Performed by: OBSTETRICS & GYNECOLOGY

## 2019-01-22 NOTE — PROCEDURES
Colpo w/Cx Biopsy and ECC    Consent signed. Procedure discussed with patient in detail including indication, risk, benefits, alternatives and complications. Indication: HPV positive     Procedure:   The patient was placed in the dorsal lithotomy positi

## 2019-01-22 NOTE — TELEPHONE ENCOUNTER
PATIENT JUST SAW HER OB/GYNE AND SHE INFORMED THE DOCTOR OF AN UNUSUAL ODOR WHEN SHE URINATES. SHE HAS A HISTORY OF UTI'S AND DR Pamela Allison ADVISED HER TO GET A LAB ORDER FROM DR OCASIO TO TEST THE URINE.

## 2019-01-22 NOTE — TELEPHONE ENCOUNTER
Action Requested: Summary for Provider     []  Critical Lab, Recommendations Needed  [] Need Additional Advice  []   FYI    [x]   Need Orders  [] Need Medications Sent to Pharmacy  []  Other     SUMMARY: Patient calling with complaint of foul smell of urin

## 2019-01-23 ENCOUNTER — HOSPITAL ENCOUNTER (OUTPATIENT)
Dept: MAMMOGRAPHY | Facility: HOSPITAL | Age: 59
Discharge: HOME | End: 2019-01-23
Attending: INTERNAL MEDICINE
Payer: COMMERCIAL

## 2019-01-23 DIAGNOSIS — Z12.31 VISIT FOR SCREENING MAMMOGRAM: ICD-10-CM

## 2019-01-23 PROCEDURE — 77063 BREAST TOMOSYNTHESIS BI: CPT | Performed by: INTERNAL MEDICINE

## 2019-01-23 PROCEDURE — 77067 SCR MAMMO BI INCL CAD: CPT | Performed by: INTERNAL MEDICINE

## 2019-01-23 NOTE — TELEPHONE ENCOUNTER
UA and culture and sensitivity ordered. Patient is currently asymptomatic other than for foul-smelling urine.   She went have the testing done if needed

## 2019-01-24 ENCOUNTER — TELEPHONE (OUTPATIENT)
Dept: OBGYN CLINIC | Facility: CLINIC | Age: 59
End: 2019-01-24

## 2019-01-24 NOTE — TELEPHONE ENCOUNTER
----- Message from Pattie Sheridan MD sent at 1/24/2019 10:47 AM CST -----  Please let patient know that her colpo shows GILBERTO I and we will repeat pap smear in one year.

## 2019-01-28 ENCOUNTER — OFFICE VISIT (OUTPATIENT)
Dept: DERMATOLOGY CLINIC | Facility: CLINIC | Age: 59
End: 2019-01-28

## 2019-01-28 DIAGNOSIS — L81.4 LENTIGO: ICD-10-CM

## 2019-01-28 DIAGNOSIS — D48.5 NEOPLASM OF UNCERTAIN BEHAVIOR OF SKIN: Primary | ICD-10-CM

## 2019-01-28 DIAGNOSIS — L57.8 SUN-DAMAGED SKIN: ICD-10-CM

## 2019-01-28 DIAGNOSIS — D23.60 BENIGN NEOPLASM OF SKIN OF UPPER EXTREMITY AND SHOULDER, UNSPECIFIED LATERALITY: ICD-10-CM

## 2019-01-28 DIAGNOSIS — L82.1 SEBORRHEIC KERATOSES: ICD-10-CM

## 2019-01-28 DIAGNOSIS — D23.30 BENIGN NEOPLASM OF SKIN OF FACE: ICD-10-CM

## 2019-01-28 DIAGNOSIS — D23.5 BENIGN NEOPLASM OF SKIN OF TRUNK, EXCEPT SCROTUM: ICD-10-CM

## 2019-01-28 PROCEDURE — 11103 TANGNTL BX SKIN EA SEP/ADDL: CPT | Performed by: DERMATOLOGY

## 2019-01-28 PROCEDURE — 99213 OFFICE O/P EST LOW 20 MIN: CPT | Performed by: DERMATOLOGY

## 2019-01-28 PROCEDURE — 99212 OFFICE O/P EST SF 10 MIN: CPT | Performed by: DERMATOLOGY

## 2019-01-28 PROCEDURE — 11102 TANGNTL BX SKIN SINGLE LES: CPT | Performed by: DERMATOLOGY

## 2019-01-28 PROCEDURE — 88305 TISSUE EXAM BY PATHOLOGIST: CPT | Performed by: DERMATOLOGY

## 2019-01-28 NOTE — PROGRESS NOTES
Past Medical History:   Diagnosis Date   • Age-related nuclear cataract of both eyes 10/7/2014   • Anemia    • Celiac disease    • Diabetes ([facility] [address]    • E-coli UTI    • Floater, vitreous 10/7/2014   • Headache 10/7/2014   • History of pregnancy 1984, '86, '8 Asked        Exercise: Not Asked        Bike Helmet: Not Asked        Seat Belt: Not Asked        Self-Exams: Not Asked        Grew up on a farm: Not Asked        History of tanning: Not Asked        Outdoor occupation: Not Asked        Pt has a pacemaker:

## 2019-01-28 NOTE — PROGRESS NOTES
HPI:     Chief Complaint     Moles        HPI     Moles      Additional comments: LOV: 3/6/17. Pt presents with mole check x 3 on back, pt states that lesions are large which causes her concern. Pt has a family hx of skin cancer, possibly Melanoma and BCC. eyes 10/7/2014   • Anemia    • Celiac disease    • Diabetes ([facility] [address]    • E-coli UTI    • Floater, vitreous 10/7/2014   • Headache 10/7/2014   • History of pregnancy , , , ,     SAB in , Vaginal forceps in ;  x3   • Lipid screening Belt: Not Asked        Self-Exams: Not Asked        Grew up on a farm: Not Asked        History of tanning: Not Asked        Outdoor occupation: Not Asked        Pt has a pacemaker: No        Pt has a defibrillator: No        Breast feeding: Not Asked of skin  (primary encounter diagnosis)-junction nevus mid lower back x2–in light of the fact the standout from all patient's other lesions I would like to biopsy to rule out atypia–shave biopsies are performed- See the operative note.   Postop instructions

## 2019-01-28 NOTE — PROCEDURES
Procedural Report for Shave Biopsy    Procedure: With the patient is a supine position, the skin was scrubbed with alcohol. Anesthesia was obtained by injecting 1 mL of 1% Xylocaine with Epinephrine.   The skin surrounding the lession was placed under t 16-Jan-2018

## 2019-01-31 NOTE — PROGRESS NOTES
Pt informed of lab results and LSS reccs. Voiced understanding. Logged in lab book and added to pmh.

## 2019-02-04 ENCOUNTER — HOSPITAL ENCOUNTER (OUTPATIENT)
Dept: MAMMOGRAPHY | Facility: HOSPITAL | Age: 59
Discharge: HOME | End: 2019-02-04
Attending: INTERNAL MEDICINE
Payer: COMMERCIAL

## 2019-02-04 ENCOUNTER — HOSPITAL ENCOUNTER (OUTPATIENT)
Dept: ULTRASOUND IMAGING | Facility: HOSPITAL | Age: 59
Discharge: HOME | End: 2019-02-04
Attending: INTERNAL MEDICINE
Payer: COMMERCIAL

## 2019-02-04 DIAGNOSIS — R92.8 ABNORMALITY OF BREAST ON SCREENING MAMMOGRAPHY: ICD-10-CM

## 2019-02-04 PROCEDURE — 77065 DX MAMMO INCL CAD UNI: CPT | Performed by: INTERNAL MEDICINE

## 2019-02-04 PROCEDURE — 76642 ULTRASOUND BREAST LIMITED: CPT | Performed by: INTERNAL MEDICINE

## 2019-02-04 PROCEDURE — 77061 BREAST TOMOSYNTHESIS UNI: CPT | Performed by: INTERNAL MEDICINE

## 2019-03-26 ENCOUNTER — HOSPITAL ENCOUNTER (OUTPATIENT)
Dept: GENERAL RADIOLOGY | Facility: HOSPITAL | Age: 59
Discharge: HOME | End: 2019-03-26
Attending: INTERNAL MEDICINE
Payer: COMMERCIAL

## 2019-03-26 ENCOUNTER — NURSE TRIAGE (OUTPATIENT)
Dept: OTHER | Age: 59
End: 2019-03-26

## 2019-03-26 ENCOUNTER — APPOINTMENT (OUTPATIENT)
Dept: LAB | Facility: HOSPITAL | Age: 59
End: 2019-03-26
Attending: INTERNAL MEDICINE
Payer: COMMERCIAL

## 2019-03-26 ENCOUNTER — OFFICE VISIT (OUTPATIENT)
Dept: INTERNAL MEDICINE CLINIC | Facility: CLINIC | Age: 59
End: 2019-03-26

## 2019-03-26 VITALS
WEIGHT: 158.31 LBS | BODY MASS INDEX: 31.08 KG/M2 | HEIGHT: 60 IN | TEMPERATURE: 99 F | HEART RATE: 96 BPM | DIASTOLIC BLOOD PRESSURE: 75 MMHG | SYSTOLIC BLOOD PRESSURE: 119 MMHG

## 2019-03-26 DIAGNOSIS — R50.9 FEVER, UNSPECIFIED FEVER CAUSE: Primary | ICD-10-CM

## 2019-03-26 DIAGNOSIS — N39.0 RECURRENT UTI: ICD-10-CM

## 2019-03-26 DIAGNOSIS — Z87.448 HISTORY OF PYELONEPHRITIS: ICD-10-CM

## 2019-03-26 DIAGNOSIS — R50.9 FEVER, UNSPECIFIED FEVER CAUSE: ICD-10-CM

## 2019-03-26 LAB
BILIRUB UR QL: NEGATIVE
COLOR UR: YELLOW
FLUAV + FLUBV RNA SPEC NAA+PROBE: NEGATIVE
GLUCOSE UR-MCNC: NEGATIVE MG/DL
NITRITE UR QL STRIP.AUTO: POSITIVE
PH UR: 5 [PH] (ref 5–8)
PROT UR-MCNC: 30 MG/DL
RBC #/AREA URNS AUTO: 3 /HPF
SP GR UR STRIP: 1.02 (ref 1–1.03)
UROBILINOGEN UR STRIP-ACNC: <2
VIT C UR-MCNC: NEGATIVE MG/DL
WBC #/AREA URNS AUTO: 58 /HPF

## 2019-03-26 PROCEDURE — 87186 SC STD MICRODIL/AGAR DIL: CPT

## 2019-03-26 PROCEDURE — 81001 URINALYSIS AUTO W/SCOPE: CPT

## 2019-03-26 PROCEDURE — 87086 URINE CULTURE/COLONY COUNT: CPT

## 2019-03-26 PROCEDURE — 99212 OFFICE O/P EST SF 10 MIN: CPT | Performed by: INTERNAL MEDICINE

## 2019-03-26 PROCEDURE — 87088 URINE BACTERIA CULTURE: CPT

## 2019-03-26 PROCEDURE — 99214 OFFICE O/P EST MOD 30 MIN: CPT | Performed by: INTERNAL MEDICINE

## 2019-03-26 PROCEDURE — 71046 X-RAY EXAM CHEST 2 VIEWS: CPT | Performed by: INTERNAL MEDICINE

## 2019-03-26 RX ORDER — ERGOCALCIFEROL 1.25 MG/1
CAPSULE ORAL
Refills: 1 | COMMUNITY
Start: 2019-03-07 | End: 2019-06-20

## 2019-03-26 RX ORDER — METHENAMINE HIPPURATE 1000 MG/1
1 TABLET ORAL 2 TIMES DAILY
Qty: 10 TABLET | Refills: 0 | Status: SHIPPED | OUTPATIENT
Start: 2019-03-26 | End: 2019-06-04 | Stop reason: ALTCHOICE

## 2019-03-26 RX ORDER — FOSFOMYCIN TROMETHAMINE 3 G/1
3 GRANULE, FOR SOLUTION ORAL ONCE
Qty: 3 G | Refills: 0 | Status: SHIPPED | OUTPATIENT
Start: 2019-03-26 | End: 2019-03-26

## 2019-03-26 NOTE — TELEPHONE ENCOUNTER
Action Requested: Summary for Provider     []  Critical Lab, Recommendations Needed  [] Need Additional Advice  []   FYI    []   Need Orders  [] Need Medications Sent to Pharmacy  []  Other     SUMMARY: Patient calling with unexplained fever of 100-101.0.

## 2019-03-26 NOTE — PROGRESS NOTES
Radha Aggarwal is a 61year old female. Patient presents with:  Fever      HPI:     HPI  Patient is here with complaints of fever that started on Friday suddenly. She was having chills and shivering. On Friday evening she had a fever of 102. F.  Had a feve cataract of both eyes 10/7/2014   • Anemia    • Celiac disease    • Diabetes ([facility] [address]    • Dysplastic nevus 2019    mid lower back   • Dysplastic nevus 2019    mid lower right back   • E-coli UTI    • Floater, vitreous 10/7/2014   • Headache 10/7/2014   • His place, and time. She appears well-developed. No distress. HENT:   Head: Normocephalic and atraumatic. Right Ear: Tympanic membrane normal.   Left Ear: Tympanic membrane normal.   Nose: No mucosal edema or rhinorrhea.  Right sinus exhibits no maxillary s clinical suspicion for meningitis. We will call the patient with the results. .    Addendum on 3/26/2019 at 5:30 PM.  Saw the results of preliminary urine test.  Consistent with UTI. Reviewed previous urine culture results-patient has ESBL E. coli UTI.

## 2019-03-27 ENCOUNTER — HOSPITAL ENCOUNTER (OUTPATIENT)
Dept: ULTRASOUND IMAGING | Facility: HOSPITAL | Age: 59
Discharge: HOME | End: 2019-03-27
Attending: INTERNAL MEDICINE
Payer: COMMERCIAL

## 2019-03-27 ENCOUNTER — LAB ENCOUNTER (OUTPATIENT)
Dept: LAB | Facility: HOSPITAL | Age: 59
End: 2019-03-27
Attending: INTERNAL MEDICINE
Payer: COMMERCIAL

## 2019-03-27 ENCOUNTER — TELEPHONE (OUTPATIENT)
Dept: OTHER | Age: 59
End: 2019-03-27

## 2019-03-27 DIAGNOSIS — Z87.448 HISTORY OF PYELONEPHRITIS: ICD-10-CM

## 2019-03-27 DIAGNOSIS — N12 PYELONEPHRITIS: ICD-10-CM

## 2019-03-27 LAB
ANION GAP SERPL CALC-SCNC: 5 MMOL/L (ref 0–18)
BASOPHILS # BLD AUTO: 0.05 X10(3) UL (ref 0–0.2)
BASOPHILS NFR BLD AUTO: 0.5 %
BUN BLD-MCNC: 13 MG/DL (ref 7–18)
BUN/CREAT SERPL: 15.3 (ref 10–20)
CALCIUM BLD-MCNC: 9.1 MG/DL (ref 8.5–10.1)
CHLORIDE SERPL-SCNC: 106 MMOL/L (ref 98–107)
CO2 SERPL-SCNC: 29 MMOL/L (ref 21–32)
CREAT BLD-MCNC: 0.85 MG/DL (ref 0.55–1.02)
DEPRECATED RDW RBC AUTO: 42.4 FL (ref 35.1–46.3)
EOSINOPHIL # BLD AUTO: 0.08 X10(3) UL (ref 0–0.7)
EOSINOPHIL NFR BLD AUTO: 0.8 %
ERYTHROCYTE [DISTWIDTH] IN BLOOD BY AUTOMATED COUNT: 12.9 % (ref 11–15)
GLUCOSE BLD-MCNC: 94 MG/DL (ref 70–99)
HCT VFR BLD AUTO: 38.2 % (ref 35–48)
HGB BLD-MCNC: 12.5 G/DL (ref 12–16)
IMM GRANULOCYTES # BLD AUTO: 0.06 X10(3) UL (ref 0–1)
IMM GRANULOCYTES NFR BLD: 0.6 %
LYMPHOCYTES # BLD AUTO: 1.97 X10(3) UL (ref 1–4)
LYMPHOCYTES NFR BLD AUTO: 19.2 %
MCH RBC QN AUTO: 29.4 PG (ref 26–34)
MCHC RBC AUTO-ENTMCNC: 32.7 G/DL (ref 31–37)
MCV RBC AUTO: 89.9 FL (ref 80–100)
MONOCYTES # BLD AUTO: 1.31 X10(3) UL (ref 0.1–1)
MONOCYTES NFR BLD AUTO: 12.7 %
NEUTROPHILS # BLD AUTO: 6.81 X10 (3) UL (ref 1.5–7.7)
NEUTROPHILS # BLD AUTO: 6.81 X10(3) UL (ref 1.5–7.7)
NEUTROPHILS NFR BLD AUTO: 66.2 %
OSMOLALITY SERPL CALC.SUM OF ELEC: 290 MOSM/KG (ref 275–295)
PLATELET # BLD AUTO: 377 10(3)UL (ref 150–450)
POTASSIUM SERPL-SCNC: 3.7 MMOL/L (ref 3.5–5.1)
RBC # BLD AUTO: 4.25 X10(6)UL (ref 3.8–5.3)
SODIUM SERPL-SCNC: 140 MMOL/L (ref 136–145)
WBC # BLD AUTO: 10.3 X10(3) UL (ref 4–11)

## 2019-03-27 PROCEDURE — 36415 COLL VENOUS BLD VENIPUNCTURE: CPT

## 2019-03-27 PROCEDURE — 85025 COMPLETE CBC W/AUTO DIFF WBC: CPT

## 2019-03-27 PROCEDURE — 76775 US EXAM ABDO BACK WALL LIM: CPT | Performed by: INTERNAL MEDICINE

## 2019-03-27 PROCEDURE — 80048 BASIC METABOLIC PNL TOTAL CA: CPT

## 2019-03-27 NOTE — TELEPHONE ENCOUNTER
Pt calling to let Dr. Saundra Carlin or Dr. Thalia Larose know she did labs this morning. Pt saw Dr. Saundra Carlin yesterday for UTI, was told to do blood work this morning to make sure infection did not go to the kidneys.     Pt aware Dr. Saundra Carlin is not in the office, and asked if

## 2019-03-28 NOTE — TELEPHONE ENCOUNTER
Patient contacted with test results. She has been informed of normal ultrasound. Urine cultures does show E. coli but culture is pending. Temperatures T-max yesterday 102 and today 100.   She has taken 1 dose of the fosfomycin and is currently on methena

## 2019-04-05 ENCOUNTER — TELEPHONE (OUTPATIENT)
Dept: INTERNAL MEDICINE CLINIC | Facility: CLINIC | Age: 59
End: 2019-04-05

## 2019-04-05 DIAGNOSIS — M21.612 BUNION OF LEFT FOOT: Primary | ICD-10-CM

## 2019-04-05 NOTE — TELEPHONE ENCOUNTER
Patient requesting referral to Dr. Claudia Pal. She has appt on April 22.     Please sign off on this referral.    Thanks,  [place]

## 2019-04-22 ENCOUNTER — OFFICE VISIT (OUTPATIENT)
Dept: PODIATRY CLINIC | Facility: CLINIC | Age: 59
End: 2019-04-22

## 2019-04-22 ENCOUNTER — HOSPITAL ENCOUNTER (OUTPATIENT)
Dept: GENERAL RADIOLOGY | Age: 59
Discharge: HOME | End: 2019-04-22
Attending: PODIATRIST
Payer: COMMERCIAL

## 2019-04-22 DIAGNOSIS — B35.1 ONYCHOMYCOSIS: Primary | ICD-10-CM

## 2019-04-22 DIAGNOSIS — M20.12 HALLUX VALGUS OF LEFT FOOT: ICD-10-CM

## 2019-04-22 DIAGNOSIS — G57.82 NEUROMA OF THIRD INTERSPACE OF LEFT FOOT: ICD-10-CM

## 2019-04-22 PROCEDURE — 64455 NJX AA&/STRD PLTR COM DG NRV: CPT | Performed by: PODIATRIST

## 2019-04-22 PROCEDURE — 73630 X-RAY EXAM OF FOOT: CPT | Performed by: PODIATRIST

## 2019-04-22 PROCEDURE — 99203 OFFICE O/P NEW LOW 30 MIN: CPT | Performed by: PODIATRIST

## 2019-04-22 RX ORDER — TRIAMCINOLONE ACETONIDE 40 MG/ML
20 INJECTION, SUSPENSION INTRA-ARTICULAR; INTRAMUSCULAR ONCE
Status: COMPLETED | OUTPATIENT
Start: 2019-04-22 | End: 2019-04-22

## 2019-04-22 RX ADMIN — TRIAMCINOLONE ACETONIDE 20 MG: 40 INJECTION, SUSPENSION INTRA-ARTICULAR; INTRAMUSCULAR at 17:53:00

## 2019-04-22 NOTE — PROGRESS NOTES
Per Dr Christopher Marker request was draw 1 syringe with 0.5 ml Kenalog 40 mg and 0.5 ml Marcaine 0.5% for this patient left foot.  Alice Beaver

## 2019-04-28 NOTE — PROGRESS NOTES
Francia Loyd is a 61year old female.  Patient presents with:  Bunions: Left big toe - onset years ago but for the past 6-9 mo is worse - she has pain in the ball of her foot and the toes rated as 3-4/10 bu the end of the day         HPI:   This pleasant 10/7/2014   • Recurrent cold sores    • Vertigo       Past Surgical History:   Procedure Laterality Date   • COLONOSCOPY  2/27/2012    per NG   • COLONOSCOPY N/A 1/22/2018    Performed by Fior Carreno MD at [facility] ENDOSCOPY   • COLPOSCOPY, CERVIX W/U rashes  RESPIRATORY: denies shortness of breath with exertion  CARDIOVASCULAR: denies chest pain on exertion  GI: denies abdominal pain and denies heartburn  NEURO: denies headaches    EXAM:   LMP  (LMP Unknown)   Physical Exam  GENERAL: well developed, we be involved. Regarding her neuroma I recommended a nerve block with cortisone. Patient opted for it. The patient was consented for and after timeout was taken a cortisone injection with peripheral nerve block was administered into the third interspace of

## 2019-05-06 ENCOUNTER — OFFICE VISIT (OUTPATIENT)
Dept: PODIATRY CLINIC | Facility: CLINIC | Age: 59
End: 2019-05-06

## 2019-05-06 DIAGNOSIS — G57.82 NEUROMA OF THIRD INTERSPACE OF LEFT FOOT: ICD-10-CM

## 2019-05-06 DIAGNOSIS — M20.12 HALLUX VALGUS OF LEFT FOOT: ICD-10-CM

## 2019-05-06 DIAGNOSIS — B35.1 ONYCHOMYCOSIS: Primary | ICD-10-CM

## 2019-05-06 PROCEDURE — 99213 OFFICE O/P EST LOW 20 MIN: CPT | Performed by: PODIATRIST

## 2019-05-09 NOTE — PROGRESS NOTES
Vik Tolliver is a 61year old female. Patient presents with:   Foot Pain: Left f/u - had cortisone inj on 4/22/19 - states she is better - still has some pain rated as 2/10 on and off         HPI:   Patient returns the clinic today doing very well pain i ADJACENT VAGINA; W/BIOPSY(S), CERVIX     • LEEP     •    , , ,     Vaginal forceps in ;  x3      Family History   Problem Relation Age of Onset   • Cancer Father         Lung cancer   • Hypertension Father    • Lipids Father apparent distress  EXTREMITIES:   1. Integument: The skin was examined we are still waiting for the fungal nail results. 2. Vascular: Patient has palpable pulses on the left foot   3. Neurologic: Patient has intact sensorium   4.  Musculoskeletal: Patient

## 2019-05-29 ENCOUNTER — TELEPHONE (OUTPATIENT)
Dept: INTERNAL MEDICINE CLINIC | Facility: CLINIC | Age: 59
End: 2019-05-29

## 2019-05-29 DIAGNOSIS — E55.9 VITAMIN D DEFICIENCY: Primary | ICD-10-CM

## 2019-05-29 NOTE — TELEPHONE ENCOUNTER
[facility]  [facility] to [facility];  A [email or web address] message was also sent. Per 11/28/18  Lab notes    The vit d levels are low so please take vit d at 50,000 units once a week x 6 months and have labs rechecked after. prescription has been sent to the pharmacy. Thyroid function tests look normal       Vitamin D level was 21.1    Vitamin D ordered.

## 2019-05-30 ENCOUNTER — TELEPHONE (OUTPATIENT)
Dept: ORTHOPEDICS CLINIC | Facility: CLINIC | Age: 59
End: 2019-05-30

## 2019-05-30 DIAGNOSIS — B35.1 ONYCHOMYCOSIS: Primary | ICD-10-CM

## 2019-05-30 NOTE — TELEPHONE ENCOUNTER
----- Message from True Lesch, DPM sent at 5/7/2019  3:35 PM CDT -----  Results reviewed. Please inform patient that fungal culture results are positive and we should proceed with Lamisil tablet therapy.   If the patient agrees we have to do a hepat

## 2019-06-04 ENCOUNTER — OFFICE VISIT (OUTPATIENT)
Dept: INTERNAL MEDICINE CLINIC | Facility: CLINIC | Age: 59
End: 2019-06-04

## 2019-06-04 ENCOUNTER — TELEPHONE (OUTPATIENT)
Dept: OTHER | Age: 59
End: 2019-06-04

## 2019-06-04 ENCOUNTER — APPOINTMENT (OUTPATIENT)
Dept: LAB | Facility: HOSPITAL | Age: 59
End: 2019-06-04
Attending: INTERNAL MEDICINE
Payer: COMMERCIAL

## 2019-06-04 VITALS
HEIGHT: 60 IN | WEIGHT: 160 LBS | BODY MASS INDEX: 31.41 KG/M2 | DIASTOLIC BLOOD PRESSURE: 77 MMHG | HEART RATE: 63 BPM | SYSTOLIC BLOOD PRESSURE: 132 MMHG

## 2019-06-04 DIAGNOSIS — B35.1 ONYCHOMYCOSIS: ICD-10-CM

## 2019-06-04 DIAGNOSIS — E55.9 VITAMIN D DEFICIENCY: ICD-10-CM

## 2019-06-04 DIAGNOSIS — R42 VERTIGO: Primary | ICD-10-CM

## 2019-06-04 PROCEDURE — 99213 OFFICE O/P EST LOW 20 MIN: CPT | Performed by: INTERNAL MEDICINE

## 2019-06-04 PROCEDURE — 36415 COLL VENOUS BLD VENIPUNCTURE: CPT

## 2019-06-04 PROCEDURE — 99212 OFFICE O/P EST SF 10 MIN: CPT | Performed by: INTERNAL MEDICINE

## 2019-06-04 PROCEDURE — 80076 HEPATIC FUNCTION PANEL: CPT

## 2019-06-04 PROCEDURE — 82306 VITAMIN D 25 HYDROXY: CPT

## 2019-06-04 NOTE — PROGRESS NOTES
Isa Braga is a 61year old female. Patient presents with:  Dizziness    HPI:   In the middle of the night last Monday, 8 days ago, she awoke from sleep with vertigo at rest, describing a feeling as if the \"bed were spinning. \"  Symptoms have improve COLONOSCOPY N/A 2018    Performed by Ti Mckeon MD at [address] [place] [address] ENDOSCOPY   • COLPOSCOPY, CERVIX W/UPPER ADJACENT VAGINA; W/BIOPSY(S), CERVIX     • LEEP     •    , , ,     Vaginal forceps in ;  x3      Social History:  Soc

## 2019-06-04 NOTE — TELEPHONE ENCOUNTER
Action Requested: Summary for Provider     []  Critical Lab, Recommendations Needed  [] Need Additional Advice  []   FYI    []   Need Orders  [] Need Medications Sent to Pharmacy  []  Other     SUMMARY: OV scheduled today with Dr Vannessa Levy for ongoing dizzines

## 2019-06-06 RX ORDER — TERBINAFINE HYDROCHLORIDE 250 MG/1
250 TABLET ORAL DAILY
Qty: 45 TABLET | Refills: 0 | Status: SHIPPED | OUTPATIENT
Start: 2019-06-06 | End: 2019-12-03

## 2019-06-21 RX ORDER — ERGOCALCIFEROL 1.25 MG/1
CAPSULE ORAL
Qty: 12 CAPSULE | Refills: 1 | Status: SHIPPED | OUTPATIENT
Start: 2019-06-21 | End: 2022-01-14

## 2019-06-21 NOTE — TELEPHONE ENCOUNTER
Review pended refill request as it does not fall under a protocol.     Requested Prescriptions     Pending Prescriptions Disp Refills   • ergocalciferol 01807 units Oral Cap 12 capsule 1     Sig: TK 1 C PO 1 TIME A WK         Recent Visits  Date Type Provid

## 2019-08-24 ENCOUNTER — NURSE TRIAGE (OUTPATIENT)
Dept: INTERNAL MEDICINE CLINIC | Facility: CLINIC | Age: 59
End: 2019-08-24

## 2019-08-24 NOTE — TELEPHONE ENCOUNTER
Action Requested: Summary for Provider     []  Critical Lab, Recommendations Needed  [] Need Additional Advice  []   FYI    []   Need Orders  [] Need Medications Sent to Pharmacy  []  Other     SUMMARY: Spoke with the patient who reports she fell while at Bronchodilators neb  Oxygen supp

## 2019-08-26 ENCOUNTER — OFFICE VISIT (OUTPATIENT)
Dept: INTERNAL MEDICINE CLINIC | Facility: CLINIC | Age: 59
End: 2019-08-26

## 2019-08-26 VITALS
HEART RATE: 80 BPM | HEIGHT: 60 IN | DIASTOLIC BLOOD PRESSURE: 70 MMHG | BODY MASS INDEX: 32.79 KG/M2 | SYSTOLIC BLOOD PRESSURE: 105 MMHG | WEIGHT: 167 LBS

## 2019-08-26 DIAGNOSIS — S09.90XA INJURY OF HEAD, INITIAL ENCOUNTER: Primary | ICD-10-CM

## 2019-08-26 PROCEDURE — 99213 OFFICE O/P EST LOW 20 MIN: CPT | Performed by: NURSE PRACTITIONER

## 2019-08-26 NOTE — PATIENT INSTRUCTIONS
After a Concussion     Awaken to check alertness as often as the health care provider suggests. If you had a mild concussion (a head injury), watch closely for signs of problems during the first 48 hours after the injury.  Follow the doctor’s advice a · Confusion or memory loss  · Blurred vision or any vision changes  · Inability to walk or talk normally  · Increased weakness or problems with coordination  · Constant, unrelieved headache that becomes more severe  · Changes in behavior or personality  ·

## 2019-08-26 NOTE — PROGRESS NOTES
HPI:    Patient ID: Ada Tran is a 61year old female. 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 She had no LOC. The  saw her fall. This happened a week and a half ago.  She fell completely back and her Disp:  Rfl:    CRANBERRY OR Take by mouth. Disp:  Rfl:    Ascorbic Acid (VITAMIN C) 100 MG Oral Tab Take 100 mg by mouth daily. Disp:  Rfl:    Probiotic Product (PROBIOTIC DAILY OR) Take by mouth.  Disp:  Rfl:      Allergies:  Gluten Flour              Seas VIEWS) (CPT=72040)           No follow-ups on file. No orders of the defined types were placed in this encounter.       Meds This Visit:  Requested Prescriptions      No prescriptions requested or ordered in this encounter       Imaging & Referrals:  CT

## 2019-08-27 NOTE — ASSESSMENT & PLAN NOTE
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 She fell off the stool and the first thing to hit the ground was her head. I palpate a lump in the occipital part of her head. She denies LOC, vomiting or nausea.  It has been over a

## 2019-08-30 ENCOUNTER — HOSPITAL ENCOUNTER (OUTPATIENT)
Dept: CT IMAGING | Facility: HOSPITAL | Age: 59
Discharge: HOME | End: 2019-08-30
Attending: NURSE PRACTITIONER
Payer: COMMERCIAL

## 2019-08-30 ENCOUNTER — TELEPHONE (OUTPATIENT)
Dept: INTERNAL MEDICINE CLINIC | Facility: CLINIC | Age: 59
End: 2019-08-30

## 2019-08-30 ENCOUNTER — HOSPITAL ENCOUNTER (OUTPATIENT)
Dept: GENERAL RADIOLOGY | Facility: HOSPITAL | Age: 59
Discharge: HOME | End: 2019-08-30
Attending: NURSE PRACTITIONER
Payer: COMMERCIAL

## 2019-08-30 DIAGNOSIS — S09.90XA INJURY OF HEAD, INITIAL ENCOUNTER: ICD-10-CM

## 2019-08-30 LAB — CREAT BLD-MCNC: 1 MG/DL (ref 0.55–1.02)

## 2019-08-30 PROCEDURE — 82565 ASSAY OF CREATININE: CPT

## 2019-08-30 PROCEDURE — 70470 CT HEAD/BRAIN W/O & W/DYE: CPT | Performed by: NURSE PRACTITIONER

## 2019-08-30 PROCEDURE — 72050 X-RAY EXAM NECK SPINE 4/5VWS: CPT | Performed by: NURSE PRACTITIONER

## 2019-08-30 NOTE — TELEPHONE ENCOUNTER
Patient coming in today for CT of the brain, current CT order is w+wo contrast, wanting to confirm order, use of contrast usually used to identify mass, pt has Dx head injury, test is usually done without contrast to identify bleed, please advise on new or

## 2019-10-23 ENCOUNTER — NURSE TRIAGE (OUTPATIENT)
Dept: INTERNAL MEDICINE CLINIC | Facility: CLINIC | Age: 59
End: 2019-10-23

## 2019-10-23 DIAGNOSIS — R42 VERTIGO: Primary | ICD-10-CM

## 2019-10-23 NOTE — TELEPHONE ENCOUNTER
Action Requested: Summary for Provider     []  Critical Lab, Recommendations Needed  [] Need Additional Advice  []   FYI    []   Need Orders  [] Need Medications Sent to Pharmacy  []  Other     SUMMARY: Dr. Po Westfall: please review.     Reason for call: Afua Castillo

## 2019-12-03 ENCOUNTER — OFFICE VISIT (OUTPATIENT)
Dept: INTERNAL MEDICINE CLINIC | Facility: CLINIC | Age: 59
End: 2019-12-03

## 2019-12-03 ENCOUNTER — PATIENT MESSAGE (OUTPATIENT)
Dept: INTERNAL MEDICINE CLINIC | Facility: CLINIC | Age: 59
End: 2019-12-03

## 2019-12-03 VITALS
HEIGHT: 60 IN | SYSTOLIC BLOOD PRESSURE: 140 MMHG | WEIGHT: 168 LBS | DIASTOLIC BLOOD PRESSURE: 76 MMHG | BODY MASS INDEX: 32.98 KG/M2 | HEART RATE: 78 BPM | RESPIRATION RATE: 16 BRPM

## 2019-12-03 DIAGNOSIS — F07.81 POST CONCUSSION SYNDROME: ICD-10-CM

## 2019-12-03 DIAGNOSIS — D22.9 ATYPICAL NEVI: ICD-10-CM

## 2019-12-03 DIAGNOSIS — K90.0 CELIAC DISEASE: ICD-10-CM

## 2019-12-03 DIAGNOSIS — M21.612 BUNION OF GREAT TOE OF LEFT FOOT: ICD-10-CM

## 2019-12-03 DIAGNOSIS — Z12.31 VISIT FOR SCREENING MAMMOGRAM: ICD-10-CM

## 2019-12-03 DIAGNOSIS — Z78.0 MENOPAUSE: ICD-10-CM

## 2019-12-03 DIAGNOSIS — Z00.00 ROUTINE PHYSICAL EXAMINATION: Primary | ICD-10-CM

## 2019-12-03 PROBLEM — N39.0 RECURRENT UTI: Status: RESOLVED | Noted: 2017-01-16 | Resolved: 2019-12-03

## 2019-12-03 PROCEDURE — 99396 PREV VISIT EST AGE 40-64: CPT | Performed by: INTERNAL MEDICINE

## 2019-12-03 NOTE — ASSESSMENT & PLAN NOTE
Normal exam.  Labs as ordered. Skin check normal.  Multiple scattered nevi present. Recommend yearly skin check–Dr. Phipps Ubiregi, [de-identified] for an appointment.   Breast exam completed–no palpable abnormalities, discharge from the nipples or axillary adenopathy

## 2019-12-03 NOTE — PROGRESS NOTES
REASON FOR VISIT:    Carlos Swain is a 61year old female who presents for an [address]     Immunization History   Administered Date(s) Administered   • Hep A, Adult 04/28/2016   • Influenza 11/14/2007, 10/01/2014, 10/01/2015, 10/27/2016, years Colonoscopy due on 01/22/2023    Flex Sigmoidoscopy Screen  Every 5 years No results found for this or any previous visit.     Fecal Occult Blood  Annually No results found for: FOB, OCCULTSTOOL    Obesity Screening Screen all adults annually Body mas LDL  Annually LDL Cholesterol (mg/dL)   Date Value   12/07/2019 121 (H)    No flowsheet data found. Dilated Eye exam  Annually No flowsheet data found. No flowsheet data found.     Asthma  (Annually between Nov. 1 & Dec. 31)    Date of last AAP/ACT and Vaginal forceps in ;  x3      Family History   Problem Relation Age of Onset   • Cancer Father         Lung cancer   • Hypertension Father    • Lipids Father         Hyperlipidemia   • Heart Surgery Father         CABG   • Gastro-Intestinal Disorde suspicious lesions  HEENT: atraumatic, normocephalic, ears and throat are clear  EYES:PERRLA, EOMI, normal optic disk, conjunctiva are clear  NECK: supple, no adenopathy, no bruits  CHEST: no chest tenderness  BREAST: no dominant or suspicious mass  LUNGS: tinnitus, focus difficulty, some photophobia and phonophobia. Has also noted difficulty in follow-up so feels that she has slowed down quite a bit.   She is being referred to neurology for an evaluation at this time–Dr. Shravan Solomon, [phone] for an appointme DIFFERENTIAL WITH PLATELET; Future  -     COMP METABOLIC PANEL (14); Future  -     LIPID PANEL;  Future  -     ASSAY, THYROID STIM HORMONE; Future  -     URINALYSIS, ROUTINE; Future    Celiac disease  -     VITAMIN B12; Future  -     VITAMIN D, 25-HYDROXY;

## 2019-12-03 NOTE — ASSESSMENT & PLAN NOTE
History of a fall back and hitting the head a few months back. Was seen in the ER. CT brain did not show any bleeding. Patient has had some tinnitus, focus difficulty, some photophobia and phonophobia.   Has also noted difficulty in follow-up so feels th

## 2019-12-03 NOTE — ASSESSMENT & PLAN NOTE
Patient has been on strict dietary restriction. Colonoscopy is up-to-date. She has not had any recurrent abdominal pain or discomfort.   Colonoscopy due on 01/22/2023

## 2019-12-04 NOTE — TELEPHONE ENCOUNTER
From: Vik Tolliver  To: Nicolle Sharma MD  Sent: 12/3/2019 5:58 PM CST  Subject: Visit Follow-up Question    Here are the test results

## 2019-12-07 ENCOUNTER — LAB ENCOUNTER (OUTPATIENT)
Dept: LAB | Facility: HOSPITAL | Age: 59
End: 2019-12-07
Attending: INTERNAL MEDICINE
Payer: COMMERCIAL

## 2019-12-07 DIAGNOSIS — K90.0 CELIAC DISEASE: ICD-10-CM

## 2019-12-07 DIAGNOSIS — Z00.00 ROUTINE PHYSICAL EXAMINATION: ICD-10-CM

## 2019-12-07 PROCEDURE — 80061 LIPID PANEL: CPT

## 2019-12-07 PROCEDURE — 80053 COMPREHEN METABOLIC PANEL: CPT

## 2019-12-07 PROCEDURE — 84443 ASSAY THYROID STIM HORMONE: CPT

## 2019-12-07 PROCEDURE — 81001 URINALYSIS AUTO W/SCOPE: CPT

## 2019-12-07 PROCEDURE — 82607 VITAMIN B-12: CPT

## 2019-12-07 PROCEDURE — 85025 COMPLETE CBC W/AUTO DIFF WBC: CPT

## 2019-12-07 PROCEDURE — 82306 VITAMIN D 25 HYDROXY: CPT

## 2019-12-07 PROCEDURE — 36415 COLL VENOUS BLD VENIPUNCTURE: CPT

## 2019-12-17 ENCOUNTER — TELEPHONE (OUTPATIENT)
Dept: INTERNAL MEDICINE CLINIC | Facility: CLINIC | Age: 59
End: 2019-12-17

## 2019-12-17 NOTE — TELEPHONE ENCOUNTER
Patient stated she had a concussion the end of august. Dr. Ning Kerr referred her to Dr. Susie Vanegas. Unfortunately Dr. Susie Vanegas next appointment is not until March.  Patient set the appointment for March but would like to know what she can do in the meantime Chitra

## 2019-12-17 NOTE — TELEPHONE ENCOUNTER
Patient is currently on wait list for appt with Dr Mery Baker has appt for 3/9, for post concussion follow up. Was advised can request a sooner appt with other available provider.  Patient requesting treatment recommendations in the meantime, has been feeling

## 2019-12-21 ENCOUNTER — OFFICE VISIT (OUTPATIENT)
Dept: INTERNAL MEDICINE CLINIC | Facility: CLINIC | Age: 59
End: 2019-12-21

## 2019-12-21 VITALS
TEMPERATURE: 98 F | SYSTOLIC BLOOD PRESSURE: 123 MMHG | HEART RATE: 73 BPM | BODY MASS INDEX: 33 KG/M2 | OXYGEN SATURATION: 96 % | WEIGHT: 170 LBS | DIASTOLIC BLOOD PRESSURE: 69 MMHG

## 2019-12-21 DIAGNOSIS — R41.840 INATTENTION: Primary | ICD-10-CM

## 2019-12-21 NOTE — TELEPHONE ENCOUNTER
Pt called, stated she saw Dr Aldo Carranza but thought he was a neuro Dr, stated she just cried, very emotional, can't focus, just need something for anxiety-have taken clonazepam , will be going to [address] [place] [address] Rd for a week, advised to call neuro department to see any of D

## 2020-01-15 ENCOUNTER — OFFICE VISIT (OUTPATIENT)
Dept: PODIATRY CLINIC | Facility: CLINIC | Age: 60
End: 2020-01-15

## 2020-01-15 ENCOUNTER — TELEPHONE (OUTPATIENT)
Dept: PODIATRY CLINIC | Facility: CLINIC | Age: 60
End: 2020-01-15

## 2020-01-15 DIAGNOSIS — M20.12 HALLUX VALGUS OF LEFT FOOT: Primary | ICD-10-CM

## 2020-01-15 DIAGNOSIS — M79.672 LEFT FOOT PAIN: ICD-10-CM

## 2020-01-15 PROCEDURE — 99213 OFFICE O/P EST LOW 20 MIN: CPT | Performed by: PODIATRIST

## 2020-01-15 NOTE — PROGRESS NOTES
Johanna Bahena is a 61year old female. Patient presents with:  Bunions: Left foot f/u - she is here to talk about sx         HPI:   Today patient returns to the clinic she wants to discuss surgery for her left foot bunion.   At today's visit reviewed nurs    , , ,     Vaginal forceps in ;  x3      Family History   Problem Relation Age of Onset   • Cancer Father         Lung cancer   • Hypertension Father    • Lipids Father         Hyperlipidemia   • Heart Surgery Father         CABG and dry. She has an enlarged medial eminence of the first metatarsal head characteristic of a bunion deformity. 2. Vascular: Patient has palpable pulses   3. Neurologic: Patient has intact sensorium   4.  Musculoskeletal: Patient has lateral deviation of JULIA

## 2020-01-15 NOTE — TELEPHONE ENCOUNTER
Procedure: Z bunionectomy with internal bone screw fixation, possible phalangeal osteotomy with internal bone screw fixation, left foot  CPT code: 31995, 33900  Length of Surgery: 1.5 hours  Any Instruments: Mini power, mini fluoroscopy, met Mark Anthony bone scr

## 2020-01-16 ENCOUNTER — TELEPHONE (OUTPATIENT)
Dept: PODIATRY CLINIC | Facility: CLINIC | Age: 60
End: 2020-01-16

## 2020-01-16 DIAGNOSIS — M20.12 ACQUIRED HALLUX VALGUS OF LEFT FOOT: Primary | ICD-10-CM

## 2020-01-16 DIAGNOSIS — M79.672 LEFT FOOT PAIN: ICD-10-CM

## 2020-01-30 NOTE — TELEPHONE ENCOUNTER
Spoke with patient who requests surgery to be scheduled for 3/18/20. Surgery scheduled for that date at [facility] for Z bunionectomy with internal bone screw fixation and possible phalangeal osteotomy with internal bone screw fixation, left foot.   Pre and Post

## 2020-02-05 ENCOUNTER — OFFICE VISIT (OUTPATIENT)
Dept: DERMATOLOGY CLINIC | Facility: CLINIC | Age: 60
End: 2020-02-05

## 2020-02-05 DIAGNOSIS — D48.5 NEOPLASM OF UNCERTAIN BEHAVIOR OF SKIN: Primary | ICD-10-CM

## 2020-02-05 PROCEDURE — 88305 TISSUE EXAM BY PATHOLOGIST: CPT | Performed by: DERMATOLOGY

## 2020-02-05 PROCEDURE — 99213 OFFICE O/P EST LOW 20 MIN: CPT | Performed by: DERMATOLOGY

## 2020-02-05 PROCEDURE — 11102 TANGNTL BX SKIN SINGLE LES: CPT | Performed by: DERMATOLOGY

## 2020-02-08 NOTE — PROGRESS NOTES
The pathology report from last visit showed  left alar crease -Intradermal nevus . Please log in test results, send biopsy results letter. Pt to rtc 6-12 mos or prn.

## 2020-02-17 NOTE — PROGRESS NOTES
Operative Report                     Shave/  Tangential biopsy     Clinical diagnosis:    Size of lesion:    Location:Diagnosis/Clinical History: pt with lesion uncertain duration  Spec 1 Description >>>>>: left alar crease  Spec 1 Comment: r/o BCC 4

## 2020-02-17 NOTE — PROGRESS NOTES
Samy Solorio is a 61year old female. HPI:     CC:  Patient presents with:  Full Skin Exam: LOV 1/2019 with LSS. [place] concerned with large moles throughout body. Father has hx of melanoma, sister has hx of skin cancer.  Denies personal hx of skin c degeneration Neg       Social History    Tobacco Use      Smoking status: Never Smoker      Smokeless tobacco: Never Used    Alcohol use:  Yes      Alcohol/week: 0.0 standard drinks      Comment: 1 glass of wine weekly    Drug use: No       Current Outpatie History      Marital status:       Spouse name: Not on file      Number of children: Not on file      Years of education: Not on file      Highest education level: Not on file    Occupational History      Not on file    Social Needs      Financial Asked        Outdoor occupation: Not Asked        Pt has a pacemaker: No        Pt has a defibrillator: No        Breast feeding: Not Asked        Reaction to local anesthetic: No    Social History Narrative      Not on file    Family History   Problem Rel section. Patient has been in their usual state of health. History, medications, allergies reviewed as noted. ROS:  Denies any other systemic complaints. No new or changeing lesions other than noted above.  No fevers, chills, night sweats, unusua otherwise. Darker macule at right posterior thigh, plantar left foot observe. History of atypical lesion biopsied previously no recurrence.   Including mid lower back, right lower back no recurrence of lesions that appear excised previously 1/2019    Ex

## 2020-02-27 ENCOUNTER — TELEPHONE (OUTPATIENT)
Dept: OTHER | Age: 60
End: 2020-02-27

## 2020-02-27 RX ORDER — CLONAZEPAM 0.5 MG/1
0.5 TABLET ORAL NIGHTLY PRN
Qty: 30 TABLET | Refills: 0 | Status: SHIPPED | OUTPATIENT
Start: 2020-02-27 | End: 2021-01-19 | Stop reason: ALTCHOICE

## 2020-02-27 NOTE — TELEPHONE ENCOUNTER
Patient calling reports has had headaches since an accident, post concussion     Has been taking the anit-  anxiety medication and headaches have improved, sleeping better;  she has been taking 1/2 tablet and tolerates that well .  States' Anxiety has dimin

## 2020-03-01 NOTE — TELEPHONE ENCOUNTER
Call patient and she still needs to keep her appt.      Dr. Broderick Standing, appt is 3/9/2020     Thanks,  Tray Sender, ANP

## 2020-03-02 NOTE — TELEPHONE ENCOUNTER
Yes I would recommend that just to make sure that she does not need any further w/u for concussion.     Ok to continue the clonazepam as 1/2 a tab daily and we will discus tapering off ant the next ov

## 2020-03-02 NOTE — TELEPHONE ENCOUNTER
Advised patient of Jenn Landin's note below. Patient states that she is down to 1/2 a tablet of clonazepam and feels 100% better, having no symptoms. Patient asking if Dr. Lit York thinks it is necessary to keep appointment with neurologist. Please advise.

## 2020-03-03 NOTE — TELEPHONE ENCOUNTER
Spoke with the patient and instructed her on Dr. Sarah Perry message and plan of care. Patient voiced understanding and agreed with the plan of care.

## 2020-03-09 ENCOUNTER — OFFICE VISIT (OUTPATIENT)
Dept: NEUROLOGY | Facility: CLINIC | Age: 60
End: 2020-03-09

## 2020-03-09 VITALS
HEART RATE: 66 BPM | DIASTOLIC BLOOD PRESSURE: 80 MMHG | SYSTOLIC BLOOD PRESSURE: 130 MMHG | BODY MASS INDEX: 31.34 KG/M2 | HEIGHT: 61 IN | WEIGHT: 166 LBS

## 2020-03-09 DIAGNOSIS — S06.0X1A CONCUSSION WITH LOSS OF CONSCIOUSNESS OF 30 MINUTES OR LESS, INITIAL ENCOUNTER: Primary | ICD-10-CM

## 2020-03-09 PROCEDURE — 99244 OFF/OP CNSLTJ NEW/EST MOD 40: CPT | Performed by: OTHER

## 2020-03-09 NOTE — PROGRESS NOTES
Neurology Outpatient Consult Note    Isa Braga : 2/15/1960   Referring Physician: Dr. Wilton Goetz  HPI:     Isa Braga is a 61year old female who is being seen in neurologic evaluation. Patient being seen in evaluation for concussion.   Maurice Santana 3/9/2020 ) 12 capsule 1   • Cholecalciferol 5000 units Oral Tab Take 1 tablet by mouth daily.         Past Medical History:   Diagnosis Date   • Age-related nuclear cataract of both eyes 10/7/2014   • Anemia    • Celiac disease    • Diabetes ([facility] [address]    • Dysp education level: Not on file    Tobacco Use      Smoking status: Never Smoker      Smokeless tobacco: Never Used    Substance and Sexual Activity      Alcohol use:  Yes        Alcohol/week: 0.0 standard drinks        Comment: 1 glass of wine weekly      [facility] cervical spine August 2019  CONCLUSION:   1. No radiographically visible acute osseous injury of the cervical spine. 2. Mild degenerative changes of the cervical spine are most pronounced at C5-C6 and C6-C7.       Blood work from December 2019 reviewed

## 2020-03-17 NOTE — TELEPHONE ENCOUNTER
Due to COV19, all elective surgeries are being cancelled.   Patient was informed this date and [facility] was notified through [facility]

## 2020-04-06 ENCOUNTER — NURSE TRIAGE (OUTPATIENT)
Dept: INTERNAL MEDICINE CLINIC | Facility: CLINIC | Age: 60
End: 2020-04-06

## 2020-04-06 DIAGNOSIS — B02.9 HERPES ZOSTER WITHOUT COMPLICATION: Primary | ICD-10-CM

## 2020-04-06 PROCEDURE — 99213 OFFICE O/P EST LOW 20 MIN: CPT | Performed by: INTERNAL MEDICINE

## 2020-04-06 RX ORDER — VALACYCLOVIR HYDROCHLORIDE 1 G/1
TABLET, FILM COATED ORAL
Qty: 20 TABLET | Refills: 0 | Status: SHIPPED | OUTPATIENT
Start: 2020-04-06 | End: 2021-01-24

## 2020-04-06 RX ORDER — GABAPENTIN 100 MG/1
100 CAPSULE ORAL 2 TIMES DAILY
Qty: 60 CAPSULE | Refills: 0 | Status: SHIPPED | OUTPATIENT
Start: 2020-04-06 | End: 2021-01-19 | Stop reason: ALTCHOICE

## 2020-04-06 RX ORDER — PREDNISONE 1 MG/1
TABLET ORAL
Qty: 15 TABLET | Refills: 0 | Status: SHIPPED | OUTPATIENT
Start: 2020-04-06 | End: 2021-01-19 | Stop reason: ALTCHOICE

## 2020-04-06 NOTE — ASSESSMENT & PLAN NOTE
2-day history of tingling, numbness on the right side of the face, extending below the eye. Slight pain off and on, soreness around the eyes. Woke up this morning with some redness on the outer aspect of the right eye. No tearing.   No blisters in the fa

## 2020-04-06 NOTE — TELEPHONE ENCOUNTER
Action Requested: Summary for Provider     []  Critical Lab, Recommendations Needed  [] Need Additional Advice  []   FYI    []   Need Orders  [] Need Medications Sent to Pharmacy  []  Other     SUMMARY: onset yesterday, swelling/redness R eyelids, sclera r

## 2020-04-06 NOTE — TELEPHONE ENCOUNTER
Virtual/Telephone Check-In    Yareli Cummins verbally consents to a Virtual/Telephone Check-In service on 04/06/20. Patient understands and accepts financial responsibility for any deductible, co-insurance and/or co-pays associated with this service.

## 2020-04-07 NOTE — TELEPHONE ENCOUNTER
Dr Ann Pressgordon, please advise. Patient called in to report as you instructed. She said it's been a good day. There is new blotchiness on forehead and right side of face.  The top corner of her right eye hurts, is sore, but the vision is fine, and the eyeball

## 2020-05-19 ENCOUNTER — TELEPHONE (OUTPATIENT)
Dept: PODIATRY CLINIC | Facility: CLINIC | Age: 60
End: 2020-05-19

## 2020-05-19 NOTE — TELEPHONE ENCOUNTER
Called patient this date to inform her that we are now able to schedule elective procedures. [place] will be a problem for recovery.   States she wants to have this done but will likely have to wait until Spring

## 2020-07-14 ENCOUNTER — TELEPHONE (OUTPATIENT)
Dept: INTERNAL MEDICINE CLINIC | Facility: CLINIC | Age: 60
End: 2020-07-14

## 2020-07-14 DIAGNOSIS — R11.2 NAUSEA AND VOMITING, INTRACTABILITY OF VOMITING NOT SPECIFIED, UNSPECIFIED VOMITING TYPE: Primary | ICD-10-CM

## 2020-07-14 NOTE — TELEPHONE ENCOUNTER
Patient requesting a letter for her employer. 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  Please advise

## 2020-07-22 NOTE — TELEPHONE ENCOUNTER
None of her present medical conditions are serious enough to give her a note stating that she cannot be in a classroom.

## 2020-07-22 NOTE — TELEPHONE ENCOUNTER
Pt informed of provider's response below. Pt upset about response and insisting on scheduling virtual visit with a different provider.  Repeatedly informed pt that Dr Ej Birch is the one that knows her medical history best and another provider will not over-r

## 2020-07-23 PROBLEM — F41.9 ANXIETY: Status: ACTIVE | Noted: 2020-07-23

## 2020-07-23 NOTE — ASSESSMENT & PLAN NOTE
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 She is crying on the phone. Plan   1) Behavior health Referral  2) Behavior Health navigator.     Patient is in agreement with this plan

## 2020-07-23 NOTE — PROGRESS NOTES
Right HPI:    Patient ID: Irene Gil is a 61year old female. Please note that the following visit was completed using two-way, real-time interactive audio and/or video communication.   This has been done in good eduar to provide continuity of care in Social History    Socioeconomic History      Marital status:       Spouse name: Not on file      Number of children: Not on file      Years of education: Not on file      Highest education level: Not on file    Tobacco Use      Smoking status: Ne WK (Patient not taking: Reported on 3/9/2020 ) 12 capsule 1   • Cholecalciferol 5000 units Oral Tab Take 1 tablet by mouth daily. • Cyanocobalamin (VITAMIN B-12 OR) Take by mouth daily.      • Nutritional Supplements (ADULT NUTRITIONAL SUPPLEMENT + OR)

## 2020-08-10 ENCOUNTER — NURSE TRIAGE (OUTPATIENT)
Dept: INTERNAL MEDICINE CLINIC | Facility: CLINIC | Age: 60
End: 2020-08-10

## 2020-08-10 NOTE — TELEPHONE ENCOUNTER
Agree with ER evaluation–not urgent care. May need x-rays which are not available in urgent care. Urgent care evaluations are not covered for HMO insurances.

## 2020-08-10 NOTE — TELEPHONE ENCOUNTER
Please reply to pool: EM RN TRIAGE    Action Requested: Summary for Provider     []  Critical Lab, Recommendations Needed  [] Need Additional Advice  [x]   FYI    []   Need Orders  [] Need Medications Sent to Pharmacy  []  Other     SUMMARY: Patient pres

## 2020-08-10 NOTE — TELEPHONE ENCOUNTER
Advised patient of Dr. Carl Brooks note. Patient  Indicated that rock did not fall on the foot. Rock sliced the top of her right big toe. Patient has been using the antibiotic ointment and has a bandaid on the area to keep area closed together.  Patient Malaika Fill

## 2020-10-16 ENCOUNTER — TELEPHONE (OUTPATIENT)
Dept: PODIATRY CLINIC | Facility: CLINIC | Age: 60
End: 2020-10-16

## 2020-10-19 NOTE — TELEPHONE ENCOUNTER
Called patient this date and rescheduled her surgery to 11/4/20. Her referral is still valid and will refax order for walking boot to [facility] this date.   Reviewed pre and post op infections again this date including need for COVID test and patien

## 2020-11-01 ENCOUNTER — LAB REQUISITION (OUTPATIENT)
Dept: LAB | Facility: HOSPITAL | Age: 60
End: 2020-11-01
Payer: COMMERCIAL

## 2020-11-01 DIAGNOSIS — Z01.818 ENCOUNTER FOR OTHER PREPROCEDURAL EXAMINATION: ICD-10-CM

## 2020-11-05 ENCOUNTER — TELEPHONE (OUTPATIENT)
Dept: ORTHOPEDICS CLINIC | Facility: CLINIC | Age: 60
End: 2020-11-05

## 2020-11-05 RX ORDER — ONDANSETRON 4 MG/1
4 TABLET, ORALLY DISINTEGRATING ORAL EVERY 6 HOURS PRN
Status: SHIPPED | OUTPATIENT
Start: 2020-11-05

## 2020-11-05 RX ORDER — ONDANSETRON 4 MG/1
4 TABLET, ORALLY DISINTEGRATING ORAL EVERY 6 HOURS PRN
Qty: 10 TABLET | Refills: 0 | Status: SHIPPED | OUTPATIENT
Start: 2020-11-05 | End: 2021-02-09 | Stop reason: ALTCHOICE

## 2020-11-05 NOTE — TELEPHONE ENCOUNTER
The patient paged me at approximately 6 AM she was having severe nausea and vomiting after a 3 AM dose of Norco she is not sure she even got any down because she started vomiting almost immediately.   I gave instructions to loosen the dressing to take some

## 2020-11-05 NOTE — TELEPHONE ENCOUNTER
WMN - this is the patient you spoke with at about 5am... Any further orders? S/w pt. Crying, upset. The zofran prescription was not at [facility] this morning (ordered, but seemingly not transmitted). zofran re-ordered and sent to [facility], confirmed location with patient. Patient is having a difficult time taking norco. Was able to take it at 6pm and 10pm last night, but after her 3am dose, was experiencing extreme nausea with emesis. Pt has eaten only a small amount of food (part of a hot dog no bun, a few french fries, some soup) since sx. Patient states that she is in a lot of pain, and feels like her \"foot is being squished\",   Patient states she has been keeping her foot elevated. Removed sock at my encouragement to view toes. \"toes look normal\" \"Maybe a little red\" and patient states they feel a little numb and are not swollen. Patient states that Dr Angélica Kumar told her she could unwrap the compression drsg, but she is afraid to. I encouraged her to move the velcro back a few inches at least to let the wrap relax and to call back and discuss if she felt she wanted to remove it more but was still scared. I encouraged patient to take the zofran, try to eat a little more food and take a norco. Keep foot elevated and ice it, and try to get some sleep since she has been up crying most of the night. Patient had been really scared and upset because she has never had surgery and never taken any of these medications. Patient does well with OTC advil and tylenol if she is still unable to take the norco even with the zofran and food. Patient voiced that that was feeling better now. No longer upset or crying. I encouraged her to call back with any further concerns.

## 2020-11-10 ENCOUNTER — OFFICE VISIT (OUTPATIENT)
Dept: PODIATRY CLINIC | Facility: CLINIC | Age: 60
End: 2020-11-10

## 2020-11-10 DIAGNOSIS — Z98.890 STATUS POST FOOT SURGERY: Primary | ICD-10-CM

## 2020-11-10 PROCEDURE — 99024 POSTOP FOLLOW-UP VISIT: CPT | Performed by: PODIATRIST

## 2020-11-10 NOTE — PROGRESS NOTES
Kanika De Paz is a 61year old female.  Patient presents with:  Post-Op: Left foot - 1st visit - had sx on 11/4/2020 - states she is great now - she was vomiting from her medications -         HPI:   Patient returns to the clinic 1 week postop no complain by mouth.         Past Medical History:   Diagnosis Date   • Age-related nuclear cataract of both eyes 10/7/2014   • Anemia    • Celiac disease    • Diabetes ([facility] [address]    • Dysplastic nevus 2019    mid lower back   • Dysplastic nevus 2019    mid lower right nicolasa Never Used    Substance and Sexual Activity      Alcohol use:  Yes        Alcohol/week: 0.0 standard drinks        Comment: 1 glass of wine weekly      Drug use: No    Other Topics      Concerns:        Caffeine Concern: Yes          Tea 1 cup daily;

## 2020-11-16 ENCOUNTER — OFFICE VISIT (OUTPATIENT)
Dept: PODIATRY CLINIC | Facility: CLINIC | Age: 60
End: 2020-11-16

## 2020-11-16 DIAGNOSIS — Z98.890 POST-OPERATIVE STATE: Primary | ICD-10-CM

## 2020-11-16 PROCEDURE — 99024 POSTOP FOLLOW-UP VISIT: CPT | Performed by: PODIATRIST

## 2020-11-17 NOTE — PROGRESS NOTES
Irene Gil is a 61year old female. Patient presents with:  Surgical Followup: SX F/U 11/4/2020 Removal of Bunion of Left Foot - 0/10 Pain - Denies numbness or tingling. HPI:   Patient is now 2 weeks status post surgery doing well overall.   At Past Medical History:   Diagnosis Date   • Age-related nuclear cataract of both eyes 10/7/2014   • Anemia    • Celiac disease    • Diabetes ([facility] [address]    • Dysplastic nevus 2019    mid lower back   • Dysplastic nevus 2019    mid lower right back   • E-coli UT Substance and Sexual Activity      Alcohol use: Yes        Alcohol/week: 0.0 standard drinks        Comment: 1 glass of wine weekly      Drug use: No    Other Topics      Concerns:        Caffeine Concern: Yes          Tea 1 cup daily;          Pt has a pac

## 2020-11-21 ENCOUNTER — HOSPITAL ENCOUNTER (OUTPATIENT)
Dept: GENERAL RADIOLOGY | Age: 60
Discharge: HOME | End: 2020-11-21
Attending: PODIATRIST
Payer: COMMERCIAL

## 2020-11-21 DIAGNOSIS — Z98.890 POST-OPERATIVE STATE: ICD-10-CM

## 2020-11-21 PROCEDURE — 73620 X-RAY EXAM OF FOOT: CPT | Performed by: PODIATRIST

## 2020-11-23 ENCOUNTER — OFFICE VISIT (OUTPATIENT)
Dept: PODIATRY CLINIC | Facility: CLINIC | Age: 60
End: 2020-11-23

## 2020-11-23 DIAGNOSIS — Z98.890 POST-OPERATIVE STATE: Primary | ICD-10-CM

## 2020-11-23 PROCEDURE — 99024 POSTOP FOLLOW-UP VISIT: CPT | Performed by: PODIATRIST

## 2020-11-23 NOTE — PROGRESS NOTES
Kimberly Langston is a 61year old female. Patient presents with:  Post-Op: s/p left foot bunionectomy -- Sx on 11/04/20. Denies any pain but has occasional sharp pain. Denies any fever. Pt wearing CAM boot and ambulating with scooter.          HPI:   Patient by mouth daily. • Nutritional Supplements (ADULT NUTRITIONAL SUPPLEMENT + OR) Take by mouth. \"ASHWAGANDHA\" supplement     • CRANBERRY OR Take by mouth. • Ascorbic Acid (VITAMIN C) 100 MG Oral Tab Take 100 mg by mouth daily.      • Probiotic Produc History    Socioeconomic History      Marital status:       Spouse name: Not on file      Number of children: Not on file      Years of education: Not on file      Highest education level: Not on file    Tobacco Use      Smoking status: Never Smoke

## 2020-11-30 ENCOUNTER — OFFICE VISIT (OUTPATIENT)
Dept: PODIATRY CLINIC | Facility: CLINIC | Age: 60
End: 2020-11-30

## 2020-11-30 DIAGNOSIS — Z98.890 STATUS POST FOOT SURGERY: Primary | ICD-10-CM

## 2020-11-30 PROCEDURE — 99024 POSTOP FOLLOW-UP VISIT: CPT | Performed by: PODIATRIST

## 2020-11-30 NOTE — PROGRESS NOTES
Kimberly Langston is a 61year old female. Patient presents with:  Post-Op: s/p left foot bunionectomy -- Sx on 11/04/20. Denies any pain or fever. HPI:   Patient is now 4 weeks status post surgery overall doing well.   At today's visit reviewed nurse Take by mouth. • Ascorbic Acid (VITAMIN C) 100 MG Oral Tab Take 100 mg by mouth daily. • Probiotic Product (PROBIOTIC DAILY OR) Take by mouth.         Past Medical History:   Diagnosis Date   • Age-related nuclear cataract of both eyes 10/7/2014   • of education: Not on file      Highest education level: Not on file    Tobacco Use      Smoking status: Never Smoker      Smokeless tobacco: Never Used    Substance and Sexual Activity      Alcohol use:  Yes        Alcohol/week: 0.0 standard drinks        C

## 2020-12-10 ENCOUNTER — HOSPITAL ENCOUNTER (OUTPATIENT)
Dept: GENERAL RADIOLOGY | Age: 60
Discharge: HOME | End: 2020-12-10
Attending: PODIATRIST
Payer: COMMERCIAL

## 2020-12-10 DIAGNOSIS — Z98.890 STATUS POST FOOT SURGERY: ICD-10-CM

## 2020-12-10 PROCEDURE — 73620 X-RAY EXAM OF FOOT: CPT | Performed by: PODIATRIST

## 2020-12-14 ENCOUNTER — OFFICE VISIT (OUTPATIENT)
Dept: PODIATRY CLINIC | Facility: CLINIC | Age: 60
End: 2020-12-14

## 2020-12-14 DIAGNOSIS — Z98.890 STATUS POST FOOT SURGERY: Primary | ICD-10-CM

## 2020-12-19 NOTE — PROGRESS NOTES
Rosenda Perez is a 61year old female.  Patient presents with:  Post-Op: s/p L foot bunionectomy f/u - had sx on 11/4/2020 - states she still has swelling when she has regular shoes on         HPI:   Patient now 6 weeks status post surgery doing very well daily.     • Nutritional Supplements (ADULT NUTRITIONAL SUPPLEMENT + OR) Take by mouth. \"ASHWAGANDHA\" supplement     • CRANBERRY OR Take by mouth. • Ascorbic Acid (VITAMIN C) 100 MG Oral Tab Take 100 mg by mouth daily.      • Probiotic Product (PROBIO Socioeconomic History      Marital status:       Spouse name: Not on file      Number of children: Not on file      Years of education: Not on file      Highest education level: Not on file    Tobacco Use      Smoking status: Never Smoker      Smok

## 2021-01-14 ENCOUNTER — TELEPHONE (OUTPATIENT)
Dept: PODIATRY CLINIC | Facility: CLINIC | Age: 61
End: 2021-01-14

## 2021-01-14 NOTE — TELEPHONE ENCOUNTER
Sx 11/4/20 L bunionectomy. LOV 12/14/20    Pain on the top of the foot and third and 4th toe. Hurts to touch and hurts to do anything with. 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  Walking the dog no more than 2 blocks m

## 2021-01-14 NOTE — TELEPHONE ENCOUNTER
Per pt had bunion surgery and is in a lot of pain. Pt is scheduled 1/19 and asking if there is anything she should do.  Please advise

## 2021-01-14 NOTE — TELEPHONE ENCOUNTER
That is fine that she is wearing the boot and she should also be called and just told to stay off the foot for right now thank you.   She should also take some type of anti-inflammatory as long as she does not have a medical contraindication to doing so

## 2021-01-15 NOTE — TELEPHONE ENCOUNTER
Pt notified per Dr. Amado Shah message. She states she already feels a lot better today now that she is back in the boot. She will f/u next wk in office.

## 2021-01-19 ENCOUNTER — TELEPHONE (OUTPATIENT)
Dept: PODIATRY CLINIC | Facility: CLINIC | Age: 61
End: 2021-01-19

## 2021-01-19 ENCOUNTER — OFFICE VISIT (OUTPATIENT)
Dept: PODIATRY CLINIC | Facility: CLINIC | Age: 61
End: 2021-01-19

## 2021-01-19 ENCOUNTER — HOSPITAL ENCOUNTER (OUTPATIENT)
Dept: GENERAL RADIOLOGY | Age: 61
Discharge: HOME | End: 2021-01-19
Attending: PODIATRIST
Payer: COMMERCIAL

## 2021-01-19 VITALS — DIASTOLIC BLOOD PRESSURE: 76 MMHG | RESPIRATION RATE: 16 BRPM | HEART RATE: 69 BPM | SYSTOLIC BLOOD PRESSURE: 132 MMHG

## 2021-01-19 DIAGNOSIS — M79.672 FOOT PAIN, LEFT: Primary | ICD-10-CM

## 2021-01-19 DIAGNOSIS — M77.50 CAPSULITIS OF METATARSOPHALANGEAL (MTP) JOINT: ICD-10-CM

## 2021-01-19 DIAGNOSIS — Z98.890 STATUS POST FOOT SURGERY: ICD-10-CM

## 2021-01-19 DIAGNOSIS — M77.42 METATARSALGIA OF LEFT FOOT: ICD-10-CM

## 2021-01-19 DIAGNOSIS — M79.672 FOOT PAIN, LEFT: ICD-10-CM

## 2021-01-19 PROCEDURE — 3078F DIAST BP <80 MM HG: CPT | Performed by: PODIATRIST

## 2021-01-19 PROCEDURE — 99024 POSTOP FOLLOW-UP VISIT: CPT | Performed by: PODIATRIST

## 2021-01-19 PROCEDURE — 73630 X-RAY EXAM OF FOOT: CPT | Performed by: PODIATRIST

## 2021-01-19 PROCEDURE — 3075F SYST BP GE 130 - 139MM HG: CPT | Performed by: PODIATRIST

## 2021-01-19 PROCEDURE — 20600 DRAIN/INJ JOINT/BURSA W/O US: CPT | Performed by: PODIATRIST

## 2021-01-19 RX ORDER — TRIAMCINOLONE ACETONIDE 40 MG/ML
20 INJECTION, SUSPENSION INTRA-ARTICULAR; INTRAMUSCULAR ONCE
Status: COMPLETED | OUTPATIENT
Start: 2021-01-19 | End: 2021-01-19

## 2021-01-19 RX ADMIN — TRIAMCINOLONE ACETONIDE 20 MG: 40 INJECTION, SUSPENSION INTRA-ARTICULAR; INTRAMUSCULAR at 17:15:00

## 2021-01-19 NOTE — PROGRESS NOTES
Nic Nieves is a 61year old female. 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 pt states she went back into the boot and pain improved. 10/7/2014   • Presbyopia OU 10/7/2014   • Recurrent cold sores    • Vertigo       Past Surgical History:   Procedure Laterality Date   • COLONOSCOPY  2/27/2012    per NG   • COLONOSCOPY N/A 1/22/2018    Performed by Vincenzo French MD at [facility] ENDOSCOP denies shortness of breath with exertion  CARDIOVASCULAR: denies chest pain on exertion  GI: denies abdominal pain and denies heartburn  NEURO: denies headaches    EXAM:   LMP  (LMP Unknown)   GENERAL: well developed, well nourished, in no apparent distres joints of the left foot using aseptic technique and appropriate approach. A Band-Aid was applied to the injection site. The patient indicates understanding of these issues and agrees to the plan.     Steven Christine DPM

## 2021-01-20 NOTE — TELEPHONE ENCOUNTER
visit from 1/19/21  Visit Diagnoses       Foot pain, left M79.672     Status post foot surgery Z98.890     Capsulitis of metatarsophalangeal (MTP) joint M77.50     Metatarsalgia of left foot M77.42 Yes - the patient is able to be screened

## 2021-02-01 DIAGNOSIS — Z23 NEED FOR VACCINATION: ICD-10-CM

## 2021-02-06 ENCOUNTER — NURSE TRIAGE (OUTPATIENT)
Dept: INTERNAL MEDICINE CLINIC | Facility: CLINIC | Age: 61
End: 2021-02-06

## 2021-02-06 RX ORDER — VALACYCLOVIR HYDROCHLORIDE 1 G/1
TABLET, FILM COATED ORAL
Qty: 20 TABLET | Refills: 1 | Status: SHIPPED | OUTPATIENT
Start: 2021-02-06

## 2021-02-06 NOTE — TELEPHONE ENCOUNTER
Action Requested: Summary for Provider     []  Critical Lab, Recommendations Needed  [] Need Additional Advice  []   FYI    []   Need Orders  [x] Need Medications Sent to Pharmacy  []  Other     SUMMARY: Patient stated that received the COVID moderna vacci

## 2021-02-08 ENCOUNTER — TELEPHONE (OUTPATIENT)
Dept: INTERNAL MEDICINE CLINIC | Facility: CLINIC | Age: 61
End: 2021-02-08

## 2021-02-08 NOTE — TELEPHONE ENCOUNTER
Per Dr. Jude Cota, please call in Gabapentin 100 mg 1-2 times a day. Quantity of 60 tabs. Patient should watch for any facial weakness-one sided. Advised patient of 's advice. Patient verbalized understanding.  Prescription called in to [facility]

## 2021-02-08 NOTE — TELEPHONE ENCOUNTER
Patient states she has been taking the Valtrex ( 1 gram by mouth twice daily) since Saturday for fever blisters she had in her nose.       Patient states today she is having tingling and pain on the right side of her face from her forehead, to the right eye

## 2021-02-08 NOTE — TELEPHONE ENCOUNTER
Dr. Radha Hsieh please see pending note to be sent to [facility]. I called the patient and informed her you would advise to stay home for 7 days. She agrees.

## 2021-02-09 ENCOUNTER — OFFICE VISIT (OUTPATIENT)
Dept: PODIATRY CLINIC | Facility: CLINIC | Age: 61
End: 2021-02-09

## 2021-02-09 DIAGNOSIS — M77.50 CAPSULITIS OF METATARSOPHALANGEAL (MTP) JOINT: ICD-10-CM

## 2021-02-09 DIAGNOSIS — M79.672 FOOT PAIN, LEFT: ICD-10-CM

## 2021-02-09 DIAGNOSIS — Z98.890 STATUS POST FOOT SURGERY: ICD-10-CM

## 2021-02-09 DIAGNOSIS — M77.42 METATARSALGIA OF LEFT FOOT: ICD-10-CM

## 2021-02-09 PROCEDURE — L3000 FT INSERT UCB BERKELEY SHELL: HCPCS | Performed by: PODIATRIST

## 2021-02-09 PROCEDURE — 29799 UNLISTED PX CASTING/STRPG: CPT | Performed by: PODIATRIST

## 2021-02-09 NOTE — PROGRESS NOTES
Binu Krishnan is a 61year old female. Patient presents with: Follow - Up: s/p left foot bunionectomy -- sx on 11/04/20. Rates pain 1-2/10 at end of day. Orthotic Status: Pt here to get orthotics casting. Referral has been authorized.          HPI:   P Violet Kent MD at [facility] ENDOSCOPY   • COLPOSCOPY, CERVIX W/UPPER ADJACENT VAGINA; W/BIOPSY(S), CERVIX     • LEEP     •    , , ,     Vaginal forceps in ;  x3      Family History   Problem Relation Age of Onset   • Cancer Fat developed, well nourished, in no apparent distress  EXTREMITIES:   Today while maintaining the patient's feet in a corrected neutral positions plaster of Chichi splints were used to take molds for custom orthotics.   ASSESSMENT AND PLAN:   Diagnoses and all

## 2021-03-18 ENCOUNTER — TELEPHONE (OUTPATIENT)
Dept: PODIATRY CLINIC | Facility: CLINIC | Age: 61
End: 2021-03-18

## 2021-03-18 NOTE — TELEPHONE ENCOUNTER
vesta from [facility] podiatric labs called. Question on orthotic order. What type of device.   Please call

## 2021-03-19 NOTE — TELEPHONE ENCOUNTER
Called and s/w garth at [facility] and informed him per Dr. Byron Cortez message.  He had no further q's

## 2021-04-05 ENCOUNTER — TELEPHONE (OUTPATIENT)
Dept: PODIATRY CLINIC | Facility: CLINIC | Age: 61
End: 2021-04-05

## 2021-04-05 NOTE — TELEPHONE ENCOUNTER
Dr. Magnus Olszewski is it ok for patient to  Orthotics in [facility] without appointment? Ardeth Fitting came in without instructions, how would you like us to proceed with this? Thank you!

## 2021-04-06 NOTE — TELEPHONE ENCOUNTER
Patient will be picking up Orthotics this Friday 4/9 at 3pm in [facility]. I gave patient wearing instructions provided by Dr. Judson Giordano.

## 2021-04-09 ENCOUNTER — TELEPHONE (OUTPATIENT)
Dept: PODIATRY CLINIC | Facility: CLINIC | Age: 61
End: 2021-04-09

## 2021-08-28 ENCOUNTER — NURSE TRIAGE (OUTPATIENT)
Dept: INTERNAL MEDICINE CLINIC | Facility: CLINIC | Age: 61
End: 2021-08-28

## 2021-08-28 NOTE — TELEPHONE ENCOUNTER
Action Requested: Summary for Provider     []  Critical Lab, Recommendations Needed  [] Need Additional Advice  []   FYI    []   Need Orders  [] Need Medications Sent to Pharmacy  []  Other     SUMMARY: Per protocol advised office visit.   Patient requestin

## 2021-08-29 RX ORDER — TOBRAMYCIN 3 MG/ML
2 SOLUTION/ DROPS OPHTHALMIC EVERY 4 HOURS
Qty: 1 EACH | Refills: 0 | Status: SHIPPED | OUTPATIENT
Start: 2021-08-29 | End: 2021-09-05

## 2021-11-03 ENCOUNTER — MED REC SCAN ONLY (OUTPATIENT)
Dept: INTERNAL MEDICINE CLINIC | Facility: CLINIC | Age: 61
End: 2021-11-03

## 2022-01-13 ENCOUNTER — NURSE TRIAGE (OUTPATIENT)
Dept: INTERNAL MEDICINE CLINIC | Facility: CLINIC | Age: 62
End: 2022-01-13

## 2022-01-13 ENCOUNTER — TELEMEDICINE (OUTPATIENT)
Dept: INTERNAL MEDICINE CLINIC | Facility: CLINIC | Age: 62
End: 2022-01-13

## 2022-01-13 DIAGNOSIS — R51.9 GENERALIZED HEADACHES: ICD-10-CM

## 2022-01-13 DIAGNOSIS — Z83.49 FAMILY HISTORY OF GRAVES' DISEASE: ICD-10-CM

## 2022-01-13 DIAGNOSIS — R53.83 FATIGUE, UNSPECIFIED TYPE: Primary | ICD-10-CM

## 2022-01-13 DIAGNOSIS — M25.512 ACUTE PAIN OF LEFT SHOULDER: ICD-10-CM

## 2022-01-13 PROCEDURE — 99214 OFFICE O/P EST MOD 30 MIN: CPT | Performed by: PHYSICIAN ASSISTANT

## 2022-01-13 NOTE — PROGRESS NOTES
Please note that the following visit was completed using two-way, real-time interactive audio and/or video communication.   This has been done in good eduar to provide continuity of care in the best interest of the provider-patient relationship, due to the valACYclovir HCl (VALTREX) 1 G Oral Tab 1 tablet p.o. twice daily 20 tablet 1   • ergocalciferol 41204 units Oral Cap TK 1 C PO 1 TIME A WK 12 capsule 1   • Cholecalciferol 5000 units Oral Tab Take 1 tablet by mouth daily.      • Cyanocobalamin (VITAMIN B-1 • Vertigo          PHYSICAL EXAM:     Vitals signs taken at home if available:    Limited examination for this telephone visit due to the coronavirus emergency    Patient was speaking in complete sentences, no increased work of breathing and very coheren ability. Patient to call back if any change/ worsening of symptoms.     Orders Placed This Encounter      CBC With Differential With Platelet      Comp Metabolic Panel (14)      TSH W Reflex To Free T4      Urinalysis, Routine      Vitamin D      Vitamin

## 2022-01-13 NOTE — TELEPHONE ENCOUNTER
Please reply to pool: EM RN TRIAGE  Action Requested: Summary for Provider     []  Critical Lab, Recommendations Needed  [] Need Additional Advice  []   FYI    []   Need Orders  [] Need Medications Sent to Pharmacy  []  Other     SUMMARY: video appoi
Followed protocol/ancef

## 2022-01-14 ENCOUNTER — EKG ENCOUNTER (OUTPATIENT)
Dept: LAB | Facility: HOSPITAL | Age: 62
End: 2022-01-14
Attending: PHYSICIAN ASSISTANT
Payer: COMMERCIAL

## 2022-01-14 ENCOUNTER — TELEPHONE (OUTPATIENT)
Dept: INTERNAL MEDICINE CLINIC | Facility: CLINIC | Age: 62
End: 2022-01-14

## 2022-01-14 DIAGNOSIS — R53.83 FATIGUE, UNSPECIFIED TYPE: ICD-10-CM

## 2022-01-14 DIAGNOSIS — R94.31 ABNORMAL EKG: ICD-10-CM

## 2022-01-14 DIAGNOSIS — M25.512 ACUTE PAIN OF LEFT SHOULDER: ICD-10-CM

## 2022-01-14 DIAGNOSIS — E03.8 OTHER SPECIFIED HYPOTHYROIDISM: ICD-10-CM

## 2022-01-14 DIAGNOSIS — Z83.49 FAMILY HISTORY OF GRAVES' DISEASE: ICD-10-CM

## 2022-01-14 DIAGNOSIS — R82.71 BACTERIA IN URINE: Primary | ICD-10-CM

## 2022-01-14 DIAGNOSIS — M79.602 LEFT ARM PAIN: ICD-10-CM

## 2022-01-14 DIAGNOSIS — R51.9 GENERALIZED HEADACHES: ICD-10-CM

## 2022-01-14 LAB
ALBUMIN SERPL-MCNC: 3.6 G/DL (ref 3.4–5)
ALBUMIN/GLOB SERPL: 1 {RATIO} (ref 1–2)
ALP LIVER SERPL-CCNC: 86 U/L
ALT SERPL-CCNC: 24 U/L
ANION GAP SERPL CALC-SCNC: 6 MMOL/L (ref 0–18)
AST SERPL-CCNC: 13 U/L (ref 15–37)
BASOPHILS # BLD AUTO: 0.06 X10(3) UL (ref 0–0.2)
BASOPHILS NFR BLD AUTO: 0.8 %
BILIRUB SERPL-MCNC: 0.6 MG/DL (ref 0.1–2)
BILIRUB UR QL: NEGATIVE
BUN BLD-MCNC: 21 MG/DL (ref 7–18)
BUN/CREAT SERPL: 23.6 (ref 10–20)
CALCIUM BLD-MCNC: 9.2 MG/DL (ref 8.5–10.1)
CHLORIDE SERPL-SCNC: 109 MMOL/L (ref 98–112)
CO2 SERPL-SCNC: 27 MMOL/L (ref 21–32)
COLOR UR: YELLOW
CREAT BLD-MCNC: 0.89 MG/DL
DEPRECATED RDW RBC AUTO: 43.3 FL (ref 35.1–46.3)
EOSINOPHIL # BLD AUTO: 0.11 X10(3) UL (ref 0–0.7)
EOSINOPHIL NFR BLD AUTO: 1.5 %
ERYTHROCYTE [DISTWIDTH] IN BLOOD BY AUTOMATED COUNT: 13.2 % (ref 11–15)
FASTING STATUS PATIENT QL REPORTED: YES
GLOBULIN PLAS-MCNC: 3.7 G/DL (ref 2.8–4.4)
GLUCOSE BLD-MCNC: 98 MG/DL (ref 70–99)
GLUCOSE UR-MCNC: NEGATIVE MG/DL
HCT VFR BLD AUTO: 41.4 %
HGB BLD-MCNC: 13.3 G/DL
IMM GRANULOCYTES # BLD AUTO: 0.01 X10(3) UL (ref 0–1)
IMM GRANULOCYTES NFR BLD: 0.1 %
KETONES UR-MCNC: NEGATIVE MG/DL
LYMPHOCYTES # BLD AUTO: 2.55 X10(3) UL (ref 1–4)
LYMPHOCYTES NFR BLD AUTO: 35.9 %
MCH RBC QN AUTO: 28.6 PG (ref 26–34)
MCHC RBC AUTO-ENTMCNC: 32.1 G/DL (ref 31–37)
MCV RBC AUTO: 89 FL
MONOCYTES # BLD AUTO: 0.64 X10(3) UL (ref 0.1–1)
MONOCYTES NFR BLD AUTO: 9 %
NEUTROPHILS # BLD AUTO: 3.74 X10 (3) UL (ref 1.5–7.7)
NEUTROPHILS # BLD AUTO: 3.74 X10(3) UL (ref 1.5–7.7)
NEUTROPHILS NFR BLD AUTO: 52.7 %
NITRITE UR QL STRIP.AUTO: POSITIVE
OSMOLALITY SERPL CALC.SUM OF ELEC: 297 MOSM/KG (ref 275–295)
PH UR: 5 [PH] (ref 5–8)
PLATELET # BLD AUTO: 377 10(3)UL (ref 150–450)
POTASSIUM SERPL-SCNC: 4.5 MMOL/L (ref 3.5–5.1)
PROT SERPL-MCNC: 7.3 G/DL (ref 6.4–8.2)
PROT UR-MCNC: 30 MG/DL
RBC # BLD AUTO: 4.65 X10(6)UL
SODIUM SERPL-SCNC: 142 MMOL/L (ref 136–145)
SP GR UR STRIP: 1.02 (ref 1–1.03)
T3 SERPL-MCNC: 101 NG/DL (ref 60–181)
T4 FREE SERPL-MCNC: 1.1 NG/DL (ref 0.8–1.7)
TSI SER-ACNC: 4.36 MIU/ML (ref 0.36–3.74)
UROBILINOGEN UR STRIP-ACNC: <2
VIT B12 SERPL-MCNC: 692 PG/ML (ref 193–986)
VIT D+METAB SERPL-MCNC: 16.6 NG/ML (ref 30–100)
WBC # BLD AUTO: 7.1 X10(3) UL (ref 4–11)

## 2022-01-14 PROCEDURE — 93010 ELECTROCARDIOGRAM REPORT: CPT | Performed by: PHYSICIAN ASSISTANT

## 2022-01-14 PROCEDURE — 81001 URINALYSIS AUTO W/SCOPE: CPT

## 2022-01-14 PROCEDURE — 93005 ELECTROCARDIOGRAM TRACING: CPT

## 2022-01-14 PROCEDURE — 84439 ASSAY OF FREE THYROXINE: CPT

## 2022-01-14 PROCEDURE — 85025 COMPLETE CBC W/AUTO DIFF WBC: CPT

## 2022-01-14 PROCEDURE — 80053 COMPREHEN METABOLIC PANEL: CPT

## 2022-01-14 PROCEDURE — 82607 VITAMIN B-12: CPT

## 2022-01-14 PROCEDURE — 84480 ASSAY TRIIODOTHYRONINE (T3): CPT

## 2022-01-14 PROCEDURE — 82306 VITAMIN D 25 HYDROXY: CPT

## 2022-01-14 PROCEDURE — 84443 ASSAY THYROID STIM HORMONE: CPT

## 2022-01-14 PROCEDURE — 36415 COLL VENOUS BLD VENIPUNCTURE: CPT

## 2022-01-14 RX ORDER — ERGOCALCIFEROL 1.25 MG/1
50000 CAPSULE ORAL
Qty: 12 CAPSULE | Refills: 0 | Status: SHIPPED | OUTPATIENT
Start: 2022-01-14 | End: 2022-04-02

## 2022-01-14 NOTE — TELEPHONE ENCOUNTER
Dr. Emmanuel Vásquez for Nick AGUILAR:    Patient had video visit yesterday. She saw her EKG and other abnormal lab values in chart and worried/concerned. She asked if you can please review them.   She is planning to leave to [place] today, but doesn't want t

## 2022-01-14 NOTE — PROGRESS NOTES
Vitamin d script sent to pharm on file  Repeat tsh in 3 months  Obtain urine culture to rule out infection  Abnormal ekg, will do stress test  Pt gets tested weekly for work, tested negative for COVID today

## 2022-01-18 ENCOUNTER — LAB ENCOUNTER (OUTPATIENT)
Dept: LAB | Facility: HOSPITAL | Age: 62
End: 2022-01-18
Attending: PHYSICIAN ASSISTANT
Payer: COMMERCIAL

## 2022-01-18 DIAGNOSIS — Z20.822 ENCOUNTER FOR SCREENING LABORATORY TESTING FOR COVID-19 VIRUS: ICD-10-CM

## 2022-01-18 DIAGNOSIS — Z01.812 PRE-PROCEDURE LAB EXAM: ICD-10-CM

## 2022-01-18 DIAGNOSIS — R82.71 BACTERIA IN URINE: ICD-10-CM

## 2022-01-18 PROCEDURE — 87186 SC STD MICRODIL/AGAR DIL: CPT

## 2022-01-18 PROCEDURE — 87077 CULTURE AEROBIC IDENTIFY: CPT

## 2022-01-18 PROCEDURE — 87086 URINE CULTURE/COLONY COUNT: CPT

## 2022-01-19 LAB — SARS-COV-2 RNA RESP QL NAA+PROBE: NOT DETECTED

## 2022-01-21 ENCOUNTER — HOSPITAL ENCOUNTER (OUTPATIENT)
Dept: CV DIAGNOSTICS | Facility: HOSPITAL | Age: 62
Discharge: HOME | End: 2022-01-21
Attending: PHYSICIAN ASSISTANT
Payer: COMMERCIAL

## 2022-01-21 DIAGNOSIS — M79.602 LEFT ARM PAIN: ICD-10-CM

## 2022-01-21 DIAGNOSIS — R94.31 ABNORMAL EKG: ICD-10-CM

## 2022-01-21 PROCEDURE — 93017 CV STRESS TEST TRACING ONLY: CPT | Performed by: PHYSICIAN ASSISTANT

## 2022-01-21 PROCEDURE — 93018 CV STRESS TEST I&R ONLY: CPT | Performed by: PHYSICIAN ASSISTANT

## 2022-03-08 ENCOUNTER — OFFICE VISIT (OUTPATIENT)
Dept: INTERNAL MEDICINE CLINIC | Facility: CLINIC | Age: 62
End: 2022-03-08
Payer: COMMERCIAL

## 2022-03-08 ENCOUNTER — LAB ENCOUNTER (OUTPATIENT)
Dept: LAB | Age: 62
End: 2022-03-08
Attending: PHYSICIAN ASSISTANT
Payer: COMMERCIAL

## 2022-03-08 ENCOUNTER — NURSE TRIAGE (OUTPATIENT)
Dept: INTERNAL MEDICINE CLINIC | Facility: CLINIC | Age: 62
End: 2022-03-08

## 2022-03-08 VITALS
BODY MASS INDEX: 34.16 KG/M2 | HEART RATE: 108 BPM | SYSTOLIC BLOOD PRESSURE: 145 MMHG | DIASTOLIC BLOOD PRESSURE: 83 MMHG | WEIGHT: 174 LBS | HEIGHT: 60 IN

## 2022-03-08 DIAGNOSIS — R30.0 DYSURIA: Primary | ICD-10-CM

## 2022-03-08 DIAGNOSIS — Z87.440 HISTORY OF RECURRENT UTIS: ICD-10-CM

## 2022-03-08 LAB
BILIRUB UR QL: NEGATIVE
CLARITY UR: CLEAR
COLOR UR: YELLOW
GLUCOSE UR-MCNC: NEGATIVE MG/DL
KETONES UR-MCNC: NEGATIVE MG/DL
NITRITE UR QL STRIP.AUTO: NEGATIVE
PH UR: 6 [PH] (ref 5–8)
PROT UR-MCNC: NEGATIVE MG/DL
SP GR UR STRIP: 1.01 (ref 1–1.03)
UROBILINOGEN UR STRIP-ACNC: <2
VIT C UR-MCNC: NEGATIVE MG/DL

## 2022-03-08 PROCEDURE — 3079F DIAST BP 80-89 MM HG: CPT | Performed by: PHYSICIAN ASSISTANT

## 2022-03-08 PROCEDURE — 3077F SYST BP >= 140 MM HG: CPT | Performed by: PHYSICIAN ASSISTANT

## 2022-03-08 PROCEDURE — 99213 OFFICE O/P EST LOW 20 MIN: CPT | Performed by: PHYSICIAN ASSISTANT

## 2022-03-08 PROCEDURE — 87086 URINE CULTURE/COLONY COUNT: CPT | Performed by: PHYSICIAN ASSISTANT

## 2022-03-08 PROCEDURE — 87077 CULTURE AEROBIC IDENTIFY: CPT | Performed by: PHYSICIAN ASSISTANT

## 2022-03-08 PROCEDURE — 3008F BODY MASS INDEX DOCD: CPT | Performed by: PHYSICIAN ASSISTANT

## 2022-03-08 PROCEDURE — 87186 SC STD MICRODIL/AGAR DIL: CPT | Performed by: PHYSICIAN ASSISTANT

## 2022-03-08 PROCEDURE — 81001 URINALYSIS AUTO W/SCOPE: CPT | Performed by: PHYSICIAN ASSISTANT

## 2022-04-24 LAB — AMB EXT COVID-19 RESULT: DETECTED

## 2022-04-25 ENCOUNTER — TELEMEDICINE (OUTPATIENT)
Dept: INTERNAL MEDICINE CLINIC | Facility: CLINIC | Age: 62
End: 2022-04-25

## 2022-04-25 ENCOUNTER — NURSE TRIAGE (OUTPATIENT)
Dept: INTERNAL MEDICINE CLINIC | Facility: CLINIC | Age: 62
End: 2022-04-25

## 2022-04-25 DIAGNOSIS — U07.1 COVID-19: Primary | ICD-10-CM

## 2022-04-25 PROCEDURE — 99213 OFFICE O/P EST LOW 20 MIN: CPT | Performed by: INTERNAL MEDICINE

## 2022-06-22 NOTE — PATIENT INSTRUCTIONS
Chief Complaint  Office Visit (Pansinusitis/)      History of Present Illness  Gonzalo Sadler is a 48 year old male seen today for follow-up of nasal and sinus complaints.  Since starting the combination of frequent saline rinse and daily fluticasone nasal spray the patient has reported dramatic overall improvement in his symptoms.  Headaches have been improved.  He feels his allergies are under good control.  He is waking up refreshed in the morning and able to breathe through his nose well.    Review of Systems  Comprehensive review of systems reviewed with patient. Pertinent positives are listed in HPI    Past Medical History  The past medical history was reviewed on Epic.    Medications     Summary of your Discharge Medications          Accurate as of June 22, 2022  4:06 PM. Always use your most recent med list.            Take these Medications      Details   albuterol 108 (90 Base) MCG/ACT inhaler   Inhale 2 puffs into the lungs every 4 hours as needed for Shortness of Breath or Wheezing.     fluticasone 50 MCG/ACT nasal spray  Commonly known as: FLONASE   SHAKE LIQUID AND USE 2 SPRAYS IN EACH NOSTRIL EVERY NIGHT  Comment: **Patient requests 90 days supply**     ondansetron 8 MG tablet  Commonly known as: ZOFRAN   Take 1 tablet by mouth every 8 hours as needed for Nausea.     sumatriptan 100 MG tablet  Commonly known as: IMITREX   Take 1 tablet by mouth as needed for Migraine.             Allergies  ALLERGIES:  Ciprofloxacin        Physical Examination    There is no height or weight on file to calculate BMI.    General/Constitutional: Patient in no distress, speech and affect normal  Respiration: Quiet and unlabored, no stridor nor dysphonia  External Inspection Face/Neck: No lesions, swelling, nor erythema  External Ears: No swelling, erythema nor tenderness  Otoscopic Exam: Normal canals, normal drums  Anterior Rhinoscopy:  There is a mild deviation of the septum to the right but his turbinates are  Problem List Items Addressed This Visit        Unprioritized    Atypical nevi    Relevant Orders    DERM - INTERNAL    Bunion of great toe of left foot     Bunion of the left great toe. Referral to Dr. Ashwin Chaney provided.          Relevant Orders    PODIATRY 04/28/2016   • Influenza 11/14/2007, 10/01/2014, 10/01/2015, 10/27/2016, 10/25/2018, 10/01/2019   • Pneumovax 23 04/28/2016   • TDAP 04/28/2016              Relevant Orders    CBC WITH DIFFERENTIAL WITH PLATELET    COMP METABOLIC PANEL (14)    LIPID PANEL markedly reduced in size when compared with his last visit.  There is no pus or polyps seen.  Oral Exam: No oral lesions, dentition in satisfactory repair, tongue mobility normal  Pharyngeal Exam: No pharyngeal lesions, no erythema nor exudate, palate elevates midline  Larynx:  Normal voice  Neck/Carotid: No adenopathy  Thyroid: No thyromegaly  Trachea: Midline nontender  Salivary Glands: Soft, symmetric, nontender, without mass  Cranial Nerves/Neurologic Exam: 2-12 normal, patient alert and oriented  Extraocular Exam: No periorbital swelling nor erythema    IMPRESSION  1. Chronic rhinitis  2. Septal deviation    Patient has had significant overall improvement in symptoms with a combination of saline nasal rinse and starting fluticasone nasal spray    PLAN   I have recommended that he remain on the current regimen.  He should be seen once a year for follow-up or otherwise as needed

## 2022-08-04 ENCOUNTER — LAB ENCOUNTER (OUTPATIENT)
Dept: LAB | Facility: HOSPITAL | Age: 62
End: 2022-08-04
Attending: INTERNAL MEDICINE
Payer: COMMERCIAL

## 2022-08-04 ENCOUNTER — NURSE TRIAGE (OUTPATIENT)
Dept: INTERNAL MEDICINE CLINIC | Facility: CLINIC | Age: 62
End: 2022-08-04

## 2022-08-04 ENCOUNTER — OFFICE VISIT (OUTPATIENT)
Dept: INTERNAL MEDICINE CLINIC | Facility: CLINIC | Age: 62
End: 2022-08-04
Payer: COMMERCIAL

## 2022-08-04 VITALS
OXYGEN SATURATION: 98 % | HEIGHT: 60 IN | HEART RATE: 92 BPM | TEMPERATURE: 97 F | BODY MASS INDEX: 29.06 KG/M2 | SYSTOLIC BLOOD PRESSURE: 118 MMHG | WEIGHT: 148 LBS | DIASTOLIC BLOOD PRESSURE: 76 MMHG

## 2022-08-04 DIAGNOSIS — R79.89 ELEVATED LFTS: ICD-10-CM

## 2022-08-04 DIAGNOSIS — R19.7 DIARRHEA OF PRESUMED INFECTIOUS ORIGIN: ICD-10-CM

## 2022-08-04 DIAGNOSIS — R53.83 FATIGUE, UNSPECIFIED TYPE: ICD-10-CM

## 2022-08-04 DIAGNOSIS — R53.83 FATIGUE, UNSPECIFIED TYPE: Primary | ICD-10-CM

## 2022-08-04 DIAGNOSIS — Z01.419 ENCOUNTER FOR WELL WOMAN EXAM WITH ROUTINE GYNECOLOGICAL EXAM: ICD-10-CM

## 2022-08-04 DIAGNOSIS — Z12.31 ENCOUNTER FOR SCREENING MAMMOGRAM FOR MALIGNANT NEOPLASM OF BREAST: ICD-10-CM

## 2022-08-04 PROBLEM — F07.81 POST CONCUSSION SYNDROME: Status: RESOLVED | Noted: 2019-12-03 | Resolved: 2022-08-04

## 2022-08-04 PROBLEM — B02.9 HERPES ZOSTER WITHOUT COMPLICATION: Status: RESOLVED | Noted: 2020-04-06 | Resolved: 2022-08-04

## 2022-08-04 LAB
ALBUMIN SERPL-MCNC: 3.3 G/DL (ref 3.4–5)
ALBUMIN SERPL-MCNC: 3.3 G/DL (ref 3.4–5)
ALBUMIN/GLOB SERPL: 0.8 {RATIO} (ref 1–2)
ALBUMIN/GLOB SERPL: 0.8 {RATIO} (ref 1–2)
ALP LIVER SERPL-CCNC: 104 U/L
ALP LIVER SERPL-CCNC: 107 U/L
ALT SERPL-CCNC: 90 U/L
ALT SERPL-CCNC: 99 U/L
ANION GAP SERPL CALC-SCNC: 6 MMOL/L (ref 0–18)
ANION GAP SERPL CALC-SCNC: 7 MMOL/L (ref 0–18)
AST SERPL-CCNC: 60 U/L (ref 15–37)
AST SERPL-CCNC: 69 U/L (ref 15–37)
BASOPHILS # BLD AUTO: 0.08 X10(3) UL (ref 0–0.2)
BASOPHILS NFR BLD AUTO: 1.3 %
BILIRUB SERPL-MCNC: 0.4 MG/DL (ref 0.1–2)
BILIRUB SERPL-MCNC: 0.4 MG/DL (ref 0.1–2)
BUN BLD-MCNC: 12 MG/DL (ref 7–18)
BUN BLD-MCNC: 12 MG/DL (ref 7–18)
BUN/CREAT SERPL: 13.3 (ref 10–20)
BUN/CREAT SERPL: 13.3 (ref 10–20)
CALCIUM BLD-MCNC: 8.5 MG/DL (ref 8.5–10.1)
CALCIUM BLD-MCNC: 8.9 MG/DL (ref 8.5–10.1)
CHLORIDE SERPL-SCNC: 105 MMOL/L (ref 98–112)
CHLORIDE SERPL-SCNC: 106 MMOL/L (ref 98–112)
CO2 SERPL-SCNC: 26 MMOL/L (ref 21–32)
CO2 SERPL-SCNC: 26 MMOL/L (ref 21–32)
CREAT BLD-MCNC: 0.9 MG/DL
CREAT BLD-MCNC: 0.9 MG/DL
DEPRECATED RDW RBC AUTO: 44.2 FL (ref 35.1–46.3)
EOSINOPHIL # BLD AUTO: 0.04 X10(3) UL (ref 0–0.7)
EOSINOPHIL NFR BLD AUTO: 0.6 %
ERYTHROCYTE [DISTWIDTH] IN BLOOD BY AUTOMATED COUNT: 13.8 % (ref 11–15)
FASTING STATUS PATIENT QL REPORTED: NO
FASTING STATUS PATIENT QL REPORTED: YES
GFR SERPLBLD BASED ON 1.73 SQ M-ARVRAT: 72 ML/MIN/1.73M2 (ref 60–?)
GFR SERPLBLD BASED ON 1.73 SQ M-ARVRAT: 72 ML/MIN/1.73M2 (ref 60–?)
GLOBULIN PLAS-MCNC: 4.2 G/DL (ref 2.8–4.4)
GLOBULIN PLAS-MCNC: 4.2 G/DL (ref 2.8–4.4)
GLUCOSE BLD-MCNC: 88 MG/DL (ref 70–99)
GLUCOSE BLD-MCNC: 94 MG/DL (ref 70–99)
HAV IGM SER QL: NONREACTIVE
HBV CORE IGM SER QL: NONREACTIVE
HBV SURFACE AG SERPL QL IA: NONREACTIVE
HCT VFR BLD AUTO: 43.5 %
HCV AB SERPL QL IA: NONREACTIVE
HGB BLD-MCNC: 14.2 G/DL
IMM GRANULOCYTES # BLD AUTO: 0.03 X10(3) UL (ref 0–1)
IMM GRANULOCYTES NFR BLD: 0.5 %
LYMPHOCYTES # BLD AUTO: 3.06 X10(3) UL (ref 1–4)
LYMPHOCYTES NFR BLD AUTO: 48.6 %
MCH RBC QN AUTO: 28.6 PG (ref 26–34)
MCHC RBC AUTO-ENTMCNC: 32.6 G/DL (ref 31–37)
MCV RBC AUTO: 87.5 FL
MONOCYTES # BLD AUTO: 0.47 X10(3) UL (ref 0.1–1)
MONOCYTES NFR BLD AUTO: 7.5 %
NEUTROPHILS # BLD AUTO: 2.62 X10 (3) UL (ref 1.5–7.7)
NEUTROPHILS # BLD AUTO: 2.62 X10(3) UL (ref 1.5–7.7)
NEUTROPHILS NFR BLD AUTO: 41.5 %
OSMOLALITY SERPL CALC.SUM OF ELEC: 283 MOSM/KG (ref 275–295)
OSMOLALITY SERPL CALC.SUM OF ELEC: 288 MOSM/KG (ref 275–295)
PLATELET # BLD AUTO: 274 10(3)UL (ref 150–450)
POTASSIUM SERPL-SCNC: 3.8 MMOL/L (ref 3.5–5.1)
POTASSIUM SERPL-SCNC: 4 MMOL/L (ref 3.5–5.1)
PROT SERPL-MCNC: 7.5 G/DL (ref 6.4–8.2)
PROT SERPL-MCNC: 7.5 G/DL (ref 6.4–8.2)
RBC # BLD AUTO: 4.97 X10(6)UL
SODIUM SERPL-SCNC: 137 MMOL/L (ref 136–145)
SODIUM SERPL-SCNC: 139 MMOL/L (ref 136–145)
TSI SER-ACNC: 2.04 MIU/ML (ref 0.36–3.74)
VIT B12 SERPL-MCNC: 659 PG/ML (ref 193–986)
WBC # BLD AUTO: 6.3 X10(3) UL (ref 4–11)

## 2022-08-04 PROCEDURE — 84443 ASSAY THYROID STIM HORMONE: CPT

## 2022-08-04 PROCEDURE — 85025 COMPLETE CBC W/AUTO DIFF WBC: CPT

## 2022-08-04 PROCEDURE — 36415 COLL VENOUS BLD VENIPUNCTURE: CPT

## 2022-08-04 PROCEDURE — 99214 OFFICE O/P EST MOD 30 MIN: CPT | Performed by: INTERNAL MEDICINE

## 2022-08-04 PROCEDURE — 80053 COMPREHEN METABOLIC PANEL: CPT

## 2022-08-04 PROCEDURE — 85060 BLOOD SMEAR INTERPRETATION: CPT

## 2022-08-04 PROCEDURE — 3074F SYST BP LT 130 MM HG: CPT | Performed by: INTERNAL MEDICINE

## 2022-08-04 PROCEDURE — 3078F DIAST BP <80 MM HG: CPT | Performed by: INTERNAL MEDICINE

## 2022-08-04 PROCEDURE — 3008F BODY MASS INDEX DOCD: CPT | Performed by: INTERNAL MEDICINE

## 2022-08-04 PROCEDURE — 80074 ACUTE HEPATITIS PANEL: CPT

## 2022-08-04 PROCEDURE — 82607 VITAMIN B-12: CPT

## 2022-11-22 ENCOUNTER — TELEPHONE (OUTPATIENT)
Facility: CLINIC | Age: 62
End: 2022-11-22

## 2022-11-22 NOTE — TELEPHONE ENCOUNTER
----- Message from [place] ElleEdmar sent at 11/22/2022  8:59 AM CST -----  Regarding: Recall Colon  Shamika Blum CMA  [facility] Clinical Staff  Recall colon in 5 years per GS.  Colon done 1-22-18

## 2022-12-18 ENCOUNTER — PATIENT MESSAGE (OUTPATIENT)
Dept: INTERNAL MEDICINE CLINIC | Facility: CLINIC | Age: 62
End: 2022-12-18

## 2022-12-19 ENCOUNTER — NURSE TRIAGE (OUTPATIENT)
Dept: INTERNAL MEDICINE CLINIC | Facility: CLINIC | Age: 62
End: 2022-12-19

## 2022-12-19 NOTE — TELEPHONE ENCOUNTER
From: Johnella Prader  To: Damaris Sawyer MD  Sent: 12/18/2022 6:52 PM CST  Subject: Valaciclovir prescription    Over the years I have been plagued with cold sores I have had ongoing prescriptions of valtrex as when I get sick or my resistance goes down I get horrendous sores in and around my nose and lips. I currently have a bad cold and cough and I can see redness and feel the tingling under the skin so know what is to come. I had three pills left in a prior prescription and took one this morning and am hoping I can get a refill tomorrow sometime. Thank you for your prompt response.    Johnella Prader [facility]   [phone]

## 2022-12-19 NOTE — TELEPHONE ENCOUNTER
See triage encounter.     Future Appointments   Date Time Provider Alyssa Washburn   12/20/2022 11:15 AM Shar Walker MD [facility]

## 2022-12-20 ENCOUNTER — TELEMEDICINE (OUTPATIENT)
Dept: INTERNAL MEDICINE CLINIC | Facility: CLINIC | Age: 62
End: 2022-12-20
Payer: COMMERCIAL

## 2022-12-20 ENCOUNTER — TELEPHONE (OUTPATIENT)
Dept: INTERNAL MEDICINE CLINIC | Facility: CLINIC | Age: 62
End: 2022-12-20

## 2022-12-20 DIAGNOSIS — R05.1 ACUTE COUGH: Primary | ICD-10-CM

## 2022-12-20 DIAGNOSIS — D12.6 ADENOMATOUS POLYP OF COLON, UNSPECIFIED PART OF COLON: ICD-10-CM

## 2022-12-20 PROCEDURE — 99213 OFFICE O/P EST LOW 20 MIN: CPT | Performed by: INTERNAL MEDICINE

## 2022-12-20 RX ORDER — BENZONATATE 100 MG/1
100 CAPSULE ORAL 3 TIMES DAILY PRN
Qty: 20 CAPSULE | Refills: 0 | Status: SHIPPED | OUTPATIENT
Start: 2022-12-20 | End: 2022-12-27

## 2022-12-20 RX ORDER — VALACYCLOVIR HYDROCHLORIDE 1 G/1
TABLET, FILM COATED ORAL
Qty: 20 TABLET | Refills: 0 | Status: SHIPPED | OUTPATIENT
Start: 2022-12-20 | End: 2022-12-20

## 2022-12-20 RX ORDER — VALACYCLOVIR HYDROCHLORIDE 1 G/1
1000 TABLET, FILM COATED ORAL 2 TIMES DAILY
Qty: 20 TABLET | Refills: 0 | Status: SHIPPED | OUTPATIENT
Start: 2022-12-20

## 2022-12-20 RX ORDER — AZITHROMYCIN 250 MG/1
TABLET, FILM COATED ORAL
Qty: 6 TABLET | Refills: 0 | Status: SHIPPED | OUTPATIENT
Start: 2022-12-20 | End: 2022-12-25

## 2023-01-17 ENCOUNTER — APPOINTMENT (OUTPATIENT)
Dept: CV DIAGNOSTICS | Facility: HOSPITAL | Age: 63
End: 2023-01-17
Attending: Other
Payer: COMMERCIAL

## 2023-01-17 ENCOUNTER — APPOINTMENT (OUTPATIENT)
Dept: CT IMAGING | Facility: HOSPITAL | Age: 63
End: 2023-01-17
Attending: EMERGENCY MEDICINE
Payer: COMMERCIAL

## 2023-01-17 ENCOUNTER — HOSPITAL ENCOUNTER (OUTPATIENT)
Facility: HOSPITAL | Age: 63
Setting detail: OBSERVATION
Discharge: HOME | End: 2023-01-19
Attending: EMERGENCY MEDICINE | Admitting: HOSPITALIST
Payer: COMMERCIAL

## 2023-01-17 ENCOUNTER — APPOINTMENT (OUTPATIENT)
Dept: MRI IMAGING | Facility: HOSPITAL | Age: 63
End: 2023-01-17
Attending: Other
Payer: COMMERCIAL

## 2023-01-17 DIAGNOSIS — G45.9 TRANSIENT ISCHEMIC ATTACK (TIA): ICD-10-CM

## 2023-01-17 DIAGNOSIS — N39.0 URINARY TRACT INFECTION WITHOUT HEMATURIA, SITE UNSPECIFIED: ICD-10-CM

## 2023-01-17 DIAGNOSIS — G47.33 OSA (OBSTRUCTIVE SLEEP APNEA): ICD-10-CM

## 2023-01-17 DIAGNOSIS — R47.1 DYSARTHRIA: ICD-10-CM

## 2023-01-17 DIAGNOSIS — R29.818 TRANSIENT NEUROLOGICAL SYMPTOMS: Primary | ICD-10-CM

## 2023-01-17 DIAGNOSIS — R47.01 APHASIA: ICD-10-CM

## 2023-01-17 LAB
ANION GAP SERPL CALC-SCNC: 8 MMOL/L (ref 0–18)
APTT PPP: 30 SECONDS (ref 23.3–35.6)
ATRIAL RATE: 115 BPM
BASOPHILS # BLD: 0 X10(3) UL (ref 0–0.2)
BASOPHILS NFR BLD: 0 %
BILIRUB UR QL: NEGATIVE
BUN BLD-MCNC: 15 MG/DL (ref 7–18)
BUN/CREAT SERPL: 18.3 (ref 10–20)
CALCIUM BLD-MCNC: 8.9 MG/DL (ref 8.5–10.1)
CHLORIDE SERPL-SCNC: 106 MMOL/L (ref 98–112)
CHOLEST SERPL-MCNC: 196 MG/DL (ref ?–200)
CLARITY UR: CLEAR
CO2 SERPL-SCNC: 26 MMOL/L (ref 21–32)
COLOR UR: YELLOW
CREAT BLD-MCNC: 0.82 MG/DL
DEPRECATED RDW RBC AUTO: 41 FL (ref 35.1–46.3)
EOSINOPHIL # BLD: 0 X10(3) UL (ref 0–0.7)
EOSINOPHIL NFR BLD: 0 %
ERYTHROCYTE [DISTWIDTH] IN BLOOD BY AUTOMATED COUNT: 12.7 % (ref 11–15)
EST. AVERAGE GLUCOSE BLD GHB EST-MCNC: 111 MG/DL (ref 68–126)
GFR SERPLBLD BASED ON 1.73 SQ M-ARVRAT: 81 ML/MIN/1.73M2 (ref 60–?)
GLUCOSE BLD-MCNC: 130 MG/DL (ref 70–99)
GLUCOSE BLDC GLUCOMTR-MCNC: 107 MG/DL (ref 70–99)
GLUCOSE BLDC GLUCOMTR-MCNC: 113 MG/DL (ref 70–99)
GLUCOSE BLDC GLUCOMTR-MCNC: 123 MG/DL (ref 70–99)
GLUCOSE BLDC GLUCOMTR-MCNC: 89 MG/DL (ref 70–99)
GLUCOSE BLDC GLUCOMTR-MCNC: 93 MG/DL (ref 70–99)
GLUCOSE UR-MCNC: NEGATIVE MG/DL
HBA1C MFR BLD: 5.5 % (ref ?–5.7)
HCT VFR BLD AUTO: 40.6 %
HDLC SERPL-MCNC: 75 MG/DL (ref 40–59)
HGB BLD-MCNC: 13.5 G/DL
INR BLD: 1 (ref 0.85–1.16)
KETONES UR-MCNC: NEGATIVE MG/DL
LDLC SERPL CALC-MCNC: 105 MG/DL (ref ?–100)
LYMPHOCYTES NFR BLD: 5.46 X10(3) UL (ref 1–4)
LYMPHOCYTES NFR BLD: 51 %
MCH RBC QN AUTO: 29.2 PG (ref 26–34)
MCHC RBC AUTO-ENTMCNC: 33.3 G/DL (ref 31–37)
MCV RBC AUTO: 87.7 FL
MONOCYTES # BLD: 0.75 X10(3) UL (ref 0.1–1)
MONOCYTES NFR BLD: 7 %
MORPHOLOGY: NORMAL
NEUTROPHILS # BLD AUTO: 3.95 X10 (3) UL (ref 1.5–7.7)
NEUTROPHILS NFR BLD: 42 %
NEUTS HYPERSEG # BLD: 4.49 X10(3) UL (ref 1.5–7.7)
NITRITE UR QL STRIP.AUTO: POSITIVE
NONHDLC SERPL-MCNC: 121 MG/DL (ref ?–130)
OSMOLALITY SERPL CALC.SUM OF ELEC: 293 MOSM/KG (ref 275–295)
P AXIS: 55 DEGREES
P-R INTERVAL: 160 MS
PH UR: 5.5 [PH] (ref 5–8)
PLATELET # BLD AUTO: 337 10(3)UL (ref 150–450)
PLATELET MORPHOLOGY: NORMAL
POTASSIUM SERPL-SCNC: 3.2 MMOL/L (ref 3.5–5.1)
POTASSIUM SERPL-SCNC: 4.1 MMOL/L (ref 3.5–5.1)
PROT UR-MCNC: NEGATIVE MG/DL
PROTHROMBIN TIME: 13.1 SECONDS (ref 11.6–14.8)
Q-T INTERVAL: 344 MS
QRS DURATION: 94 MS
QTC CALCULATION (BEZET): 475 MS
R AXIS: 42 DEGREES
RBC # BLD AUTO: 4.63 X10(6)UL
SARS-COV-2 RNA RESP QL NAA+PROBE: NOT DETECTED
SODIUM SERPL-SCNC: 140 MMOL/L (ref 136–145)
SP GR UR STRIP: 1.01 (ref 1–1.03)
T AXIS: 43 DEGREES
TOTAL CELLS COUNTED BLD: 100
TRIGL SERPL-MCNC: 88 MG/DL (ref 30–149)
TROPONIN I HIGH SENSITIVITY: 5 NG/L
TSI SER-ACNC: 1.39 MIU/ML (ref 0.36–3.74)
UROBILINOGEN UR STRIP-ACNC: 0.2
VENTRICULAR RATE: 115 BPM
VIT B12 SERPL-MCNC: 806 PG/ML (ref 193–986)
VLDLC SERPL CALC-MCNC: 15 MG/DL (ref 0–30)
WBC # BLD AUTO: 10.7 X10(3) UL (ref 4–11)

## 2023-01-17 PROCEDURE — 99223 1ST HOSP IP/OBS HIGH 75: CPT | Performed by: OTHER

## 2023-01-17 PROCEDURE — 70498 CT ANGIOGRAPHY NECK: CPT | Performed by: EMERGENCY MEDICINE

## 2023-01-17 PROCEDURE — 99222 1ST HOSP IP/OBS MODERATE 55: CPT | Performed by: HOSPITALIST

## 2023-01-17 PROCEDURE — 93306 TTE W/DOPPLER COMPLETE: CPT | Performed by: OTHER

## 2023-01-17 PROCEDURE — 70551 MRI BRAIN STEM W/O DYE: CPT | Performed by: OTHER

## 2023-01-17 PROCEDURE — 70496 CT ANGIOGRAPHY HEAD: CPT | Performed by: EMERGENCY MEDICINE

## 2023-01-17 RX ORDER — ACETAMINOPHEN 325 MG/1
650 TABLET ORAL EVERY 4 HOURS PRN
Status: DISCONTINUED | OUTPATIENT
Start: 2023-01-17 | End: 2023-01-19

## 2023-01-17 RX ORDER — CLOPIDOGREL BISULFATE 75 MG/1
75 TABLET ORAL ONCE
Status: COMPLETED | OUTPATIENT
Start: 2023-01-17 | End: 2023-01-17

## 2023-01-17 RX ORDER — HEPARIN SODIUM 5000 [USP'U]/ML
5000 INJECTION, SOLUTION INTRAVENOUS; SUBCUTANEOUS EVERY 8 HOURS SCHEDULED
Status: DISCONTINUED | OUTPATIENT
Start: 2023-01-17 | End: 2023-01-19

## 2023-01-17 RX ORDER — ALPRAZOLAM 0.5 MG/1
0.5 TABLET ORAL ONCE
Status: COMPLETED | OUTPATIENT
Start: 2023-01-17 | End: 2023-01-17

## 2023-01-17 RX ORDER — LABETALOL HYDROCHLORIDE 5 MG/ML
10 INJECTION, SOLUTION INTRAVENOUS EVERY 10 MIN PRN
Status: DISCONTINUED | OUTPATIENT
Start: 2023-01-17 | End: 2023-01-17

## 2023-01-17 RX ORDER — ACETAMINOPHEN 650 MG/1
650 SUPPOSITORY RECTAL EVERY 4 HOURS PRN
Status: DISCONTINUED | OUTPATIENT
Start: 2023-01-17 | End: 2023-01-19

## 2023-01-17 RX ORDER — POTASSIUM CHLORIDE 20 MEQ/1
40 TABLET, EXTENDED RELEASE ORAL EVERY 4 HOURS
Status: COMPLETED | OUTPATIENT
Start: 2023-01-17 | End: 2023-01-17

## 2023-01-17 RX ORDER — PROCHLORPERAZINE EDISYLATE 5 MG/ML
5 INJECTION INTRAMUSCULAR; INTRAVENOUS EVERY 8 HOURS PRN
Status: DISCONTINUED | OUTPATIENT
Start: 2023-01-17 | End: 2023-01-19

## 2023-01-17 RX ORDER — SODIUM CHLORIDE 9 MG/ML
INJECTION, SOLUTION INTRAVENOUS CONTINUOUS
Status: ACTIVE | OUTPATIENT
Start: 2023-01-17 | End: 2023-01-19

## 2023-01-17 RX ORDER — HYDRALAZINE HYDROCHLORIDE 20 MG/ML
10 INJECTION INTRAMUSCULAR; INTRAVENOUS EVERY 2 HOUR PRN
Status: DISCONTINUED | OUTPATIENT
Start: 2023-01-17 | End: 2023-01-19

## 2023-01-17 RX ORDER — ASPIRIN 300 MG/1
300 SUPPOSITORY RECTAL DAILY
Status: DISCONTINUED | OUTPATIENT
Start: 2023-01-17 | End: 2023-01-17

## 2023-01-17 RX ORDER — ASPIRIN 81 MG/1
324 TABLET, CHEWABLE ORAL ONCE
Status: COMPLETED | OUTPATIENT
Start: 2023-01-17 | End: 2023-01-17

## 2023-01-17 RX ORDER — ONDANSETRON 2 MG/ML
4 INJECTION INTRAMUSCULAR; INTRAVENOUS EVERY 6 HOURS PRN
Status: DISCONTINUED | OUTPATIENT
Start: 2023-01-17 | End: 2023-01-19

## 2023-01-17 RX ORDER — ATORVASTATIN CALCIUM 80 MG/1
80 TABLET, FILM COATED ORAL NIGHTLY
Status: DISCONTINUED | OUTPATIENT
Start: 2023-01-17 | End: 2023-01-17

## 2023-01-17 RX ORDER — ASPIRIN 325 MG
325 TABLET ORAL DAILY
Status: DISCONTINUED | OUTPATIENT
Start: 2023-01-17 | End: 2023-01-17

## 2023-01-17 NOTE — ED QUICK NOTES
Orders for admission, patient is aware of plan and ready to go upstairs. Any questions, please call ED RN Vadim Dolan at extension [phone].      Patient Covid vaccination status: Unvaccinated     COVID Test Ordered in ED: Rapid SARS-CoV-2 by PCR    COVID Suspicion at Admission: N/A    Running Infusions:  None    Mental Status/LOC at time of transport: AAOx3    Other pertinent information:   CIWA score: N/A   NIH score:  2

## 2023-01-17 NOTE — PROGRESS NOTES
01/17/23 0120   Clinical Encounter Type   Visited With Health care provider;Patient not available   Crisis Visit ED  (Stroke Alert)   Referral To    Taxonomy   Intended Effects Convey a calming presence   Interventions Silent prayer       Discussion:  responded to Stroke Alert. When  arrived to ED, patient had just arrived and was promptly occupied with her care team. Patient was taken to CT.  spoke with patient's nurse and made her aware of the 's availability and requested that she reach out to a  if the patient and/or her loved ones desired or would benefit from future  support. Chaplains are available for a follow-up visit PRN and can be reached at [phone]. Rupert Hinton M.Div, Chaplain Resident.

## 2023-01-17 NOTE — ED INITIAL ASSESSMENT (HPI)
Woke up feeling out of sorts.  Last known normal at 1045pm. Woke up 20 minutes ago. + dizzy, slurred speech

## 2023-01-17 NOTE — CM/SW NOTE
01/17/23 1200   CM/SW Referral Data   Referral Source Physician   Reason for Referral Protocol order set   Specify order set Stroke  (TIA w/up; [facility] )   Informant Patient   Patient [address]   Number of Levels in Home 3   Number of Stair in Home 8   Patient lives with Alone   Patient Status Prior to Admission   Independent with ADLs and Mobility Yes   Discharge Needs   Anticipated D/C needs No anticipated discharge needs     Received protocol MDO for [facility] evaluation due to TIA/Stroke w/up. SW met w/ pt and pt's sister Ciarra Herrmann in her room. Above assessment completed. Pt confirmed living alone and being completely independent at baseline. Pt does not use any assistive devices for ambulation. Pt still works FT as a . Pt still drives, cooks, and cleans. Pt has no hx w/ HH or Outpatient PT. SW briefly discussed HH - pt feels services are not needed at this time. PLAN: Home, no needs - pending med clear      SW/CM to remain available for support and/or discharge planning.        Ayana Cox, MSW, [facility]

## 2023-01-17 NOTE — ED QUICK NOTES
Patient stating she feels better. Pt up and ambulatory to the restroom with this writer at stand. Pt speaking in full sentences with no difficulty noted and no slurred speech noted at this time. Pt AAOx4.

## 2023-01-17 NOTE — PLAN OF CARE
Problem: CARDIOVASCULAR - ADULT  Goal: Maintains optimal cardiac output and hemodynamic stability  Description: INTERVENTIONS:  - Monitor vital signs, rhythm, and trends  - Monitor for bleeding, hypotension and signs of decreased cardiac output  - Evaluate effectiveness of vasoactive medications to optimize hemodynamic stability  - Assess quality of pulses, skin color and temperature  - Assess for signs of decreased coronary artery perfusion - ex. Angina  - Evaluate fluid balance, assess for edema, trend weights  Outcome: Progressing  Goal: Absence of cardiac arrhythmias or at baseline  Description: INTERVENTIONS:  - Continuous cardiac monitoring, monitor vital signs, obtain 12 lead EKG if indicated  - Evaluate effectiveness of antiarrhythmic and heart rate control medications as ordered  - Initiate emergency measures for life threatening arrhythmias  - Monitor electrolytes and administer replacement therapy as ordered  Outcome: Progressing     Problem: NEUROLOGICAL - ADULT  Goal: Achieves stable or improved neurological status  Description: INTERVENTIONS  - Assess for and report changes in neurological status  - Initiate measures to prevent increased intracranial pressure  - Maintain blood pressure and fluid volume within ordered parameters to optimize cerebral perfusion and minimize risk of hemorrhage  - Monitor temperature, glucose, and sodium. Initiate appropriate interventions as ordered  Outcome: Progressing    Received awake,oriented. Tylenol given for headache. MRI brain done. IVF started. Plans for PT/OT/ST evaluation. Neuro checks q2 hr.Call light within  reach. Instructed to call for assistance. Will continue to monitor patient.

## 2023-01-18 PROBLEM — R29.818 TRANSIENT NEUROLOGICAL SYMPTOMS: Status: ACTIVE | Noted: 2023-01-18

## 2023-01-18 LAB — T PALLIDUM AB SER QL: NEGATIVE

## 2023-01-18 PROCEDURE — 99232 SBSQ HOSP IP/OBS MODERATE 35: CPT | Performed by: HOSPITALIST

## 2023-01-18 PROCEDURE — 95816 EEG AWAKE AND DROWSY: CPT | Performed by: OTHER

## 2023-01-18 PROCEDURE — 99232 SBSQ HOSP IP/OBS MODERATE 35: CPT | Performed by: OTHER

## 2023-01-18 NOTE — PLAN OF CARE
Problem: Patient Centered Care  Goal: Patient preferences are identified and integrated in the patient's plan of care  Description: Interventions:  - What would you like us to know as we care for you?   - Provide timely, complete, and accurate information to patient/family  - Incorporate patient and family knowledge, values, beliefs, and cultural backgrounds into the planning and delivery of care  - Encourage patient/family to participate in care and decision-making at the level they choose  - Honor patient and family perspectives and choices  Outcome: Progressing     Problem: Patient/Family Goals  Goal: Patient/Family Long Term Goal  Description: Patient's Long Term Goal: To back to normal activities    Interventions:  - Neuro checks  - MRI  - PT/OT/ST evaluation    - See additional Care Plan goals for specific interventions  Outcome: Progressing  Goal: Patient/Family Short Term Goal  Description: Patient's Short Term Goal: To get better    Interventions:   - Vital signs monitoring  - Neuro checks  - MRI brain  - PT/OT/ST evaluation    - See additional Care Plan goals for specific interventions  Outcome: Progressing     Problem: CARDIOVASCULAR - ADULT  Goal: Maintains optimal cardiac output and hemodynamic stability  Description: INTERVENTIONS:  - Monitor vital signs, rhythm, and trends  - Monitor for bleeding, hypotension and signs of decreased cardiac output  - Evaluate effectiveness of vasoactive medications to optimize hemodynamic stability  - Monitor arterial and/or venous puncture sites for bleeding and/or hematoma  - Assess quality of pulses, skin color and temperature  - Assess for signs of decreased coronary artery perfusion - ex.  Angina  - Evaluate fluid balance, assess for edema, trend weights  Outcome: Progressing  Goal: Absence of cardiac arrhythmias or at baseline  Description: INTERVENTIONS:  - Continuous cardiac monitoring, monitor vital signs, obtain 12 lead EKG if indicated  - Evaluate effectiveness of antiarrhythmic and heart rate control medications as ordered  - Initiate emergency measures for life threatening arrhythmias  - Monitor electrolytes and administer replacement therapy as ordered  Outcome: Progressing     Problem: NEUROLOGICAL - ADULT  Goal: Achieves stable or improved neurological status  Description: INTERVENTIONS  - Assess for and report changes in neurological status  - Initiate measures to prevent increased intracranial pressure  - Maintain blood pressure and fluid volume within ordered parameters to optimize cerebral perfusion and minimize risk of hemorrhage  - Monitor temperature, glucose, and sodium. Initiate appropriate interventions as ordered  Outcome: Progressing   IV fluids infusing. MRI and Echo done. Sister at bedside.

## 2023-01-18 NOTE — PLAN OF CARE
Neuro Q4 hours, CPAP overnight, continuous pulse O2 overnight, plan for EEG in the am. Will discharge home when medically clear. Problem: Patient Centered Care  Goal: Patient preferences are identified and integrated in the patient's plan of care  Description: Interventions:  - What would you like us to know as we care for you?   - Provide timely, complete, and accurate information to patient/family  - Incorporate patient and family knowledge, values, beliefs, and cultural backgrounds into the planning and delivery of care  - Encourage patient/family to participate in care and decision-making at the level they choose  - Honor patient and family perspectives and choices  Outcome: Progressing     Problem: Patient/Family Goals  Goal: Patient/Family Long Term Goal  Description: Patient's Long Term Goal: To back to normal activities    Interventions:  - Neuro checks  - MRI  - PT/OT/ST evaluation    - See additional Care Plan goals for specific interventions  1/18/2023 0610 by Yaneli Michaels RN  Outcome: Progressing  1/18/2023 0406 by Yaneli Michaels RN  Outcome: Progressing  Goal: Patient/Family Short Term Goal  Description: Patient's Short Term Goal: To get better    Interventions:   - Vital signs monitoring  - Neuro checks  - MRI brain  - PT/OT/ST evaluation    - See additional Care Plan goals for specific interventions  Outcome: Progressing     Problem: CARDIOVASCULAR - ADULT  Goal: Maintains optimal cardiac output and hemodynamic stability  Description: INTERVENTIONS:  - Monitor vital signs, rhythm, and trends  - Monitor for bleeding, hypotension and signs of decreased cardiac output  - Evaluate effectiveness of vasoactive medications to optimize hemodynamic stability  - Monitor arterial and/or venous puncture sites for bleeding and/or hematoma  - Assess quality of pulses, skin color and temperature  - Assess for signs of decreased coronary artery perfusion - ex.  Angina  - Evaluate fluid balance, assess for edema, trend weights  Outcome: Progressing  Goal: Absence of cardiac arrhythmias or at baseline  Description: INTERVENTIONS:  - Continuous cardiac monitoring, monitor vital signs, obtain 12 lead EKG if indicated  - Evaluate effectiveness of antiarrhythmic and heart rate control medications as ordered  - Initiate emergency measures for life threatening arrhythmias  - Monitor electrolytes and administer replacement therapy as ordered  Outcome: Progressing     Problem: NEUROLOGICAL - ADULT  Goal: Achieves stable or improved neurological status  Description: INTERVENTIONS  - Assess for and report changes in neurological status  - Initiate measures to prevent increased intracranial pressure  - Maintain blood pressure and fluid volume within ordered parameters to optimize cerebral perfusion and minimize risk of hemorrhage  - Monitor temperature, glucose, and sodium.  Initiate appropriate interventions as ordered  Outcome: Progressing

## 2023-01-18 NOTE — PLAN OF CARE
Problem: Patient Centered Care  Goal: Patient preferences are identified and integrated in the patient's plan of care  Description: Interventions:  - What would you like us to know as we care for you?   - Provide timely, complete, and accurate information to patient/family  - Incorporate patient and family knowledge, values, beliefs, and cultural backgrounds into the planning and delivery of care  - Encourage patient/family to participate in care and decision-making at the level they choose  - Honor patient and family perspectives and choices  Outcome: Progressing     Problem: Patient/Family Goals  Goal: Patient/Family Long Term Goal  Description: Patient's Long Term Goal: To back to normal activities    Interventions:  - Neuro checks  - MRI  - PT/OT/ST evaluation    - See additional Care Plan goals for specific interventions  Outcome: Progressing  Goal: Patient/Family Short Term Goal  Description: Patient's Short Term Goal: To get better    Interventions:   - Vital signs monitoring  - Neuro checks  - MRI brain  - PT/OT/ST evaluation    - See additional Care Plan goals for specific interventions  Outcome: Progressing     Problem: CARDIOVASCULAR - ADULT  Goal: Maintains optimal cardiac output and hemodynamic stability  Description: INTERVENTIONS:  - Monitor vital signs, rhythm, and trends  - Monitor for bleeding, hypotension and signs of decreased cardiac output  - Evaluate effectiveness of vasoactive medications to optimize hemodynamic stability  - Monitor arterial and/or venous puncture sites for bleeding and/or hematoma  - Assess quality of pulses, skin color and temperature  - Assess for signs of decreased coronary artery perfusion - ex.  Angina  - Evaluate fluid balance, assess for edema, trend weights  Outcome: Progressing  Goal: Absence of cardiac arrhythmias or at baseline  Description: INTERVENTIONS:  - Continuous cardiac monitoring, monitor vital signs, obtain 12 lead EKG if indicated  - Evaluate effectiveness of antiarrhythmic and heart rate control medications as ordered  - Initiate emergency measures for life threatening arrhythmias  - Monitor electrolytes and administer replacement therapy as ordered  Outcome: Progressing     Problem: NEUROLOGICAL - ADULT  Goal: Achieves stable or improved neurological status  Description: INTERVENTIONS  - Assess for and report changes in neurological status  - Initiate measures to prevent increased intracranial pressure  - Maintain blood pressure and fluid volume within ordered parameters to optimize cerebral perfusion and minimize risk of hemorrhage  - Monitor temperature, glucose, and sodium. Initiate appropriate interventions as ordered  Outcome: Progressing   Ambulating to bathroom. IV antibiotics. Waiting for urine culture results.

## 2023-01-18 NOTE — SLP NOTE
SPEECH DAILY NOTE - INPATIENT    ASSESSMENT & PLAN   ASSESSMENT  PPE REQUIRED. THIS THERAPIST WORE GLOVES AND MASK FOR DURATION OF EVALUATION. HANDS WASHED UPON ENTRANCE/EXIT. SLP f/u for ongoing meal assessment per recommendations of regular/thin liquids per BSE. RN reports pt tolerates diet and medication well with no overt clinical s/s aspiration. Pt denies any swallowing challenges. Pt positioned upright in bedside chair, alert/cooperative. Pt afebrile, tolerating room air with oxygen status 96% prior to the start of oral trials. SLP reviewed aspiration precautions and safe swallowing compensatory strategies with the patient. Safe swallow guidelines remain written on the white board in purple. Patient v/u. Provided no assistance, pt tolerates regular consistency and thin liquids via straw with no overt clinical signs/symptoms of aspiration. Oxygen status remained stable t/o the entire session. Current diet remains appropriate. No further speech services warranted at this time. Please re consult if needed. RN alerted with results and recommendations. MOST RECENT CXR -none on this admission       Diet Recommendations - Solids: Regular  Diet Recommendations - Liquids: Thin Liquids    Compensatory Strategies Recommended: Slow rate; Alternate consistencies;Small bites and sips  Aspiration Precautions: Upright position; Slow rate;Small bites and sips  Medication Administration Recommendations: No restrictions    Patient Experiencing Pain: No                Discharge Recommendations  Discharge Recommendations/Plan: Undetermined    Treatment Plan  Treatment Plan/Recommendations: Aspiration precautions    Interdisciplinary Communication: Discussed with RN          GOALS  Goal #1 The patient will tolerate regular consistency and thin liquids without overt signs or symptoms of aspiration with 100 % accuracy over 1 session(s).   Goal Met   Goal #2 The patient/family/caregiver will demonstrate understanding and implementation of aspiration precautions and swallow strategies independently over 1 session(s).     Goal Met     FOLLOW UP  Follow Up Needed (Documentation Required): No  SLP Follow-up Date: 01/18/23  Number of Visits to Meet Established Goals: 1    Session: 1    If you have any questions, please contact Ronal Clements, MICHELLE [place] Pathologist  Phone Number Ext. [phone]

## 2023-01-18 NOTE — DISCHARGE INSTRUCTIONS
Call to schedule the sleep study  Call to schedule audiology appointment  Follow-up in neurology clinic in 2 to 3 months. It is not clear what caused your symptoms. It is unlikely that it was due to a stroke or transient ischemic attack (TIA). B.E. F.A.S.T.    B: Balance-- sudden loss  of balance, staggering gait, severe vertigo        E: Eyes-- sudden loss of vision in one or both eyes, onset of double vision        F: Face-- uneven or drooping face, drooling, ask the patient to smile        A: Arm (leg)-- loss of strength or sensation on one side of the body in the arm and/or leg        S: Speech-- slurring of speech, difficulty  saying words or understanding what is being said, sudden confusion        T: Terrible headache (time*)-- very severe headache which has maximum intensity within seconds to a minute        * Time of symptom onset and last known well times are important when determining what treatment is appropriate for an individual's stroke, particularly since treatment is time limited. Time is not a symptom or sign of stroke. Traditionally, Time was used for the \"T\" in the FAST and BEFAST acronyms for stroke awareness. Since terrible headache can be a symptom of stroke this is substituted. However, as the acronym B.E. F.A.S.T. suggests, acting quickly is of critical importance after stroke is suspected. Knowing the symptom onset time or time when last well will have an impact on what treatments can be offered safely.

## 2023-01-19 VITALS
HEIGHT: 67 IN | WEIGHT: 163.63 LBS | TEMPERATURE: 98 F | BODY MASS INDEX: 25.68 KG/M2 | SYSTOLIC BLOOD PRESSURE: 116 MMHG | OXYGEN SATURATION: 93 % | DIASTOLIC BLOOD PRESSURE: 73 MMHG | RESPIRATION RATE: 18 BRPM | HEART RATE: 73 BPM

## 2023-01-19 PROCEDURE — 99239 HOSP IP/OBS DSCHRG MGMT >30: CPT | Performed by: HOSPITALIST

## 2023-01-19 RX ORDER — CIPROFLOXACIN 250 MG/1
250 TABLET, FILM COATED ORAL 2 TIMES DAILY
Qty: 20 TABLET | Refills: 0 | Status: SHIPPED | OUTPATIENT
Start: 2023-01-19 | End: 2023-01-29

## 2023-01-19 NOTE — PLAN OF CARE
Patient cleared for DC. DC instructions discussed at the bedside. All questions addressed and answered. Tele and IV removed. All belongings sent with patient. Problem: Patient Centered Care  Goal: Patient preferences are identified and integrated in the patient's plan of care  Description: Interventions:  - What would you like us to know as we care for you?  - Provide timely, complete, and accurate information to patient/family  - Incorporate patient and family knowledge, values, beliefs, and cultural backgrounds into the planning and delivery of care  - Encourage patient/family to participate in care and decision-making at the level they choose  - Honor patient and family perspectives and choices  Outcome: Adequate for Discharge     Problem: Patient/Family Goals  Goal: Patient/Family Long Term Goal  Description: Patient's Long Term Goal: To back to normal activities    Interventions:  - Neuro checks  - MRI  - PT/OT/ST evaluation    - See additional Care Plan goals for specific interventions  Outcome: Adequate for Discharge  Goal: Patient/Family Short Term Goal  Description: Patient's Short Term Goal: To get better    Interventions:   - Vital signs monitoring  - Neuro checks  - MRI brain  - PT/OT/ST evaluation    - See additional Care Plan goals for specific interventions  Outcome: Adequate for Discharge     Problem: CARDIOVASCULAR - ADULT  Goal: Maintains optimal cardiac output and hemodynamic stability  Description: INTERVENTIONS:  - Monitor vital signs, rhythm, and trends  - Monitor for bleeding, hypotension and signs of decreased cardiac output  - Evaluate effectiveness of vasoactive medications to optimize hemodynamic stability  - Monitor arterial and/or venous puncture sites for bleeding and/or hematoma  - Assess quality of pulses, skin color and temperature  - Assess for signs of decreased coronary artery perfusion - ex.  Angina  - Evaluate fluid balance, assess for edema, trend weights  Outcome: Adequate for Discharge  Goal: Absence of cardiac arrhythmias or at baseline  Description: INTERVENTIONS:  - Continuous cardiac monitoring, monitor vital signs, obtain 12 lead EKG if indicated  - Evaluate effectiveness of antiarrhythmic and heart rate control medications as ordered  - Initiate emergency measures for life threatening arrhythmias  - Monitor electrolytes and administer replacement therapy as ordered  Outcome: Adequate for Discharge     Problem: NEUROLOGICAL - ADULT  Goal: Achieves stable or improved neurological status  Description: INTERVENTIONS  - Assess for and report changes in neurological status  - Initiate measures to prevent increased intracranial pressure  - Maintain blood pressure and fluid volume within ordered parameters to optimize cerebral perfusion and minimize risk of hemorrhage  - Monitor temperature, glucose, and sodium.  Initiate appropriate interventions as ordered  Outcome: Adequate for Discharge

## 2023-01-19 NOTE — PLAN OF CARE
Problem: CARDIOVASCULAR - ADULT  Goal: Maintains optimal cardiac output and hemodynamic stability  Description: INTERVENTIONS:  - Monitor vital signs, rhythm, and trends  - Monitor for bleeding, hypotension and signs of decreased cardiac output  - Evaluate effectiveness of vasoactive medications to optimize hemodynamic stability  - Monitor arterial and/or venous puncture sites for bleeding and/or hematoma  - Assess quality of pulses, skin color and temperature  - Assess for signs of decreased coronary artery perfusion - ex. Angina  - Evaluate fluid balance, assess for edema, trend weights  Outcome: Progressing  Goal: Absence of cardiac arrhythmias or at baseline  Description: INTERVENTIONS:  - Continuous cardiac monitoring, monitor vital signs, obtain 12 lead EKG if indicated  - Evaluate effectiveness of antiarrhythmic and heart rate control medications as ordered  - Initiate emergency measures for life threatening arrhythmias  - Monitor electrolytes and administer replacement therapy as ordered  Outcome: Progressing     Problem: NEUROLOGICAL - ADULT  Goal: Achieves stable or improved neurological status  Description: INTERVENTIONS  - Assess for and report changes in neurological status  - Initiate measures to prevent increased intracranial pressure  - Maintain blood pressure and fluid volume within ordered parameters to optimize cerebral perfusion and minimize risk of hemorrhage  - Monitor temperature, glucose, and sodium.  Initiate appropriate interventions as ordered  Outcome: Progressing

## 2023-01-20 ENCOUNTER — PATIENT OUTREACH (OUTPATIENT)
Dept: CASE MANAGEMENT | Age: 63
End: 2023-01-20

## 2023-01-20 ENCOUNTER — TELEPHONE (OUTPATIENT)
Dept: INTERNAL MEDICINE CLINIC | Facility: CLINIC | Age: 63
End: 2023-01-20

## 2023-01-20 DIAGNOSIS — Z02.9 ENCOUNTERS FOR UNSPECIFIED ADMINISTRATIVE PURPOSE: ICD-10-CM

## 2023-01-20 NOTE — TELEPHONE ENCOUNTER
Per note from RN below,     \"The patient is requesting an alternative antibiotic due to the patient's current headache. \"      Please advise if rx change appropriate before I call patient with recommendations.

## 2023-01-20 NOTE — TELEPHONE ENCOUNTER
Spoke to patient and relayed Dr. Scott Push message below. Patient verbalized understanding. She said that she never gets headaches and doesn't think she could work with this headache. She would like to switch antibiotics.     Dr. Tanya Garrison- Please send new Rx to [facility] in [place].

## 2023-01-20 NOTE — TELEPHONE ENCOUNTER
Agree with advice if any dizziness lightheadedness blurry vision slurring of the speech she needs to go to ER can take Tylenol as needed make sure she drinks a lot of fluids,

## 2023-01-20 NOTE — TELEPHONE ENCOUNTER
I do not think her headache is due to ciprofloxacin. If it is a reaction to the medication should have other side effects as well?   With if she wants to change to different let me know

## 2023-01-20 NOTE — TELEPHONE ENCOUNTER
Spoke to patient and relayed Dr. Hernandez Patino message below. Patient verbalized understanding and had no further questions.

## 2023-01-23 LAB — VITAMIN B1 (THIAMINE), WHOLE B: 95 NMOL/L

## 2023-01-25 ENCOUNTER — TELEPHONE (OUTPATIENT)
Dept: INTERNAL MEDICINE CLINIC | Facility: CLINIC | Age: 63
End: 2023-01-25

## 2023-01-25 NOTE — TELEPHONE ENCOUNTER
Patient with sister on the line asking for appointment    Stated patient has lots of eva fog and was found to be sleeping at work. Patient is alert and orient stated she is open to appointment    Stated does have hospital follow up already schedule, sister is asking for sooner appointment.     Future Appointments   Date Time Provider Alyssa Maddison   1/26/2023 12:00 PM Shar Walker MD [facility]   1/26/2023  9:15  [facility] SLEEP ROOMS [address] [facility] SLEEP EM Sleep Ctr   1/30/2023  4:30 PM Shar Walker MD [id number]   2/2/2023 11:40 AM [facility] EM [place]   2/7/2023  3:30 PM GI COLON SCREENING [address] None   3/22/2023 10:00 AM [facility]

## 2023-01-26 ENCOUNTER — TELEPHONE (OUTPATIENT)
Dept: INTERNAL MEDICINE CLINIC | Facility: CLINIC | Age: 63
End: 2023-01-26

## 2023-01-26 ENCOUNTER — OFFICE VISIT (OUTPATIENT)
Dept: SLEEP CENTER | Age: 63
End: 2023-01-26
Attending: Other
Payer: COMMERCIAL

## 2023-01-26 ENCOUNTER — OFFICE VISIT (OUTPATIENT)
Dept: INTERNAL MEDICINE CLINIC | Facility: CLINIC | Age: 63
End: 2023-01-26

## 2023-01-26 VITALS
HEIGHT: 60 IN | WEIGHT: 167 LBS | DIASTOLIC BLOOD PRESSURE: 72 MMHG | HEART RATE: 75 BPM | SYSTOLIC BLOOD PRESSURE: 127 MMHG | BODY MASS INDEX: 32.79 KG/M2 | TEMPERATURE: 98 F | OXYGEN SATURATION: 100 %

## 2023-01-26 DIAGNOSIS — G47.33 OSA (OBSTRUCTIVE SLEEP APNEA): ICD-10-CM

## 2023-01-26 DIAGNOSIS — R79.89 ELEVATED LFTS: Primary | ICD-10-CM

## 2023-01-26 DIAGNOSIS — R47.1 DYSARTHRIA: ICD-10-CM

## 2023-01-26 DIAGNOSIS — R41.0 DISORIENTATION: ICD-10-CM

## 2023-01-26 DIAGNOSIS — Z12.11 SCREENING FOR COLON CANCER: ICD-10-CM

## 2023-01-26 DIAGNOSIS — R29.818 TRANSIENT NEUROLOGICAL SYMPTOMS: ICD-10-CM

## 2023-01-26 PROCEDURE — 3078F DIAST BP <80 MM HG: CPT | Performed by: INTERNAL MEDICINE

## 2023-01-26 PROCEDURE — 3008F BODY MASS INDEX DOCD: CPT | Performed by: INTERNAL MEDICINE

## 2023-01-26 PROCEDURE — 3074F SYST BP LT 130 MM HG: CPT | Performed by: INTERNAL MEDICINE

## 2023-01-26 PROCEDURE — 99214 OFFICE O/P EST MOD 30 MIN: CPT | Performed by: INTERNAL MEDICINE

## 2023-01-26 PROCEDURE — 95810 POLYSOM 6/> YRS 4/> PARAM: CPT

## 2023-01-26 NOTE — TELEPHONE ENCOUNTER
Patients son Marsha Simmons is calling and states that he spoke the his moms neurologist and they couldn't get her an appt in the date range that they were given and he was directed to call and see if you can call and try and get an appt for her earlier. He also said that they told him a referral is needed per her insurance for .

## 2023-01-27 ENCOUNTER — TELEPHONE (OUTPATIENT)
Dept: INTERNAL MEDICINE CLINIC | Facility: CLINIC | Age: 63
End: 2023-01-27

## 2023-01-27 ENCOUNTER — LAB ENCOUNTER (OUTPATIENT)
Dept: LAB | Facility: HOSPITAL | Age: 63
End: 2023-01-27
Attending: INTERNAL MEDICINE
Payer: COMMERCIAL

## 2023-01-27 DIAGNOSIS — R79.89 ELEVATED LFTS: ICD-10-CM

## 2023-01-27 LAB
ALBUMIN SERPL-MCNC: 3.6 G/DL (ref 3.4–5)
ALBUMIN/GLOB SERPL: 1 {RATIO} (ref 1–2)
ALP LIVER SERPL-CCNC: 60 U/L
ALT SERPL-CCNC: 40 U/L
ANION GAP SERPL CALC-SCNC: 7 MMOL/L (ref 0–18)
AST SERPL-CCNC: 11 U/L (ref 15–37)
BILIRUB SERPL-MCNC: 0.5 MG/DL (ref 0.1–2)
BUN BLD-MCNC: 14 MG/DL (ref 7–18)
BUN/CREAT SERPL: 16.3 (ref 10–20)
CALCIUM BLD-MCNC: 9.2 MG/DL (ref 8.5–10.1)
CHLORIDE SERPL-SCNC: 106 MMOL/L (ref 98–112)
CO2 SERPL-SCNC: 29 MMOL/L (ref 21–32)
CREAT BLD-MCNC: 0.86 MG/DL
FASTING STATUS PATIENT QL REPORTED: YES
GFR SERPLBLD BASED ON 1.73 SQ M-ARVRAT: 76 ML/MIN/1.73M2 (ref 60–?)
GLOBULIN PLAS-MCNC: 3.5 G/DL (ref 2.8–4.4)
GLUCOSE BLD-MCNC: 130 MG/DL (ref 70–99)
OSMOLALITY SERPL CALC.SUM OF ELEC: 296 MOSM/KG (ref 275–295)
POTASSIUM SERPL-SCNC: 4.2 MMOL/L (ref 3.5–5.1)
PROT SERPL-MCNC: 7.1 G/DL (ref 6.4–8.2)
SODIUM SERPL-SCNC: 142 MMOL/L (ref 136–145)

## 2023-01-27 PROCEDURE — 80053 COMPREHEN METABOLIC PANEL: CPT

## 2023-01-27 PROCEDURE — 36415 COLL VENOUS BLD VENIPUNCTURE: CPT

## 2023-01-27 NOTE — TELEPHONE ENCOUNTER
Pt informed that referral has been entered for Dr. Tompkins Fails, and that Dr. Raina Cintron will try to see if he can see her sooner.

## 2023-01-27 NOTE — TELEPHONE ENCOUNTER
Message left for pt that 2/15/23 is not that far away, Dr. Alanna Alston still waiting for Dr. Alexander Mckeon to call back, please keep schedules appt.

## 2023-01-27 NOTE — TELEPHONE ENCOUNTER
Sherwin Corona ,   You have seen this patient during recent hospitalization for altered mental status, CVA was ruled out, seizures were ruled out. I did see her at my office the other day according to her son her coworkers noted that she is having some behavioral changes, she falls asleep at her work that she never did before and she seems not herself  They are insisting to see you sooner then 2/15/2023. Do you have any sooner opening ? please let me know

## 2023-01-27 NOTE — TELEPHONE ENCOUNTER
[facility]  [phone]    They got patient an appt with Dr Ana Cristina Tracy Neurology on 2-15. Son wants Dr Vu Garcia to call back with a sooner appt with Dr Ana Cristina Tracy. He is very concerned about mom.

## 2023-01-27 NOTE — TELEPHONE ENCOUNTER
2/15 is not that far I do not think that he can see sooner than 2 weeks, he should keep that brodie , ill wait for  dr Audree Cheadle to answer

## 2023-01-27 NOTE — TELEPHONE ENCOUNTER
2/15 is not that far I do not think that he can see sooner than 2 weeks, he should keep that brodie , ill wait for  dr Harshil Kaur to answer

## 2023-01-27 NOTE — TELEPHONE ENCOUNTER
I spoke with her , per dr Maddison Mahmood - will wait on sleep study results,  and follow up in 2 weeks , if any changes let us know

## 2023-01-30 ENCOUNTER — ORDER TRANSCRIPTION (OUTPATIENT)
Dept: SLEEP CENTER | Age: 63
End: 2023-01-30

## 2023-01-30 DIAGNOSIS — G47.33 OSA (OBSTRUCTIVE SLEEP APNEA): Primary | ICD-10-CM

## 2023-01-30 DIAGNOSIS — G47.33 OBSTRUCTIVE SLEEP APNEA (ADULT) (PEDIATRIC): Primary | ICD-10-CM

## 2023-01-30 NOTE — TELEPHONE ENCOUNTER
Hi,  She completed her initial sleep study. She has STEVEN. She needs to schedule the CPAP titration. I ordered it. She needs to call the sleep center and follow-up with them. Any issues regarding her sleep apnea or CPAP need to be followed up with pulmonology in the future. We have no role in her obtaining her CPAP mask. That is all per pulmonology.

## 2023-02-07 ENCOUNTER — NURSE ONLY (OUTPATIENT)
Facility: CLINIC | Age: 63
End: 2023-02-07

## 2023-02-07 DIAGNOSIS — Z86.010 PERSONAL HISTORY OF COLONIC POLYPS: Primary | ICD-10-CM

## 2023-02-07 NOTE — PROGRESS NOTES
May schedule for a history of colon polyps following a split dose MiraLAX/Gatorade preparation and either IV sedation or monitored anesthesia care per scheduling. Should be scheduled after neurologic evaluation and if deemed stable from a neurology standpoint.

## 2023-02-15 ENCOUNTER — OFFICE VISIT (OUTPATIENT)
Dept: NEUROLOGY | Facility: CLINIC | Age: 63
End: 2023-02-15
Payer: COMMERCIAL

## 2023-02-15 VITALS
HEIGHT: 60 IN | BODY MASS INDEX: 32.79 KG/M2 | DIASTOLIC BLOOD PRESSURE: 70 MMHG | WEIGHT: 167 LBS | SYSTOLIC BLOOD PRESSURE: 120 MMHG

## 2023-02-15 DIAGNOSIS — G93.40 ENCEPHALOPATHY: Primary | ICD-10-CM

## 2023-02-15 PROCEDURE — 3078F DIAST BP <80 MM HG: CPT | Performed by: OTHER

## 2023-02-15 PROCEDURE — 99417 PROLNG OP E/M EACH 15 MIN: CPT | Performed by: OTHER

## 2023-02-15 PROCEDURE — 3074F SYST BP LT 130 MM HG: CPT | Performed by: OTHER

## 2023-02-15 PROCEDURE — 99215 OFFICE O/P EST HI 40 MIN: CPT | Performed by: OTHER

## 2023-02-15 PROCEDURE — 3008F BODY MASS INDEX DOCD: CPT | Performed by: OTHER

## 2023-02-16 ENCOUNTER — TELEPHONE (OUTPATIENT)
Dept: PULMONOLOGY | Facility: CLINIC | Age: 63
End: 2023-02-16

## 2023-02-16 NOTE — TELEPHONE ENCOUNTER
----- Message from Robbin Louis MD sent at 2/15/2023  1:44 PM CST -----  Regarding: FW: pt really wants an autopap  RN, reach out to this patient and you can add her on my schedule next week or the week after. Follow-up for sleep apnea new consult.    ----- Message -----  From: Renata Alvarez DO  Sent: 2/15/2023  11:20 AM CST  To: Robbin Louis MD  Subject: pt really wants an autopap                       Hi Ernie,  The pt is really worried about her cognition. She is having behavioral disturbances. She almost lost her job b/c of her inappropriate behavior. It does not all sound like sleep apnea, but can we get her an autopap?     Thanks,  [employer]

## 2023-02-17 NOTE — TELEPHONE ENCOUNTER
Spoke with patient. Appointment scheduled for 3/9 at 1:15PM. Verified date, time, place, and where to park. Patient verbalized understanding.

## 2023-02-24 ENCOUNTER — LAB ENCOUNTER (OUTPATIENT)
Dept: LAB | Age: 63
End: 2023-02-24
Attending: Other
Payer: COMMERCIAL

## 2023-02-24 DIAGNOSIS — G47.33 OBSTRUCTIVE SLEEP APNEA (ADULT) (PEDIATRIC): ICD-10-CM

## 2023-02-25 LAB — SARS-COV-2 RNA RESP QL NAA+PROBE: NOT DETECTED

## 2023-02-27 ENCOUNTER — OFFICE VISIT (OUTPATIENT)
Dept: SLEEP CENTER | Age: 63
End: 2023-02-27
Attending: Other
Payer: COMMERCIAL

## 2023-02-27 DIAGNOSIS — G47.33 OSA (OBSTRUCTIVE SLEEP APNEA): ICD-10-CM

## 2023-02-27 PROCEDURE — 95811 POLYSOM 6/>YRS CPAP 4/> PARM: CPT

## 2023-03-04 ENCOUNTER — HOSPITAL ENCOUNTER (OUTPATIENT)
Dept: MAMMOGRAPHY | Facility: HOSPITAL | Age: 63
Discharge: HOME | End: 2023-03-04
Attending: INTERNAL MEDICINE
Payer: COMMERCIAL

## 2023-03-04 DIAGNOSIS — Z12.31 ENCOUNTER FOR SCREENING MAMMOGRAM FOR MALIGNANT NEOPLASM OF BREAST: ICD-10-CM

## 2023-03-04 PROCEDURE — 77067 SCR MAMMO BI INCL CAD: CPT | Performed by: INTERNAL MEDICINE

## 2023-03-04 PROCEDURE — 77063 BREAST TOMOSYNTHESIS BI: CPT | Performed by: INTERNAL MEDICINE

## 2023-03-09 ENCOUNTER — TELEMEDICINE (OUTPATIENT)
Dept: PULMONOLOGY | Facility: CLINIC | Age: 63
End: 2023-03-09

## 2023-03-09 ENCOUNTER — TELEMEDICINE (OUTPATIENT)
Dept: INTERNAL MEDICINE CLINIC | Facility: CLINIC | Age: 63
End: 2023-03-09

## 2023-03-09 ENCOUNTER — NURSE TRIAGE (OUTPATIENT)
Facility: CLINIC | Age: 63
End: 2023-03-09

## 2023-03-09 ENCOUNTER — TELEPHONE (OUTPATIENT)
Dept: PULMONOLOGY | Facility: CLINIC | Age: 63
End: 2023-03-09

## 2023-03-09 DIAGNOSIS — U07.1 COVID-19: Primary | ICD-10-CM

## 2023-03-09 DIAGNOSIS — G47.33 OSA (OBSTRUCTIVE SLEEP APNEA): Primary | ICD-10-CM

## 2023-03-09 PROCEDURE — 99202 OFFICE O/P NEW SF 15 MIN: CPT | Performed by: INTERNAL MEDICINE

## 2023-03-09 PROCEDURE — 99213 OFFICE O/P EST LOW 20 MIN: CPT | Performed by: INTERNAL MEDICINE

## 2023-03-09 NOTE — TELEPHONE ENCOUNTER
Pt calling, she states that she was squeezed in for an appointment today at 1:15. She woke up this morning not feeling well, and she tested Positive for Covid. She would like to know if this appointment can be a video call so that she can get a CPap ordered. 1:15 appointment has not been cancelled at this time.   Please call

## 2023-03-09 NOTE — TELEPHONE ENCOUNTER
Per conversation with Dr. Nenita Polo, Alyssa Mary to switch patient to video visit. Spoke with patient informed her that visit type was switched to video, patient verbalized understanding.

## 2023-03-09 NOTE — PROGRESS NOTES
Dear  Jyothi Bustamante  :           As you know, Christal Mcgarry is a 59-year-old female who I am now evaluating for sleep disordered breathing. HISTORY OF PRESENT ILLNESS: The patient had an episode of mental status changes which has dramatically improved. She is back at work without issue. She underwent sleep testing and was found to have 17.8 respiratory events per hour and rising to 34.9/h and REM sleep. She was titrated to a CPAP of 7 CWP and has yet to be set up with her equipment. She notes that she does fall asleep quickly. She never gets up easily and that she has unrefreshing sleep. She has occasional drowsy driving and excessive daytime somnolence. There is no cataplexy, sleep paralysis, hypnagogic hallucination no urge to move the limbs. She goes to bed around 11:00 at night. She admits to significant weight gain. PAST MEDICAL AND SURGICAL HISTORY:   1. Transient mental status changes, celiac disease, UTI    SOCIAL HISTORY: Single, 4 children, no tobacco, occasional alcohol,     FAMILY HISTORY: Mother  COPD, father  lung cancer    ALLERGIES TO MEDICATIONS:  none    MEDICATIONS: See medicine reconciliation sheet    REVIEW OF SYSTEMS: Review of Systems:  Vision normal. Ear nose and throat normal. Bowel normal. Bladder function normal. No depression. No thyroid disease. No lymphatic system concerns. No rash. Muscles and joints unremarkable. Weight gain. PHYSICAL EXAMINATION: This was a video visit show no physical examination hands-on was performed although the patient is fluent in her speech and has good coloring and is conversant. LABORATORY: Polysomnography-apnea plus hypopnea index 17.8 events per hour rising to 34.9/h and REM sleep. CPAP titrated to 7 CWP.     ASSESSMENT AND PLAN:  PROBLEM 1.  Moderate obstructive sleep apnea- the patient has increased BMI with clinical syndrome of occasional drowsy driving, excessive daytime somnolence, unrefreshing sleep and she has had an episode of transient mental status changes. Altogether, I will facilitate initiation of CPAP 7 CWP and follow-up. RECOMMENDATIONS:  1. CPAP 7 CWP every night all night  2. Weight loss  3. Avoid alcohol  4. Avoid sedating drug  5. Never drive if sleepy  6. See me in the office at the 3-month interval and I will facilitate download of data from the respiratory device to assess for efficacy and compliance. Patient to contact me promptly if there is new trouble in the meantime. I am delighted to assist in Tatianna's care.             With warmest regards,     Opal Deluca MD  Medical Director, [address] [facility]  Medical Director, [facility]

## 2023-03-10 NOTE — PROGRESS NOTES
Scheduled for:  Colonoscopy 10198    Provider Name:  Dr. Akbar Wilkins  Date: Tuesday, 6/13/2023  Location:  [facility]  Sedation:  MAC  Time:  1:00 PM (pt is aware to arrive at 12:00 PM)  Prep:  Split dose miralax  Meds/Allergies Reconciled?:  Physician reviewed  Diagnosis with codes:  Hx of colon polyps Z86.010  Was patient informed to call insurance with codes (Y/N):  I confirmed rumr: turn off the lights with this patient. Referral sent?:  Referral was sent at the time of electronic surgical scheduling. [address] [facility] or [id number]  notified?:  I sent an electronic request to Endo Scheduling and received a confirmation today. Medication Orders: DO NOT TAKE: Iron pills, herbal supplements, multi-vitamins, or diet medications (i.e. Phentermine/Vyvanse) for 7 days before exam.    Misc Orders:  Patient was informed that they will need a COVID 19 test prior to their procedure. Patient verbally understood & will await a phone call from [facility] to schedule. Further instructions given by staff:  I discussed the prep instructions with the patient which she verbally understood and is aware that I will send the instructions today via [address]

## 2023-03-14 ENCOUNTER — HOSPITAL ENCOUNTER (OUTPATIENT)
Dept: ELECTROPHYSIOLOGY | Facility: HOSPITAL | Age: 63
Discharge: HOME | End: 2023-03-14
Attending: Other
Payer: COMMERCIAL

## 2023-03-14 PROBLEM — G93.40 ENCEPHALOPATHY: Status: ACTIVE | Noted: 2019-12-03

## 2023-03-14 PROCEDURE — 95700 EEG CONT REC W/VID EEG TECH: CPT

## 2023-03-14 PROCEDURE — 95709 EEG W/O VID EA 12-26HR INTMT: CPT

## 2023-03-14 PROCEDURE — 95708 EEG WO VID EA 12-26HR UNMNTR: CPT

## 2023-03-15 ENCOUNTER — HOSPITAL ENCOUNTER (OUTPATIENT)
Dept: ELECTROPHYSIOLOGY | Facility: HOSPITAL | Age: 63
Discharge: HOME | End: 2023-03-15
Attending: Other
Payer: COMMERCIAL

## 2023-03-15 PROCEDURE — 95708 EEG WO VID EA 12-26HR UNMNTR: CPT

## 2023-03-15 PROCEDURE — 95709 EEG W/O VID EA 12-26HR INTMT: CPT

## 2023-03-16 ENCOUNTER — HOSPITAL ENCOUNTER (OUTPATIENT)
Dept: ELECTROPHYSIOLOGY | Facility: HOSPITAL | Age: 63
Discharge: HOME | End: 2023-03-16
Attending: Other
Payer: COMMERCIAL

## 2023-03-16 PROCEDURE — 95708 EEG WO VID EA 12-26HR UNMNTR: CPT

## 2023-03-17 ENCOUNTER — HOSPITAL ENCOUNTER (OUTPATIENT)
Dept: ELECTROPHYSIOLOGY | Facility: HOSPITAL | Age: 63
Discharge: HOME | End: 2023-03-17
Attending: Other
Payer: COMMERCIAL

## 2023-03-23 PROBLEM — R29.818 TRANSIENT NEUROLOGIC DEFICIT: Status: ACTIVE | Noted: 2023-01-18

## 2023-03-29 ENCOUNTER — OFFICE VISIT (OUTPATIENT)
Dept: INTERNAL MEDICINE CLINIC | Facility: CLINIC | Age: 63
End: 2023-03-29

## 2023-03-29 VITALS
OXYGEN SATURATION: 97 % | WEIGHT: 168.38 LBS | BODY MASS INDEX: 33 KG/M2 | TEMPERATURE: 97 F | SYSTOLIC BLOOD PRESSURE: 128 MMHG | HEART RATE: 80 BPM | DIASTOLIC BLOOD PRESSURE: 70 MMHG

## 2023-03-29 DIAGNOSIS — N87.1 CIN II (CERVICAL INTRAEPITHELIAL NEOPLASIA II): ICD-10-CM

## 2023-03-29 DIAGNOSIS — F41.9 ANXIETY: ICD-10-CM

## 2023-03-29 DIAGNOSIS — J30.2 SEASONAL ALLERGIES: ICD-10-CM

## 2023-03-29 DIAGNOSIS — K90.0 CELIAC DISEASE: ICD-10-CM

## 2023-03-29 DIAGNOSIS — Z00.00 PHYSICAL EXAM: Primary | ICD-10-CM

## 2023-03-29 DIAGNOSIS — G93.40 ENCEPHALOPATHY: ICD-10-CM

## 2023-03-29 DIAGNOSIS — E55.9 VITAMIN D DEFICIENCY: ICD-10-CM

## 2023-03-29 DIAGNOSIS — G47.33 OBSTRUCTIVE SLEEP APNEA SYNDROME: ICD-10-CM

## 2023-03-29 PROCEDURE — 99396 PREV VISIT EST AGE 40-64: CPT | Performed by: INTERNAL MEDICINE

## 2023-03-29 PROCEDURE — 3078F DIAST BP <80 MM HG: CPT | Performed by: INTERNAL MEDICINE

## 2023-03-29 PROCEDURE — 3074F SYST BP LT 130 MM HG: CPT | Performed by: INTERNAL MEDICINE

## 2023-03-29 NOTE — PROGRESS NOTES
Dr Vincenzo Flores    The patient is scheduled for colonoscopy on 6/13/2023. Please advise if the patient has clearance to proceed with the procedure from neurology standpoint. Thank you.

## 2023-04-03 NOTE — PROGRESS NOTES
Patient contacted, verified and message from Dr. Fletcher Olivo given. Patient voiced understanding.

## 2023-04-07 ENCOUNTER — LAB ENCOUNTER (OUTPATIENT)
Dept: LAB | Facility: HOSPITAL | Age: 63
End: 2023-04-07
Attending: INTERNAL MEDICINE
Payer: COMMERCIAL

## 2023-04-07 DIAGNOSIS — E55.9 VITAMIN D DEFICIENCY: ICD-10-CM

## 2023-04-07 DIAGNOSIS — G93.40 ENCEPHALOPATHY: ICD-10-CM

## 2023-04-07 DIAGNOSIS — Z00.00 PHYSICAL EXAM: ICD-10-CM

## 2023-04-07 LAB
ALBUMIN SERPL-MCNC: 3.4 G/DL (ref 3.4–5)
ALBUMIN/GLOB SERPL: 0.8 {RATIO} (ref 1–2)
ALP LIVER SERPL-CCNC: 78 U/L
ALT SERPL-CCNC: 26 U/L
ANION GAP SERPL CALC-SCNC: 4 MMOL/L (ref 0–18)
AST SERPL-CCNC: 18 U/L (ref 15–37)
BASOPHILS # BLD AUTO: 0.04 X10(3) UL (ref 0–0.2)
BASOPHILS NFR BLD AUTO: 0.7 %
BILIRUB SERPL-MCNC: 0.5 MG/DL (ref 0.1–2)
BUN BLD-MCNC: 16 MG/DL (ref 7–18)
BUN/CREAT SERPL: 19 (ref 10–20)
CALCIUM BLD-MCNC: 9 MG/DL (ref 8.5–10.1)
CHLORIDE SERPL-SCNC: 110 MMOL/L (ref 98–112)
CHOLEST SERPL-MCNC: 191 MG/DL (ref ?–200)
CO2 SERPL-SCNC: 26 MMOL/L (ref 21–32)
CREAT BLD-MCNC: 0.84 MG/DL
CRP SERPL-MCNC: <0.29 MG/DL (ref ?–0.3)
DEPRECATED RDW RBC AUTO: 42.9 FL (ref 35.1–46.3)
EOSINOPHIL # BLD AUTO: 0.13 X10(3) UL (ref 0–0.7)
EOSINOPHIL NFR BLD AUTO: 2.3 %
ERYTHROCYTE [DISTWIDTH] IN BLOOD BY AUTOMATED COUNT: 13.2 % (ref 11–15)
ERYTHROCYTE [SEDIMENTATION RATE] IN BLOOD: 13 MM/HR
EST. AVERAGE GLUCOSE BLD GHB EST-MCNC: 111 MG/DL (ref 68–126)
FASTING PATIENT LIPID ANSWER: YES
FASTING STATUS PATIENT QL REPORTED: YES
GFR SERPLBLD BASED ON 1.73 SQ M-ARVRAT: 78 ML/MIN/1.73M2 (ref 60–?)
GLOBULIN PLAS-MCNC: 4.2 G/DL (ref 2.8–4.4)
GLUCOSE BLD-MCNC: 109 MG/DL (ref 70–99)
HBA1C MFR BLD: 5.5 % (ref ?–5.7)
HCT VFR BLD AUTO: 41.5 %
HDLC SERPL-MCNC: 68 MG/DL (ref 40–59)
HGB BLD-MCNC: 13.5 G/DL
IMM GRANULOCYTES # BLD AUTO: 0.01 X10(3) UL (ref 0–1)
IMM GRANULOCYTES NFR BLD: 0.2 %
LDLC SERPL CALC-MCNC: 107 MG/DL (ref ?–100)
LYMPHOCYTES # BLD AUTO: 2.65 X10(3) UL (ref 1–4)
LYMPHOCYTES NFR BLD AUTO: 46.2 %
MCH RBC QN AUTO: 28.8 PG (ref 26–34)
MCHC RBC AUTO-ENTMCNC: 32.5 G/DL (ref 31–37)
MCV RBC AUTO: 88.7 FL
MONOCYTES # BLD AUTO: 0.5 X10(3) UL (ref 0.1–1)
MONOCYTES NFR BLD AUTO: 8.7 %
NEUTROPHILS # BLD AUTO: 2.4 X10 (3) UL (ref 1.5–7.7)
NEUTROPHILS # BLD AUTO: 2.4 X10(3) UL (ref 1.5–7.7)
NEUTROPHILS NFR BLD AUTO: 41.9 %
NONHDLC SERPL-MCNC: 123 MG/DL (ref ?–130)
OSMOLALITY SERPL CALC.SUM OF ELEC: 292 MOSM/KG (ref 275–295)
PLATELET # BLD AUTO: 321 10(3)UL (ref 150–450)
POTASSIUM SERPL-SCNC: 3.9 MMOL/L (ref 3.5–5.1)
PROT SERPL-MCNC: 7.6 G/DL (ref 6.4–8.2)
RBC # BLD AUTO: 4.68 X10(6)UL
SODIUM SERPL-SCNC: 140 MMOL/L (ref 136–145)
TRIGL SERPL-MCNC: 89 MG/DL (ref 30–149)
TSI SER-ACNC: 3.04 MIU/ML (ref 0.36–3.74)
VIT D+METAB SERPL-MCNC: 24.1 NG/ML (ref 30–100)
VLDLC SERPL CALC-MCNC: 15 MG/DL (ref 0–30)
WBC # BLD AUTO: 5.7 X10(3) UL (ref 4–11)

## 2023-04-07 PROCEDURE — 84443 ASSAY THYROID STIM HORMONE: CPT

## 2023-04-07 PROCEDURE — 83036 HEMOGLOBIN GLYCOSYLATED A1C: CPT

## 2023-04-07 PROCEDURE — 83516 IMMUNOASSAY NONANTIBODY: CPT

## 2023-04-07 PROCEDURE — 86140 C-REACTIVE PROTEIN: CPT

## 2023-04-07 PROCEDURE — 83876 ASSAY MYELOPEROXIDASE: CPT

## 2023-04-07 PROCEDURE — 85025 COMPLETE CBC W/AUTO DIFF WBC: CPT

## 2023-04-07 PROCEDURE — 85652 RBC SED RATE AUTOMATED: CPT

## 2023-04-07 PROCEDURE — 86038 ANTINUCLEAR ANTIBODIES: CPT

## 2023-04-07 PROCEDURE — 80053 COMPREHEN METABOLIC PANEL: CPT

## 2023-04-07 PROCEDURE — 86225 DNA ANTIBODY NATIVE: CPT

## 2023-04-07 PROCEDURE — 80061 LIPID PANEL: CPT

## 2023-04-07 PROCEDURE — 86036 ANCA SCREEN EACH ANTIBODY: CPT

## 2023-04-07 PROCEDURE — 82306 VITAMIN D 25 HYDROXY: CPT

## 2023-04-07 PROCEDURE — 36415 COLL VENOUS BLD VENIPUNCTURE: CPT

## 2023-04-09 LAB
MYELOPEROX ANTIBODIES, IGG: 0 AU/ML
SERINE PROTEASE 3, IGG: 0 AU/ML

## 2023-04-12 LAB
DSDNA IGG SERPL IA-ACNC: 1.4 IU/ML
ENA AB SER QL IA: 0.2 UG/L
ENA AB SER QL IA: NEGATIVE

## 2023-04-14 LAB
ANCA BY IFA (RDL): NEGATIVE
ANTI-MPO AB (RDL): <20 UNITS
ANTI-PR-3 AB (RDL): <20 UNITS

## 2023-04-24 NOTE — ASSESSMENT & PLAN NOTE
Recurrent UTI–Enterobacter species at this time. Patient has not taken her antibiotics. She does not have any symptoms of UTI unless she gets dehydrated.   We will advised to start on the antibiotics and give me a call if symptoms are not better after 5 d States he stopped taking a stool softener  No sick takes prune juice which is effective  Advised to maintain hydration

## 2023-04-26 ENCOUNTER — TELEPHONE (OUTPATIENT)
Dept: INTERNAL MEDICINE CLINIC | Facility: CLINIC | Age: 63
End: 2023-04-26

## 2023-04-26 ENCOUNTER — LAB ENCOUNTER (OUTPATIENT)
Dept: LAB | Facility: HOSPITAL | Age: 63
End: 2023-04-26
Attending: INTERNAL MEDICINE
Payer: COMMERCIAL

## 2023-04-26 DIAGNOSIS — R30.0 DYSURIA: Primary | ICD-10-CM

## 2023-04-26 DIAGNOSIS — R30.0 DYSURIA: ICD-10-CM

## 2023-04-26 LAB
BILIRUB UR QL: NEGATIVE
CLARITY UR: CLEAR
GLUCOSE UR-MCNC: NORMAL MG/DL
HGB UR QL STRIP.AUTO: NEGATIVE
KETONES UR-MCNC: NEGATIVE MG/DL
LEUKOCYTE ESTERASE UR QL STRIP.AUTO: 500
NITRITE UR QL STRIP.AUTO: NEGATIVE
PH UR: 5 [PH] (ref 5–8)
PROT UR-MCNC: NEGATIVE MG/DL
SP GR UR STRIP: 1.02 (ref 1–1.03)
UROBILINOGEN UR STRIP-ACNC: NORMAL
WBC #/AREA URNS AUTO: >50 /HPF

## 2023-04-26 PROCEDURE — 87086 URINE CULTURE/COLONY COUNT: CPT

## 2023-04-26 PROCEDURE — 81001 URINALYSIS AUTO W/SCOPE: CPT | Performed by: INTERNAL MEDICINE

## 2023-04-26 RX ORDER — SULFAMETHOXAZOLE AND TRIMETHOPRIM 800; 160 MG/1; MG/1
1 TABLET ORAL 2 TIMES DAILY
Qty: 10 TABLET | Refills: 0 | Status: SHIPPED | OUTPATIENT
Start: 2023-04-26

## 2023-04-26 NOTE — TELEPHONE ENCOUNTER
Pt. Called stating she has UTI symptoms which are frequency, burning, discomfort. She is prone to getting them. She states with her previous Physician, she used to have a standing order for a U/A. She is asking if Dr. Gerber Murray would order her a U/A? She also has a cold sore on her lip which she is currently treating with Valtrex.

## 2023-04-26 NOTE — TELEPHONE ENCOUNTER
Called and spoke with patient. Relayed MD's message. Patient will give a urine sample and then start antibiotics.

## 2023-04-26 NOTE — TELEPHONE ENCOUNTER
To Dr Chiqui Sifuentes with pt. States on Monday, she developed a cold sore on her lip which she usually gets before she has any illness or UTI. Yesterday developed urinary symptoms, reports sx below. Denies fever, flank pain, hematuria. UA/Ucx ordered per protocol. UTI symptoms:  [x]Frequency  [x]Urgency  [x]Pain/burning 3/10  []Blood in urine  []Low back pain  []Flank pain  []Fevers/chills  []Odor  []Confusion    [place] [address] [id number] [facility] [place], [place] - [address] [address] [facility]  AT [address] [facility], [phone], [phone].

## 2023-04-26 NOTE — TELEPHONE ENCOUNTER
Urinalysis ordered. We will empirically treat with 5-day course of Bactrim.   Prescription sent to pharmacy

## 2023-05-02 ENCOUNTER — LAB ENCOUNTER (OUTPATIENT)
Dept: LAB | Facility: HOSPITAL | Age: 63
End: 2023-05-02
Attending: INTERNAL MEDICINE
Payer: COMMERCIAL

## 2023-05-02 DIAGNOSIS — R30.0 DYSURIA: ICD-10-CM

## 2023-05-02 LAB
BILIRUB UR QL: NEGATIVE
CLARITY UR: CLEAR
GLUCOSE UR-MCNC: NORMAL MG/DL
HGB UR QL STRIP.AUTO: NEGATIVE
KETONES UR-MCNC: NEGATIVE MG/DL
LEUKOCYTE ESTERASE UR QL STRIP.AUTO: NEGATIVE
NITRITE UR QL STRIP.AUTO: NEGATIVE
PH UR: 5 [PH] (ref 5–8)
PROT UR-MCNC: NEGATIVE MG/DL
SP GR UR STRIP: 1.03 (ref 1–1.03)
UROBILINOGEN UR STRIP-ACNC: NORMAL

## 2023-05-10 ENCOUNTER — TELEPHONE (OUTPATIENT)
Dept: PULMONOLOGY | Facility: CLINIC | Age: 63
End: 2023-05-10

## 2023-05-10 DIAGNOSIS — G47.33 OSA (OBSTRUCTIVE SLEEP APNEA): Primary | ICD-10-CM

## 2023-05-10 NOTE — TELEPHONE ENCOUNTER
Order signed by Dr. Cassi Bar. Could the [facility] managed care team please obtain prior auth?     Thank you

## 2023-05-10 NOTE — TELEPHONE ENCOUNTER
Received fax from [facility] stating that there is no  Heated humidifier on the referral.       Dr. Tanya Connor- please review pended orders so that PA can be obtained. Sign if agreeable. Thank you.

## 2023-05-13 ENCOUNTER — TELEPHONE (OUTPATIENT)
Dept: INTERNAL MEDICINE CLINIC | Facility: CLINIC | Age: 63
End: 2023-05-13

## 2023-05-13 DIAGNOSIS — N30.00 ACUTE CYSTITIS WITHOUT HEMATURIA: Primary | ICD-10-CM

## 2023-05-13 RX ORDER — SULFAMETHOXAZOLE AND TRIMETHOPRIM 800; 160 MG/1; MG/1
1 TABLET ORAL 2 TIMES DAILY
Qty: 20 TABLET | Refills: 0 | Status: SHIPPED | OUTPATIENT
Start: 2023-05-13 | End: 2023-06-09

## 2023-05-13 NOTE — TELEPHONE ENCOUNTER
On call telephone call from pt who has developed a UTI--urinary frequency and burning today. Pt has a long history of having UTIs. She was recently treated for a UTI with Bactrim DS. Pt is now headed to [facility], [place]. Pt is to push fluids. I have sent a prescription for Bactrim DS to [facility] in [facility], [place]. I will place an order for pt to have a U/A with Urine Culture reflex to obtain after she completes her course of Bactrim DS. If pt gets worse--fever, chills, etc. Then pt is to go to the ER. I will forward this message to Dr Gerber Murray as an Dejah Pérez.

## 2023-05-23 ENCOUNTER — TELEPHONE (OUTPATIENT)
Dept: PULMONOLOGY | Facility: CLINIC | Age: 63
End: 2023-05-23

## 2023-05-23 NOTE — TELEPHONE ENCOUNTER
Received a CPAP set-up confirmation from [address] [place] [address]. Patient due for follow-up between 6/22/23- 8/20/23. Patient scheduled for appointment on 8/7/23.

## 2023-06-01 ENCOUNTER — HOSPITAL ENCOUNTER (EMERGENCY)
Facility: HOSPITAL | Age: 63
Discharge: HOME | End: 2023-06-01
Attending: STUDENT IN AN ORGANIZED HEALTH CARE EDUCATION/TRAINING PROGRAM
Payer: COMMERCIAL

## 2023-06-01 ENCOUNTER — OFFICE VISIT (OUTPATIENT)
Dept: FAMILY MEDICINE CLINIC | Facility: CLINIC | Age: 63
End: 2023-06-01
Payer: COMMERCIAL

## 2023-06-01 VITALS
HEIGHT: 60 IN | HEART RATE: 69 BPM | RESPIRATION RATE: 16 BRPM | SYSTOLIC BLOOD PRESSURE: 158 MMHG | DIASTOLIC BLOOD PRESSURE: 67 MMHG | WEIGHT: 169 LBS | TEMPERATURE: 98 F | BODY MASS INDEX: 33.18 KG/M2 | OXYGEN SATURATION: 99 %

## 2023-06-01 VITALS
SYSTOLIC BLOOD PRESSURE: 159 MMHG | OXYGEN SATURATION: 99 % | DIASTOLIC BLOOD PRESSURE: 82 MMHG | HEART RATE: 74 BPM | TEMPERATURE: 98 F | WEIGHT: 167 LBS | RESPIRATION RATE: 16 BRPM | BODY MASS INDEX: 33 KG/M2

## 2023-06-01 DIAGNOSIS — S41.112A LACERATION OF LEFT UPPER ARM, INITIAL ENCOUNTER: Primary | ICD-10-CM

## 2023-06-01 PROCEDURE — 99284 EMERGENCY DEPT VISIT MOD MDM: CPT

## 2023-06-01 PROCEDURE — 99283 EMERGENCY DEPT VISIT LOW MDM: CPT

## 2023-06-01 PROCEDURE — 99212 OFFICE O/P EST SF 10 MIN: CPT | Performed by: NURSE PRACTITIONER

## 2023-06-01 PROCEDURE — 3077F SYST BP >= 140 MM HG: CPT | Performed by: NURSE PRACTITIONER

## 2023-06-01 PROCEDURE — 3078F DIAST BP <80 MM HG: CPT | Performed by: NURSE PRACTITIONER

## 2023-06-01 PROCEDURE — 12002 RPR S/N/AX/GEN/TRNK2.6-7.5CM: CPT

## 2023-06-01 PROCEDURE — 90471 IMMUNIZATION ADMIN: CPT

## 2023-06-01 PROCEDURE — 3008F BODY MASS INDEX DOCD: CPT | Performed by: NURSE PRACTITIONER

## 2023-06-02 ENCOUNTER — PATIENT OUTREACH (OUTPATIENT)
Dept: CASE MANAGEMENT | Age: 63
End: 2023-06-02

## 2023-06-02 NOTE — DISCHARGE INSTRUCTIONS
Please return to the emergency department or to your primary care doctor in 7-10 days to have  your sutures removed. Return to the emergency department earlier if you develop fevers, if you  see pus coming from the wound, or if you develop redness around the wound that extends  beyond 1 inch from the wound edges as these can be signs of wound infection.

## 2023-06-02 NOTE — ED INITIAL ASSESSMENT (HPI)
Patient arrives with a upper left arm laceration. Patient was walking through the isles of a store when her arm got cut on a shelf. Bleeding controlled.

## 2023-06-07 ENCOUNTER — LAB ENCOUNTER (OUTPATIENT)
Dept: LAB | Facility: HOSPITAL | Age: 63
End: 2023-06-07
Attending: INTERNAL MEDICINE
Payer: COMMERCIAL

## 2023-06-07 DIAGNOSIS — N30.00 ACUTE CYSTITIS WITHOUT HEMATURIA: ICD-10-CM

## 2023-06-07 LAB
BILIRUB UR QL: NEGATIVE
COLOR UR: YELLOW
GLUCOSE UR-MCNC: NORMAL MG/DL
KETONES UR-MCNC: NEGATIVE MG/DL
LEUKOCYTE ESTERASE UR QL STRIP.AUTO: 500
NITRITE UR QL STRIP.AUTO: NEGATIVE
PH UR: 5 [PH] (ref 5–8)
PROT UR-MCNC: 20 MG/DL
RBC #/AREA URNS AUTO: >10 /HPF
SP GR UR STRIP: 1.03 (ref 1–1.03)
UROBILINOGEN UR STRIP-ACNC: NORMAL
WBC #/AREA URNS AUTO: >50 /HPF

## 2023-06-07 PROCEDURE — 81001 URINALYSIS AUTO W/SCOPE: CPT

## 2023-06-07 PROCEDURE — 87086 URINE CULTURE/COLONY COUNT: CPT

## 2023-06-07 PROCEDURE — 87077 CULTURE AEROBIC IDENTIFY: CPT

## 2023-06-08 DIAGNOSIS — R30.0 DYSURIA: Primary | ICD-10-CM

## 2023-06-08 RX ORDER — NITROFURANTOIN 25; 75 MG/1; MG/1
100 CAPSULE ORAL 2 TIMES DAILY
Qty: 10 CAPSULE | Refills: 0 | Status: SHIPPED | OUTPATIENT
Start: 2023-06-08 | End: 2023-06-09

## 2023-06-08 NOTE — TELEPHONE ENCOUNTER
On 6/7 patient having frequency, pressure and pain with urination.    To Dr. Lucrecia Welsh urine culture not resulted

## 2023-06-08 NOTE — TELEPHONE ENCOUNTER
Yes, urinalysis continues to show white cells consistent with UTI  Therefore will need to retreat, but this time we will use a different antibiotic. Prescription sent to pharmacy.   We will need to check follow-up urinalysis after completing antibiotic  However, if patient continues to have frequent UTIs, may need further urological evaluation

## 2023-06-08 NOTE — TELEPHONE ENCOUNTER
Patient called  Finished antibiotic and went for follow up urine test on 6/7/23  Patient believes she has another UTI, has frequency beginning and pressure, pain   Results look like she might have another infection  Please call to advise  Tasked to nursing

## 2023-06-09 ENCOUNTER — TELEPHONE (OUTPATIENT)
Dept: INTERNAL MEDICINE CLINIC | Facility: CLINIC | Age: 63
End: 2023-06-09

## 2023-06-09 RX ORDER — AMOXICILLIN 500 MG/1
500 TABLET, FILM COATED ORAL 2 TIMES DAILY
Qty: 10 TABLET | Refills: 0 | Status: SHIPPED | OUTPATIENT
Start: 2023-06-09

## 2023-06-09 NOTE — TELEPHONE ENCOUNTER
Urine culture is growing Streptococcus organism which may not be sensitive to the antibiotic the patient is currently taking. Therefore discontinue current Macrobid. I have instead sent off prescription for amoxicillin that patient should take for 5 days to treat this infection. Should recheck urinalysis after completing antibiotic.

## 2023-06-09 NOTE — TELEPHONE ENCOUNTER
Patient called for clarification because she got a call from the pharmacy for a new antibiotic. Relayed MD's message. Patient verbalized understanding.

## 2023-06-12 ENCOUNTER — TELEPHONE (OUTPATIENT)
Dept: INTERNAL MEDICINE CLINIC | Facility: CLINIC | Age: 63
End: 2023-06-12

## 2023-06-12 DIAGNOSIS — N39.0 URINARY TRACT INFECTION WITHOUT HEMATURIA, SITE UNSPECIFIED: Primary | ICD-10-CM

## 2023-06-12 NOTE — TELEPHONE ENCOUNTER
Spoke to patient and advised per MD message, pt verbalized understanding states that tomorrow she is having the colonoscopy but that she would finish her Amoxicillin tablet tomorrow night. Stated that she would like to retest for u/a the following week so that she can confirm the uti is gone completely states she gets uti's a lot and that if tested too soon it shows she is clean but a few days later it comes back. Patient just wants to know if pcp is okay with her waiting to take the urine test for next week instead of as soon as she finishes the abx.      Please advise

## 2023-06-12 NOTE — TELEPHONE ENCOUNTER
----- Message from Rafiq Hoover MD sent at 6/12/2023  7:54 AM CDT -----  Urine culture is growing strep--should be sensitive to the antibiotic patient was placed on last week. We will need to check follow-up urinalysis after completing antibiotics to make sure infection has cleared.   Order has been placed

## 2023-06-13 ENCOUNTER — HOSPITAL ENCOUNTER (OUTPATIENT)
Facility: HOSPITAL | Age: 63
Setting detail: HOSPITAL OUTPATIENT SURGERY
Discharge: HOME | End: 2023-06-13
Attending: INTERNAL MEDICINE | Admitting: INTERNAL MEDICINE
Payer: COMMERCIAL

## 2023-06-13 ENCOUNTER — ANESTHESIA EVENT (OUTPATIENT)
Dept: ENDOSCOPY | Facility: HOSPITAL | Age: 63
End: 2023-06-13
Payer: COMMERCIAL

## 2023-06-13 ENCOUNTER — ANESTHESIA (OUTPATIENT)
Dept: ENDOSCOPY | Facility: HOSPITAL | Age: 63
End: 2023-06-13
Payer: COMMERCIAL

## 2023-06-13 VITALS
RESPIRATION RATE: 15 BRPM | SYSTOLIC BLOOD PRESSURE: 123 MMHG | HEART RATE: 69 BPM | WEIGHT: 167 LBS | OXYGEN SATURATION: 99 % | DIASTOLIC BLOOD PRESSURE: 73 MMHG | BODY MASS INDEX: 32.79 KG/M2 | TEMPERATURE: 98 F | HEIGHT: 60 IN

## 2023-06-13 DIAGNOSIS — K90.0 CELIAC SPRUE: Primary | ICD-10-CM

## 2023-06-13 DIAGNOSIS — Z86.010 PERSONAL HISTORY OF COLONIC POLYPS: ICD-10-CM

## 2023-06-13 PROCEDURE — 45385 COLONOSCOPY W/LESION REMOVAL: CPT | Performed by: INTERNAL MEDICINE

## 2023-06-13 PROCEDURE — 0DBL8ZX EXCISION OF TRANSVERSE COLON, VIA NATURAL OR ARTIFICIAL OPENING ENDOSCOPIC, DIAGNOSTIC: ICD-10-PCS | Performed by: INTERNAL MEDICINE

## 2023-06-13 PROCEDURE — 45380 COLONOSCOPY AND BIOPSY: CPT | Performed by: INTERNAL MEDICINE

## 2023-06-13 PROCEDURE — 0DBN8ZX EXCISION OF SIGMOID COLON, VIA NATURAL OR ARTIFICIAL OPENING ENDOSCOPIC, DIAGNOSTIC: ICD-10-PCS | Performed by: INTERNAL MEDICINE

## 2023-06-13 PROCEDURE — 0DBK8ZX EXCISION OF ASCENDING COLON, VIA NATURAL OR ARTIFICIAL OPENING ENDOSCOPIC, DIAGNOSTIC: ICD-10-PCS | Performed by: INTERNAL MEDICINE

## 2023-06-13 RX ORDER — DEXTROSE MONOHYDRATE 25 G/50ML
50 INJECTION, SOLUTION INTRAVENOUS
Status: DISCONTINUED | OUTPATIENT
Start: 2023-06-13 | End: 2023-06-13

## 2023-06-13 RX ORDER — SODIUM CHLORIDE, SODIUM LACTATE, POTASSIUM CHLORIDE, CALCIUM CHLORIDE 600; 310; 30; 20 MG/100ML; MG/100ML; MG/100ML; MG/100ML
INJECTION, SOLUTION INTRAVENOUS CONTINUOUS
Status: DISCONTINUED | OUTPATIENT
Start: 2023-06-13 | End: 2023-06-13

## 2023-06-13 RX ORDER — NALOXONE HYDROCHLORIDE 0.4 MG/ML
80 INJECTION, SOLUTION INTRAMUSCULAR; INTRAVENOUS; SUBCUTANEOUS AS NEEDED
Status: DISCONTINUED | OUTPATIENT
Start: 2023-06-13 | End: 2023-06-13

## 2023-06-13 RX ORDER — NICOTINE POLACRILEX 4 MG
30 LOZENGE BUCCAL
Status: DISCONTINUED | OUTPATIENT
Start: 2023-06-13 | End: 2023-06-13

## 2023-06-13 RX ORDER — NICOTINE POLACRILEX 4 MG
15 LOZENGE BUCCAL
Status: DISCONTINUED | OUTPATIENT
Start: 2023-06-13 | End: 2023-06-13

## 2023-06-13 RX ADMIN — SODIUM CHLORIDE, SODIUM LACTATE, POTASSIUM CHLORIDE, CALCIUM CHLORIDE: 600; 310; 30; 20 INJECTION, SOLUTION INTRAVENOUS at 14:02:00

## 2023-06-13 NOTE — OPERATIVE REPORT
[address] Endoscopy Report      Date of Procedure:  06/13/23      Preoperative Diagnosis:  1. Colorectal cancer screening  2. Personal history of adenomatous colon polyps      Postoperative Diagnosis:  1. Colon polyps  2. Uncomplicated diverticulosis right and left colon      Procedure:    Colonoscopy with polypectomy      Surgeon:  Thompson Joiner M.D. Anesthesia:  Monitored anesthesia care  Cecal withdrawal time: 18 minutes  EBL:  Insignificant      Brief History: This is a 61year old female who presents for a screening/surveillance colonoscopy the setting of a history of adenomatous colon polyps. The patient's last colonoscopy was 5 years prior with removal of a hyperplastic polyp. The patient has a history of celiac disease with symptoms only with inadvertent exposure to gluten. She is currently asymptomatic. Technique:  After informed consent, the patient was placed in the left lateral recumbent position. Digital rectal examination revealed no palpable intraluminal abnormalities. An Olympus variable stiffness 190 series HD colonoscope was inserted into the rectum and advanced under direct vision by following the lumen to the terminal ileum. The colon was examined upon withdrawal in the left lateral recumbent position. Findings:  The preparation of the colon was very good. The terminal ileum was examined for several cm and visually normal.  The ileocecal valve was well preserved. The visualized colonic mucosa from the cecum to the anal verge was normal with an intact vascular pattern. There were #4 polyps seen within the colon which removed as follows:    1. In the proximal ascending colon there was a 2-3 mm sessile polyp which was removed with a cold biopsy forceps. 2.  In the mid transverse colon there was a 4 mm sessile polyp which was cold snare excised and retrieved.   3.  In the sigmoid colon there were #2 5 mm sessile polyps which were cold snare excised and retrieved. Inspection of all sites revealed no evidence of ongoing bleeding. There were a few scattered diverticula seen in the right colon and in the sigmoid without current signs of complication. There were no other colonic polyps, mass lesions, vascular anomalies or signs of inflammation seen. Retroflexion in the rectum revealed no abnormalities. The procedure was well tolerated without immediate complication. Impression:  1. Diminutive/small colon polyps  2. Uncomplicated diverticulosis right and left colon    Recommendations:  1. High-fiber diet. 2.  Follow-up biopsy results. 3.  Check TTG IgA antibody. 4.  Further recommendations pending biopsy.         Chaitanya Tomas MD  6/13/2023

## 2023-06-13 NOTE — DISCHARGE INSTRUCTIONS
Home Care Instructions for Colonoscopy with Sedation    Diet:  - Resume your regular diet as tolerated unless otherwise instructed. - Start with light meals to minimize bloating.  - Do not drink alcohol today. Medication:  - If you have questions about resuming your normal medications, please contact your Primary Care Physician. Activities:  - Take it easy today. Do not return to work today. - Do not drive today. - Do not operate any machinery today (including kitchen equipment). -   Do not make any critical decisions or sign any paperwork. - Do not exercise today. Colonoscopy:  - You may notice some rectal \"spotting\" (a little blood on the toilet tissue) for a day or two after the exam. This is normal.  - If you experience any rectal bleeding (not spotting), persistent tenderness or sharp severe abdominal pains, oral temperature over 100 degrees Fahrenheit, light-headedness or dizziness, or any other problems, contact your doctor. **If unable to reach your doctor, please go to the [facility] Emergency Room**    - Your referring physician will receive a full report of your examination.  - If you do not hear from your doctor's office within two weeks of your biopsy, please call them for your results. You may be able to see your laboratory results in [facility] between 4 and 7 business days. In some cases, your physician may not have viewed the results before they are released to [address] If you have questions regarding your results contact the physician who ordered the test/exam by phone or via [address] by choosing \"Ask a Medical Question. \"

## 2023-06-15 ENCOUNTER — LAB ENCOUNTER (OUTPATIENT)
Dept: LAB | Facility: HOSPITAL | Age: 63
End: 2023-06-15
Attending: INTERNAL MEDICINE
Payer: COMMERCIAL

## 2023-06-15 DIAGNOSIS — K90.0 CELIAC SPRUE: ICD-10-CM

## 2023-06-15 LAB
BILIRUB UR QL: NEGATIVE
CLARITY UR: CLEAR
GLUCOSE UR-MCNC: NORMAL MG/DL
HGB UR QL STRIP.AUTO: NEGATIVE
KETONES UR-MCNC: NEGATIVE MG/DL
LEUKOCYTE ESTERASE UR QL STRIP.AUTO: NEGATIVE
NITRITE UR QL STRIP.AUTO: NEGATIVE
PH UR: 5 [PH] (ref 5–8)
PROT UR-MCNC: NEGATIVE MG/DL
SP GR UR STRIP: 1.02 (ref 1–1.03)
UROBILINOGEN UR STRIP-ACNC: NORMAL

## 2023-06-15 PROCEDURE — 36415 COLL VENOUS BLD VENIPUNCTURE: CPT

## 2023-06-15 PROCEDURE — 86364 TISS TRNSGLTMNASE EA IG CLAS: CPT

## 2023-06-16 ENCOUNTER — TELEPHONE (OUTPATIENT)
Facility: CLINIC | Age: 63
End: 2023-06-16

## 2023-06-16 NOTE — TELEPHONE ENCOUNTER
Health maintenance updated.      Last colonoscopy done 6/13/2023 by Dr Yuli Fox    Recall placed into Pt Outreach, next due on 6/2033 per Dr. Yuli Fox

## 2023-06-20 LAB — TTG IGA SER-ACNC: 1.1 U/ML (ref ?–7)

## 2023-08-07 ENCOUNTER — OFFICE VISIT (OUTPATIENT)
Dept: PULMONOLOGY | Facility: CLINIC | Age: 63
End: 2023-08-07

## 2023-08-07 VITALS
SYSTOLIC BLOOD PRESSURE: 126 MMHG | DIASTOLIC BLOOD PRESSURE: 78 MMHG | HEART RATE: 72 BPM | HEIGHT: 60 IN | BODY MASS INDEX: 34.16 KG/M2 | OXYGEN SATURATION: 98 % | WEIGHT: 174 LBS

## 2023-08-07 DIAGNOSIS — G47.33 OBSTRUCTIVE SLEEP APNEA SYNDROME: Primary | ICD-10-CM

## 2023-08-07 PROCEDURE — 99213 OFFICE O/P EST LOW 20 MIN: CPT | Performed by: INTERNAL MEDICINE

## 2023-08-07 PROCEDURE — 3074F SYST BP LT 130 MM HG: CPT | Performed by: INTERNAL MEDICINE

## 2023-08-07 PROCEDURE — 3078F DIAST BP <80 MM HG: CPT | Performed by: INTERNAL MEDICINE

## 2023-08-07 PROCEDURE — 3008F BODY MASS INDEX DOCD: CPT | Performed by: INTERNAL MEDICINE

## 2023-08-07 NOTE — PROGRESS NOTES
The patient is a 51-year-old female who I know well from prior evaluation and comes in now for follow-up. The download data of her CPAP device is excellent with average daily usage 7 hours and 8 minutes and residual events of 1.7/h down from 17/h at baseline. However, she is uncomfortable with the whole apparatus and has tried multiple different styles of mask interface. She has never tried a simple nasal cannula Butler LT. Review of Systems:  Vision normal. Ear nose and throat normal. Bowel normal. Bladder function normal. No depression. No thyroid disease. No lymphatic system concerns. No rash. Muscles and joints unremarkable. No weight loss no weight gain. Physical Examination:  Vital signs normal. HEENT examination is unremarkable with pupils equal round and reactive to light and accommodation. Neck without adenopathy, thyromegaly, JVD nor bruit. Lungs clear to auscultation and percussion. Cardiac regular rate and rhythm no murmur. Abdomen nontender, without hepatosplenomegaly and no mass appreciable. Extremities and Musculoskeletal without clubbing cyanosis nor edema, and mobility acceptable. Neurologic grossly intact with symmetric tone and strength and reflex. Assessment and plan:  1. Obstructive sleep apnea-excellent download but may benefit from increasing her humidity and trying a different style of mask interface. Recommendations: Patient to increase her humidity setting, change mask style to Butler LT, weight loss, avoid alcohol, avoid sedating drug, never drive if sleepy, vigilance with CPAP every night all night and see me in the office at the 1 year interval or sooner if needed. Contact me promptly if new trouble.

## 2023-08-08 ENCOUNTER — TELEPHONE (OUTPATIENT)
Dept: PULMONOLOGY | Facility: CLINIC | Age: 63
End: 2023-08-08

## 2023-08-09 NOTE — TELEPHONE ENCOUNTER
Spoke with Clarisse Doss at [facility] to see if faxed received for change of mask. Per Clarisse Doss, they did receive order and patient has been contacted.

## 2023-08-22 ENCOUNTER — TELEPHONE (OUTPATIENT)
Dept: PULMONOLOGY | Facility: CLINIC | Age: 63
End: 2023-08-22

## 2023-08-22 NOTE — TELEPHONE ENCOUNTER
Received order for CPAP supplies from [facility]. Placed in Dr. Darlene Ortega folder for signature.

## 2023-08-23 NOTE — TELEPHONE ENCOUNTER
Order signed by Dr. Alon Shirley and faxed to [facility]. Confirmation received, sent to scanning.

## 2023-10-18 ENCOUNTER — TELEPHONE (OUTPATIENT)
Dept: INTERNAL MEDICINE CLINIC | Facility: CLINIC | Age: 63
End: 2023-10-18

## 2023-10-18 ENCOUNTER — LAB ENCOUNTER (OUTPATIENT)
Dept: LAB | Facility: HOSPITAL | Age: 63
End: 2023-10-18
Attending: INTERNAL MEDICINE
Payer: COMMERCIAL

## 2023-10-18 DIAGNOSIS — R39.9 UTI SYMPTOMS: ICD-10-CM

## 2023-10-18 DIAGNOSIS — R39.9 UTI SYMPTOMS: Primary | ICD-10-CM

## 2023-10-18 LAB
BILIRUB UR QL: NEGATIVE
CLARITY UR: CLEAR
COLOR UR: COLORLESS
GLUCOSE UR-MCNC: NORMAL MG/DL
HGB UR QL STRIP.AUTO: NEGATIVE
KETONES UR-MCNC: NEGATIVE MG/DL
LEUKOCYTE ESTERASE UR QL STRIP.AUTO: 250
NITRITE UR QL STRIP.AUTO: NEGATIVE
PH UR: 6.5 [PH] (ref 5–8)
PROT UR-MCNC: NEGATIVE MG/DL
SP GR UR STRIP: 1.01 (ref 1–1.03)
UROBILINOGEN UR STRIP-ACNC: NORMAL

## 2023-10-18 PROCEDURE — 87186 SC STD MICRODIL/AGAR DIL: CPT

## 2023-10-18 PROCEDURE — 81001 URINALYSIS AUTO W/SCOPE: CPT

## 2023-10-18 PROCEDURE — 87086 URINE CULTURE/COLONY COUNT: CPT

## 2023-10-18 RX ORDER — NITROFURANTOIN 25; 75 MG/1; MG/1
100 CAPSULE ORAL 2 TIMES DAILY
Qty: 10 CAPSULE | Refills: 0 | Status: SHIPPED | OUTPATIENT
Start: 2023-10-18

## 2023-10-18 NOTE — TELEPHONE ENCOUNTER
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

## 2023-10-18 NOTE — TELEPHONE ENCOUNTER
To Dr. SAEED:  Pt will submit urine sample today -- asking for abx, will be leaving town tomorrow for a wedding.    UTI Symptoms:  [x]Frequency  [x]Urgency  []Pain/burning  []Blood in urine  []Low back pain  []Flank pain  []Fevers  []Chills  []Night sweats  [x]Odor  []Confusion  [x]Bladder distention  [x]Feeling that bladder isn't empty after urinating  []Cloudy urine      Start of symptoms:   Last night     Willing to get a UA/Cx:  [x]Yes   [] No      [facility] [id number] [place], [place]

## 2023-10-18 NOTE — TELEPHONE ENCOUNTER
Prescription for Macrobid twice daily x5 days sent to pharmacy  We will notify patient of urine culture results when available.

## 2023-10-19 DIAGNOSIS — R30.0 DYSURIA: Primary | ICD-10-CM

## 2023-10-20 DIAGNOSIS — R30.0 DYSURIA: Primary | ICD-10-CM

## 2023-11-13 ENCOUNTER — TELEPHONE (OUTPATIENT)
Dept: INTERNAL MEDICINE CLINIC | Facility: CLINIC | Age: 63
End: 2023-11-13

## 2023-11-13 NOTE — TELEPHONE ENCOUNTER
Called and spoke with patient. Patient states she is a . She states she had a crazy busy week last week. It was report card week. She felt stressed and anxious and felt a tightness in chest.     Patient states 2 nights in a row she woke up and felt like her arm was asleep or numb. She doesn't know if she just slept on it wrong, but she felt it weird that it happened 2 nights in a row.     Patient states she does not want to go to ER. She states she has been to the ER before and does not think that is necessary. Explained to patient that usually for any concerns of chest pain, we advise to go to the ER for evaluation and testing. Patient states she feels better today. She is just tired.     No available appointments with PCP. Patient asked to see an associate. Appointment scheduled for tomorrow with Dr. King.    EDITAI to Dr. King--

## 2023-11-13 NOTE — TELEPHONE ENCOUNTER
Pt. Called stating she developed chest pain on & off last week.  She woke up on Saturday morning and her rt. arm was numb.  She has noticed the pain yet today.  She mentions possible anxiety.  She is asking to be seen to check out her heart.

## 2023-11-14 ENCOUNTER — OFFICE VISIT (OUTPATIENT)
Dept: INTERNAL MEDICINE CLINIC | Facility: CLINIC | Age: 63
End: 2023-11-14

## 2023-11-14 ENCOUNTER — HOSPITAL ENCOUNTER (OUTPATIENT)
Dept: GENERAL RADIOLOGY | Facility: HOSPITAL | Age: 63
Discharge: HOME | End: 2023-11-14
Attending: INTERNAL MEDICINE
Payer: COMMERCIAL

## 2023-11-14 ENCOUNTER — TELEPHONE (OUTPATIENT)
Dept: INTERNAL MEDICINE CLINIC | Facility: CLINIC | Age: 63
End: 2023-11-14

## 2023-11-14 ENCOUNTER — LAB ENCOUNTER (OUTPATIENT)
Dept: LAB | Facility: HOSPITAL | Age: 63
End: 2023-11-14
Attending: INTERNAL MEDICINE
Payer: COMMERCIAL

## 2023-11-14 VITALS
HEART RATE: 73 BPM | WEIGHT: 175 LBS | SYSTOLIC BLOOD PRESSURE: 140 MMHG | RESPIRATION RATE: 20 BRPM | DIASTOLIC BLOOD PRESSURE: 80 MMHG | HEIGHT: 60 IN | TEMPERATURE: 99 F | OXYGEN SATURATION: 98 % | BODY MASS INDEX: 34.36 KG/M2

## 2023-11-14 DIAGNOSIS — R03.0 ELEVATED BLOOD PRESSURE READING: ICD-10-CM

## 2023-11-14 DIAGNOSIS — R07.89 OTHER CHEST PAIN: Primary | ICD-10-CM

## 2023-11-14 DIAGNOSIS — R20.0 NUMBNESS AND TINGLING OF RIGHT ARM: ICD-10-CM

## 2023-11-14 DIAGNOSIS — R07.89 OTHER CHEST PAIN: ICD-10-CM

## 2023-11-14 DIAGNOSIS — R07.2 PRECORDIAL CHEST PAIN: ICD-10-CM

## 2023-11-14 DIAGNOSIS — R20.2 NUMBNESS AND TINGLING OF RIGHT ARM: ICD-10-CM

## 2023-11-14 LAB
ATRIAL RATE: 69 BPM
BILIRUB UR QL: NEGATIVE
CLARITY UR: CLEAR
GLUCOSE UR-MCNC: NORMAL MG/DL
HGB UR QL STRIP.AUTO: NEGATIVE
KETONES UR-MCNC: NEGATIVE MG/DL
LEUKOCYTE ESTERASE UR QL STRIP.AUTO: NEGATIVE
NITRITE UR QL STRIP.AUTO: NEGATIVE
P AXIS: 39 DEGREES
P-R INTERVAL: 156 MS
PH UR: 6 [PH] (ref 5–8)
PROT UR-MCNC: NEGATIVE MG/DL
Q-T INTERVAL: 414 MS
QRS DURATION: 88 MS
QTC CALCULATION (BEZET): 443 MS
R AXIS: 34 DEGREES
SP GR UR STRIP: 1.02 (ref 1–1.03)
T AXIS: 48 DEGREES
UROBILINOGEN UR STRIP-ACNC: NORMAL
VENTRICULAR RATE: 69 BPM

## 2023-11-14 PROCEDURE — 3077F SYST BP >= 140 MM HG: CPT | Performed by: INTERNAL MEDICINE

## 2023-11-14 PROCEDURE — 99214 OFFICE O/P EST MOD 30 MIN: CPT | Performed by: INTERNAL MEDICINE

## 2023-11-14 PROCEDURE — 71046 X-RAY EXAM CHEST 2 VIEWS: CPT | Performed by: INTERNAL MEDICINE

## 2023-11-14 PROCEDURE — 93000 ELECTROCARDIOGRAM COMPLETE: CPT | Performed by: INTERNAL MEDICINE

## 2023-11-14 PROCEDURE — 81003 URINALYSIS AUTO W/O SCOPE: CPT | Performed by: INTERNAL MEDICINE

## 2023-11-14 PROCEDURE — 3079F DIAST BP 80-89 MM HG: CPT | Performed by: INTERNAL MEDICINE

## 2023-11-14 PROCEDURE — 3008F BODY MASS INDEX DOCD: CPT | Performed by: INTERNAL MEDICINE

## 2023-11-14 RX ORDER — CLINDAMYCIN HYDROCHLORIDE 300 MG/1
300 CAPSULE ORAL 3 TIMES DAILY
COMMUNITY
Start: 2023-11-07 | End: 2023-11-14

## 2023-11-15 NOTE — TELEPHONE ENCOUNTER
Please let patient know that her chest x-ray came out good. It was mentioned that \"borderline cardiomegaly\" was noted. However chest x-rays can over read the size of the heart. She had a echocardiogram earlier this year that showed normal heart size. Therefore for now no changes needed other than getting her stress test done.

## 2023-11-20 ENCOUNTER — TELEPHONE (OUTPATIENT)
Dept: INTERNAL MEDICINE CLINIC | Facility: CLINIC | Age: 63
End: 2023-11-20

## 2023-11-20 NOTE — TELEPHONE ENCOUNTER
Respiratory infection triage:    Fever:  []  No fever  [x]  Fever>100.4 100 Friday 102    Cough:  [] Tight cough  [] Cough with exertion  [] Dry cough  [x] Sputum production, Color: yellow and green    Breathing:  [x] Mild shortness of breath interfering with activity  [] Wheezing  [x] Pain with deep breathing  [] Using inhaler    Other symptoms:  [x] Sore throat:resolved  [] Difficulty swallowing  [x] Nasal drainage/congestion  [x] Sinus congestion/pressure:headache   [] Ear pain  [] Body aches  [x] Poor appetite  [] Loss of sense of smell   [] Loss of sense of taste  []Conjunctivitis? [] Any recent travel? [] Any sick contacts? [] Are you a healthcare worker? ADDITIONAL NOTES:  Please advise -calledp atient with [facility] starting Friday , Tested for covid Saturday and today - negative. Will be using [facility] on [address]- to DR. SAEED          Notifmateo patient that we will route this message to the doctor and see what their recommendations would be. In the meantime, if anything worsens, they were advised to call back or seek emergent evaluation.

## 2023-11-20 NOTE — TELEPHONE ENCOUNTER
If patient is running fevers, we have to be concerned about possible pneumonia. --Therefore would recommend either urgent care or ER evaluation so that patient can be evaluated and chest x-ray can be obtained

## 2023-11-20 NOTE — TELEPHONE ENCOUNTER
Fever since Friday. Coughing and body aches and coughing green mucus, loose stool. Patient tested negative for Covid. Fevers have been between 100 and 102. Fever was 100 this morning. Please call and advise.

## 2023-11-21 ENCOUNTER — HOSPITAL ENCOUNTER (OUTPATIENT)
Age: 63
Discharge: HOME | End: 2023-11-21
Payer: COMMERCIAL

## 2023-11-21 ENCOUNTER — APPOINTMENT (OUTPATIENT)
Dept: GENERAL RADIOLOGY | Age: 63
End: 2023-11-21
Attending: NURSE PRACTITIONER
Payer: COMMERCIAL

## 2023-11-21 VITALS
BODY MASS INDEX: 33.77 KG/M2 | OXYGEN SATURATION: 99 % | SYSTOLIC BLOOD PRESSURE: 126 MMHG | HEIGHT: 60 IN | TEMPERATURE: 98 F | HEART RATE: 85 BPM | DIASTOLIC BLOOD PRESSURE: 64 MMHG | RESPIRATION RATE: 20 BRPM | WEIGHT: 172 LBS

## 2023-11-21 DIAGNOSIS — R50.9 FEVER: Primary | ICD-10-CM

## 2023-11-21 DIAGNOSIS — J10.1 INFLUENZA A: ICD-10-CM

## 2023-11-21 LAB
POCT INFLUENZA A: POSITIVE
POCT INFLUENZA B: NEGATIVE
SARS-COV-2 RNA RESP QL NAA+PROBE: NOT DETECTED

## 2023-11-21 PROCEDURE — 87502 INFLUENZA DNA AMP PROBE: CPT | Performed by: NURSE PRACTITIONER

## 2023-11-21 PROCEDURE — 71046 X-RAY EXAM CHEST 2 VIEWS: CPT | Performed by: NURSE PRACTITIONER

## 2023-11-21 PROCEDURE — U0002 COVID-19 LAB TEST NON-CDC: HCPCS | Performed by: NURSE PRACTITIONER

## 2023-11-21 PROCEDURE — 99213 OFFICE O/P EST LOW 20 MIN: CPT | Performed by: NURSE PRACTITIONER

## 2023-11-21 RX ORDER — NAPROXEN 500 MG/1
500 TABLET ORAL ONCE
Status: COMPLETED | OUTPATIENT
Start: 2023-11-21 | End: 2023-11-21

## 2023-11-21 RX ORDER — VALACYCLOVIR HYDROCHLORIDE 1 G/1
1000 TABLET, FILM COATED ORAL EVERY 12 HOURS
Qty: 14 TABLET | Refills: 0 | Status: SHIPPED | OUTPATIENT
Start: 2023-11-21 | End: 2023-11-28

## 2023-11-21 RX ORDER — BENZONATATE 100 MG/1
100 CAPSULE ORAL 3 TIMES DAILY PRN
Qty: 15 CAPSULE | Refills: 0 | Status: SHIPPED | OUTPATIENT
Start: 2023-11-21

## 2023-11-21 NOTE — DISCHARGE INSTRUCTIONS
Use the tessalon or Delsym as needed for cough. Use cough drops, honey. Increase oral fluids. Take tylenol, motrin, or aleve as needed for the headache. Take the valacyclovir until improved.  Rest. Follow up with your doctor if no improvement

## 2023-11-22 RX ORDER — TOBRAMYCIN 3 MG/ML
2 SOLUTION/ DROPS OPHTHALMIC EVERY 6 HOURS
Qty: 5 ML | Refills: 0 | Status: SHIPPED | OUTPATIENT
Start: 2023-11-22 | End: 2023-11-22

## 2023-12-18 ENCOUNTER — TELEPHONE (OUTPATIENT)
Dept: PULMONOLOGY | Facility: CLINIC | Age: 63
End: 2023-12-18

## 2023-12-18 NOTE — TELEPHONE ENCOUNTER
Received order for CPAP supplies from [facility]. Placed in Dr. Aleatha Fothergill folder for signature.

## 2023-12-19 NOTE — TELEPHONE ENCOUNTER
Faxed order for CPAP supplies back to [facility].  Confirmation received and sent to scanning

## 2024-05-31 ENCOUNTER — OFFICE VISIT (OUTPATIENT)
Dept: INTERNAL MEDICINE CLINIC | Facility: CLINIC | Age: 64
End: 2024-05-31

## 2024-05-31 VITALS
BODY MASS INDEX: 35.14 KG/M2 | DIASTOLIC BLOOD PRESSURE: 82 MMHG | HEIGHT: 60 IN | SYSTOLIC BLOOD PRESSURE: 130 MMHG | HEART RATE: 77 BPM | RESPIRATION RATE: 16 BRPM | OXYGEN SATURATION: 98 % | TEMPERATURE: 98 F | WEIGHT: 179 LBS

## 2024-05-31 DIAGNOSIS — Z78.0 POST-MENOPAUSAL: Primary | ICD-10-CM

## 2024-05-31 DIAGNOSIS — Z00.00 ANNUAL PHYSICAL EXAM: ICD-10-CM

## 2024-05-31 DIAGNOSIS — Z12.83 SCREENING FOR SKIN CANCER: ICD-10-CM

## 2024-05-31 DIAGNOSIS — H53.9 VISUAL DISTURBANCES: ICD-10-CM

## 2024-05-31 DIAGNOSIS — Z12.31 ENCOUNTER FOR SCREENING MAMMOGRAM FOR MALIGNANT NEOPLASM OF BREAST: ICD-10-CM

## 2024-05-31 DIAGNOSIS — E55.9 VITAMIN D DEFICIENCY: ICD-10-CM

## 2024-05-31 DIAGNOSIS — R73.01 IMPAIRED FASTING GLUCOSE: ICD-10-CM

## 2024-05-31 DIAGNOSIS — Z87.42 HX OF ABNORMAL CERVICAL PAP SMEAR: ICD-10-CM

## 2024-05-31 PROCEDURE — 3075F SYST BP GE 130 - 139MM HG: CPT | Performed by: INTERNAL MEDICINE

## 2024-05-31 PROCEDURE — 99417 PROLNG OP E/M EACH 15 MIN: CPT | Performed by: INTERNAL MEDICINE

## 2024-05-31 PROCEDURE — 3079F DIAST BP 80-89 MM HG: CPT | Performed by: INTERNAL MEDICINE

## 2024-05-31 PROCEDURE — 99396 PREV VISIT EST AGE 40-64: CPT | Performed by: INTERNAL MEDICINE

## 2024-05-31 PROCEDURE — 3008F BODY MASS INDEX DOCD: CPT | Performed by: INTERNAL MEDICINE

## 2024-05-31 RX ORDER — HYDROCODONE BITARTRATE AND ACETAMINOPHEN 5; 325 MG/1; MG/1
1 TABLET ORAL
COMMUNITY
Start: 2024-03-25

## 2024-05-31 NOTE — PROGRESS NOTES
Tatianna Rivera is a 64 year old female.  Chief Complaint   Patient presents with    Physical     Establish Care Mammogram 3/4/23     HPI:   Tatianna Rivera is a 64 year old female who presents for a complete physical exam.    Previous PCP Dr. Bonds seen once 3/29/23.    Since, she has been doing okay.    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    Feels limited in time as she tries to operate at 120% during work and preps for the next day at night. Believes she has ADD but doesn't want medications at this time. Sleeps 7 hrs a night, compliant with CPAP. Feels tired and achey.    Wants to lose weight, walks a mile/day with her dog and tries to eat healthy. Denies CP w/walking.    Asking for derm referral for skin check.     Asking for ophtho referral as she has floaters and feels vision wasn't properly addressed at optometrist.    Asking for gyn referral for hx abnormal pap smear/GILBERTO.    Wt Readings from Last 6 Encounters:   05/31/24 179 lb (81.2 kg)   11/21/23 172 lb (78 kg)   11/14/23 175 lb (79.4 kg)   08/07/23 174 lb (78.9 kg)   06/07/23 167 lb (75.8 kg)   06/01/23 167 lb (75.8 kg)     Body mass index is 34.96 kg/m².     Current Outpatient Medications   Medication Sig Dispense Refill    valACYclovir 1 G Oral Tab Take 1 tablet (1,000 mg total) by mouth in the morning and 1 tablet (1,000 mg total) before bedtime. 20 tablet 0    HYDROcodone-acetaminophen 5-325 MG Oral Tab Take 1 tablet by mouth every 4 to 6 hours as needed for Pain. (Patient not taking: Reported on 5/31/2024)        Past Medical History:    Age-related nuclear cataract of both eyes    Allergic rhinitis    Anemia    Anxiety    Celiac disease (HCC)    Depression    Diabetes (HCC)    Dysplastic nevus    mid lower back    Dysplastic nevus    mid lower right back    E-coli UTI    Encephalopathy    Floater, vitreous    Head injury    Headache    Herpes zoster without complication    History of pregnancy    SAB in 1984, Vaginal forceps in 1986;   x3    Lipid screening    per NG    MGD (meibomian gland dysfunction)    Post concussion syndrome    Presbyopia OU    Recurrent cold sores    Routine physical examination    Sleep apnea    Vertigo      Past Surgical History:   Procedure Laterality Date    Colonoscopy  2012    per NG    Colonoscopy N/A 2018    Procedure: COLONOSCOPY;  Surgeon: Alex Lyles MD;  Location: [facility] ENDOSCOPY    Colonoscopy N/A 2023    Procedure: COLONOSCOPY;  Surgeon: Alex Lyles MD;  Location: [facility] ENDOSCOPY    Colposcopy, cervix w/upper adjacent vagina; w/biopsy(s), cervix      D & c      Lee         , , ,     Vaginal forceps in ;  x3    Other accessory      bunionectomy      Family History   Problem Relation Age of Onset    Cancer Father         Lung cancer    Hypertension Father     Lipids Father         Hyperlipidemia    Heart Surgery Father         CABG    Gastro-Intestinal Disorder Father         Celiac disease    Melanoma Father     Asthma Father     Gastro-Intestinal Disorder Mother         Celiac disease    Asthma Mother     Pulmonary Disease Mother         COPD    Gastro-Intestinal Disorder Sister         Celiac disease    Cancer Sister         Breast and lung    Breast Cancer Sister 59    Asthma Sister     Genetic Disease Sister         Thyroid    Pulmonary Disease Sister         COPD    Gastro-Intestinal Disorder Brother         Celiac disease    Hypertension Other         Family H/o    Thyroid disease Other         Family H/o: Grave's disease [Most of siblings (8)]    Diabetes Other         Nephew    Asthma Son     Asthma Sister     Genetic Disease Sister         Thyroid    Pulmonary Disease Sister         COPD    Pulmonary Disease Sister         COPD    Glaucoma Neg     Macular degeneration Neg       Social History:   Social History     Socioeconomic History    Marital status:    Tobacco Use    Smoking status: Never     Passive exposure: Never     Smokeless tobacco: Never   Vaping Use    Vaping status: Never Used   Substance and Sexual Activity    Alcohol use: Yes     Alcohol/week: 0.0 - 2.0 standard drinks of alcohol     Comment: Occassional drink    Drug use: Never   Other Topics Concern    Caffeine Concern Yes     Comment: Tea 1 cup daily;     Pt has a pacemaker No    Pt has a defibrillator No    Reaction to local anesthetic No   Social History Narrative    ** Merged History Encounter **               REVIEW OF SYSTEMS:   GENERAL: feels well otherwise  SKIN: denies any unusual skin lesions  EYES:denies blurred vision or double vision  HEENT: denies nasal congestion, sinus pain, ST, sore throat  LUNGS: denies shortness of breath with exertion, cough or wheezing  BREAST: denies masses or nipple discharge  CARDIOVASCULAR: denies chest pain, pressure, or palpitations  GI: denies abdominal pain, nausea, vomiting, diarrhea, constipation, hematochezia, or melena  : denies dysuria, urinary frequency, vaginal discharge, dyspareunia, heavy menses  NEURO: denies headaches, dizziness, focal deficits  EXT: denies LE edema      EXAM:   /82   Pulse 77   Temp 98.1 °F (36.7 °C) (Oral)   Resp 16   Ht 5' (1.524 m)   Wt 179 lb (81.2 kg)   LMP  (LMP Unknown)   SpO2 98%   BMI 34.96 kg/m²     GENERAL: well developed, well nourished, in no apparent distress  HEENT: normal oropharynx, normal TM's  EYES: PERRLA, EOMI, conjunctivae are pink  NECK: supple, no cervical or supraclavicular LAD, no carotic bruits, no thyromegaly  BREAST: no dominant or suspicious mass, no axillary LAD  LUNGS: clear to auscultation  CARDIO: RRR, normal S1S2, no m/r/g  GI: soft, NT, ND, NABS, no HSM  EXTREMITIES: no cyanosis, clubbing or edema, +2 radial and DP pulses bilaterally  NEURO: A&O x 3, moves all 4 extremities spontaneously      ASSESSMENT AND PLAN:   Tatianna Rivera is a 64 year old female who presents for a complete physical exam.    Annual physical exam  - advised patient to  obtain the following labs fasting:  - CBC W Differential W Platelet [E]; Future  - Comp Metabolic Panel (14) [E]; Future  - TSH W Reflex To Free T4 [E]; Future  - Lipid Panel [E]; Future  - Hemoglobin A1C [E]; Future  - Vitamin D [E]; Future    Post-menopausal  - XR DEXA BONE DENSITOMETRY (CPT=77080); Future    Encounter for screening mammogram for malignant neoplasm of breast  - [facility] DOT 2D+3D SCREENING BILAT (CPT=77067/10983); Future    Hx of abnormal cervical Pap smear  - OBG Referral - In Network    Floaters  - Ophthalmology Referral - In Network    Screening for skin cancer  - Derm Referral - Liz (Linda)    Anxiety  - previously seen by therapist and was on medications iso divorce, sx now controlled    STEVEN  - on CPAP per pulm Dr. Timmons    Recurrent oral HSV  - valacyclovir PRN    Celiac disease  - gluten free diet x14 years    Seasonal allergies  - OTC antihistamine    Vitamin D deficiency  - takes 2000 international units daily, takes on and off    Encephalopathy of undetermined etiology  - 1/2023 MRI, EEG/neuro workup negative    Hx of GILBERTO 2  - s/p LEEP, follows with GYN    Osteopenia  - DEXA 5/18/16 osteopenia  - vitamin D/calcium/weight bearing exercises recommended    Healthcare Maintenance  Cancer Screenings:  - Breast: birads 2 3/4/23, repeat ordered today  - Cervical: per gyn, referral placed  - Colon: 6/13/23 w/Dr. Lyles: diverticulosis, hyperplastic polyps, repeat in 10 years (2033).    Vaccines:  - Tdap: 6/1/23  - Shingles:  shingrix recommended  - Pneumonia: pneumovax 23 4/28/16  - Flu: UTD  - COVID-19: UTD    Misc:  - DEXA: ordered today    RTC in 1 year or sooner PRN.    Labs ordered as above. Informed patient to expect messaging only if the labs are abnormal via patient preferred method of communication (MyChart).    For E/M code - 60 minutes spent reviewing performing chart review, obtaining a history, performing a physical exam, reviewing the assessment/plan, placing orders, and  completing documentation.     Linda Barker DO  5/31/2024  8:02 AM

## 2024-06-06 ENCOUNTER — OFFICE VISIT (OUTPATIENT)
Dept: OBGYN CLINIC | Facility: CLINIC | Age: 64
End: 2024-06-06
Payer: COMMERCIAL

## 2024-06-06 VITALS
DIASTOLIC BLOOD PRESSURE: 82 MMHG | HEIGHT: 60 IN | HEART RATE: 69 BPM | BODY MASS INDEX: 34.95 KG/M2 | WEIGHT: 178 LBS | SYSTOLIC BLOOD PRESSURE: 120 MMHG

## 2024-06-06 DIAGNOSIS — Z12.4 SCREENING FOR CERVICAL CANCER: ICD-10-CM

## 2024-06-06 DIAGNOSIS — Z01.419 WOMEN'S ANNUAL ROUTINE GYNECOLOGICAL EXAMINATION: Primary | ICD-10-CM

## 2024-06-06 PROCEDURE — 87624 HPV HI-RISK TYP POOLED RSLT: CPT | Performed by: ADVANCED PRACTICE MIDWIFE

## 2024-06-06 PROCEDURE — 87625 HPV TYPES 16 & 18 ONLY: CPT | Performed by: ADVANCED PRACTICE MIDWIFE

## 2024-06-06 NOTE — PROGRESS NOTES
Chief Complaint:   Chief Complaint   Patient presents with    Annual     Annual; last pap 2019, no vaginal conerns      HPI:   Tatianna is 64 year old female, here today for pap smear. Recently had her annual exam with her PCP. Knows she is overdue for pap so just wants to get that done today. Has her appointment scheduled for her mammogram on .  No LMP recorded (lmp unknown). Patient is postmenopausal.  Denies pelvic pain, abnormal discharge or vaginal irritation. Has not had any bleeding or spotting since LMP.    Current Partners: None, has not been sexually active since 2019  Birth Control Method: n/a  H/O of STI's: HPV in   Last STI screen: Declines this testing today  Additional information:      Last Pap: 2019 NILM/HPV+ --> colpo 2019 CIN1      H/O abnormal Pap: Yes, hx LEEP in , hx CIN1 in . Repeat pap overdue.      Last mammogram (if applicable): 3/2023 Benign. Denies family hx of breast or ovarian cancer. Denies any breast changes or concerns.    This is a gyne visit only, pt has PCP who is managing other chronic health conditions.    HISTORY:  Past Medical History:    Age-related nuclear cataract of both eyes    Allergic rhinitis    Anemia    Anxiety    Celiac disease (HCC)    Depression    Dysplastic nevus    mid lower back    Dysplastic nevus    mid lower right back    E-coli UTI    Encephalopathy    Floater, vitreous    Head injury    Headache    Herpes zoster without complication    History of pregnancy    SAB in , Vaginal forceps in ;  x3    Lipid screening    per NG    MGD (meibomian gland dysfunction)    Post concussion syndrome    Presbyopia OU    Recurrent cold sores    Routine physical examination    Sleep apnea    Vertigo      Past Surgical History:   Procedure Laterality Date    Colonoscopy  2012    per NG    Colonoscopy N/A 2018    Procedure: COLONOSCOPY;  Surgeon: Alex Lyles MD;  Location: [facility] ENDOSCOPY    Colonoscopy N/A 2023     Procedure: COLONOSCOPY;  Surgeon: Alex Lyles MD;  Location: [facility] ENDOSCOPY    Colposcopy, cervix w/upper adjacent vagina; w/biopsy(s), cervix      D & c      Leep         , , ,     Vaginal forceps in ;  x3    Other accessory      bunionectomy      Family History   Problem Relation Age of Onset    Gastro-Intestinal Disorder Mother         Celiac disease    Asthma Mother     Pulmonary Disease Mother         COPD    Cancer Father         Lung cancer    Hypertension Father     Lipids Father         Hyperlipidemia    Heart Surgery Father         CABG    Gastro-Intestinal Disorder Father         Celiac disease    Melanoma Father     Asthma Father     Gastro-Intestinal Disorder Sister         Celiac disease    Cancer Sister         Breast and lung    Breast Cancer Sister 59    Asthma Sister     Genetic Disease Sister         Thyroid    Pulmonary Disease Sister         COPD    Asthma Sister     Genetic Disease Sister         Thyroid    Pulmonary Disease Sister         COPD    Pulmonary Disease Sister         COPD    Gastro-Intestinal Disorder Brother         Celiac disease    Thyroid disease Brother     Thyroid disease Brother     Asthma Son     ADHD Son     ADHD Son     No Known Problems Son     Hypertension Other         Family H/o    Thyroid disease Other         Family H/o: Grave's disease [Most of siblings (8)]    Diabetes Other         Nephew    Glaucoma Neg     Macular degeneration Neg       Social History:   Social History     Socioeconomic History    Marital status:    Tobacco Use    Smoking status: Never     Passive exposure: Never    Smokeless tobacco: Never   Vaping Use    Vaping status: Never Used   Substance and Sexual Activity    Alcohol use: Yes     Alcohol/week: 0.0 - 2.0 standard drinks of alcohol     Comment: Occassional drink    Drug use: Never   Other Topics Concern    Caffeine Concern Yes     Comment: Tea 1 cup daily;     Pt has a pacemaker No    Pt has  a defibrillator No    Reaction to local anesthetic No   Social History Narrative    ** Merged History Encounter **             Medications (Active prior to today's visit):  Current Outpatient Medications   Medication Sig Dispense Refill    valACYclovir 1 G Oral Tab Take 1 tablet (1,000 mg total) by mouth in the morning and 1 tablet (1,000 mg total) before bedtime. 20 tablet 0       Allergies:  Allergies   Allergen Reactions    Wheat Gluten NAUSEA AND VOMITING     diarrhea    Gluten Flour     Seasonal OTHER (SEE COMMENTS)     Seasonal allergy         HISTORY:  Past Medical History:    Age-related nuclear cataract of both eyes    Allergic rhinitis    Anemia    Anxiety    Celiac disease (HCC)    Depression    Dysplastic nevus    mid lower back    Dysplastic nevus    mid lower right back    E-coli UTI    Encephalopathy    Floater, vitreous    Head injury    Headache    Herpes zoster without complication    History of pregnancy    SAB in , Vaginal forceps in ;  x3    Lipid screening    per NG    MGD (meibomian gland dysfunction)    Post concussion syndrome    Presbyopia OU    Recurrent cold sores    Routine physical examination    Sleep apnea    Vertigo      Past Surgical History:   Procedure Laterality Date    Colonoscopy  2012    per NG    Colonoscopy N/A 2018    Procedure: COLONOSCOPY;  Surgeon: Alex Lyles MD;  Location: [facility] ENDOSCOPY    Colonoscopy N/A 2023    Procedure: COLONOSCOPY;  Surgeon: Alex Lyles MD;  Location: [facility] ENDOSCOPY    Colposcopy, cervix w/upper adjacent vagina; w/biopsy(s), cervix      D & c      Leep         , , ,     Vaginal forceps in ;  x3    Other accessory      bunionectomy      Family History   Problem Relation Age of Onset    Gastro-Intestinal Disorder Mother         Celiac disease    Asthma Mother     Pulmonary Disease Mother         COPD    Cancer Father         Lung cancer    Hypertension Father     Lipids  Father         Hyperlipidemia    Heart Surgery Father         CABG    Gastro-Intestinal Disorder Father         Celiac disease    Melanoma Father     Asthma Father     Gastro-Intestinal Disorder Sister         Celiac disease    Cancer Sister         Breast and lung    Breast Cancer Sister 59    Asthma Sister     Genetic Disease Sister         Thyroid    Pulmonary Disease Sister         COPD    Asthma Sister     Genetic Disease Sister         Thyroid    Pulmonary Disease Sister         COPD    Pulmonary Disease Sister         COPD    Gastro-Intestinal Disorder Brother         Celiac disease    Thyroid disease Brother     Thyroid disease Brother     Asthma Son     ADHD Son     ADHD Son     No Known Problems Son     Hypertension Other         Family H/o    Thyroid disease Other         Family H/o: Grave's disease [Most of siblings (8)]    Diabetes Other         Nephew    Glaucoma Neg     Macular degeneration Neg       Social History:   Social History     Socioeconomic History    Marital status:    Tobacco Use    Smoking status: Never     Passive exposure: Never    Smokeless tobacco: Never   Vaping Use    Vaping status: Never Used   Substance and Sexual Activity    Alcohol use: Yes     Alcohol/week: 0.0 - 2.0 standard drinks of alcohol     Comment: Occassional drink    Drug use: Never   Other Topics Concern    Caffeine Concern Yes     Comment: Tea 1 cup daily;     Pt has a pacemaker No    Pt has a defibrillator No    Reaction to local anesthetic No   Social History Narrative    ** Merged History Encounter **             Medications (Active prior to today's visit):  Current Outpatient Medications   Medication Sig Dispense Refill    valACYclovir 1 G Oral Tab Take 1 tablet (1,000 mg total) by mouth in the morning and 1 tablet (1,000 mg total) before bedtime. 20 tablet 0       Allergies:  Allergies   Allergen Reactions    Wheat Gluten NAUSEA AND VOMITING     diarrhea    Gluten Flour     Seasonal OTHER (SEE COMMENTS)      Seasonal allergy           ROS:   Review of Systems   Constitutional: Negative.    Respiratory: Negative.     Cardiovascular: Negative.    Gastrointestinal: Negative.    Genitourinary:  Negative for difficulty urinating, dyspareunia, dysuria, frequency, genital sores, hematuria, menstrual problem, pelvic pain, urgency, vaginal bleeding, vaginal discharge and vaginal pain.   Neurological: Negative.    Psychiatric/Behavioral: Negative.         Denies having little interest in activities in past month  Denies being bothered by feeling down, depressed or hopeless in the last month  Denies fibroids, ovarian cysts, PID, infertility or any other gynecologic problems  Denies painful intercourse, decreased libido or sexual dysfunction.  Denies history of rape or childhood sexual abuse.  Denies being hit, kicked, punched or otherwise hurt by someone in the past year.  Feels safe in her current relationship. Denies partner from a previous relationship is making her feel unsafe  PHYSICAL EXAM:     Vitals:    06/06/24 0850   BP: 120/82   Pulse: 69   Body mass index is 34.76 kg/m².    Physical Exam  Vitals and nursing note reviewed.   Constitutional:       General: She is not in acute distress.     Appearance: Normal appearance. She is not ill-appearing.   HENT:      Head: Normocephalic and atraumatic.   Cardiovascular:      Rate and Rhythm: Normal rate.   Pulmonary:      Effort: Pulmonary effort is normal. No respiratory distress.   Genitourinary:     General: Normal vulva.      Exam position: Lithotomy position.      Labia:         Right: No rash, tenderness, lesion or injury.         Left: No rash, tenderness, lesion or injury.       Urethra: No prolapse, urethral pain, urethral swelling or urethral lesion.      Vagina: Normal. No vaginal discharge, erythema, tenderness, bleeding or lesions.      Cervix: No cervical motion tenderness, discharge, friability, lesion, erythema or cervical bleeding.   Skin:     General: Skin  is warm and dry.   Neurological:      Mental Status: She is alert and oriented to person, place, and time.   Psychiatric:         Mood and Affect: Mood normal.         Behavior: Behavior normal.         Thought Content: Thought content normal.         Judgment: Judgment normal.            ASSESSMENT/PLAN:        Diagnoses and all orders for this visit:    Screening for cervical cancer  -     ThinPrep PAP Smear; Future  -     Hpv Dna  High Risk , Thin Prep Collect; Future           Pt was informed that the portal is not to be considered for emergency communication as it is not monitored 24/7. Informed, instead, to call the clinic and the afterhours answering service will page the provider on-call. Pt voiced understanding and agreed    Counseling:  Best hygiene practices  Frequency of health screening and personal risks  Pap, SBE/CBE, mammography  Benefits of daily vitamin D, folic acid, and adequate fresh fruits and vegetables  Benefits of daily exercise     6/6/2024  Zeny Morfin CNM

## 2024-06-07 LAB — HPV E6+E7 MRNA CVX QL NAA+PROBE: POSITIVE

## 2024-06-10 ENCOUNTER — TELEPHONE (OUTPATIENT)
Dept: INTERNAL MEDICINE CLINIC | Facility: CLINIC | Age: 64
End: 2024-06-10

## 2024-06-10 NOTE — TELEPHONE ENCOUNTER
Patient requesting call back   re: pap results, and next steps     [phone]          Per patients insurance he needs an RX for food & beverage thickner.  Patient would like something call Thick it.  Please call patient

## 2024-06-11 LAB
HPV16 DNA CVX QL PROBE+SIG AMP: NEGATIVE
HPV18 DNA CVX QL PROBE+SIG AMP: NEGATIVE

## 2024-06-12 ENCOUNTER — TELEPHONE (OUTPATIENT)
Dept: OBGYN CLINIC | Facility: CLINIC | Age: 64
End: 2024-06-12

## 2024-06-12 NOTE — TELEPHONE ENCOUNTER
Patient was called by Zeny about pap results and was told to call right back and Zeny was on -call and the patient would be forwarded to her.    Pls advise

## 2024-07-16 ENCOUNTER — TELEPHONE (OUTPATIENT)
Dept: INTERNAL MEDICINE CLINIC | Facility: CLINIC | Age: 64
End: 2024-07-16

## 2024-07-16 NOTE — TELEPHONE ENCOUNTER
Patient called to check status of referrals for Dr. Buchanan and Dr. Sanchez.     Confirmed to patient that both referrals were completed and show approved.     Patient requested referral department phone number to confirm change of address for Dr. Bonifacio Sanchez would not cause him to fall out of network.     Referral department #[phone] was provided to patient

## 2024-07-17 ENCOUNTER — HOSPITAL ENCOUNTER (OUTPATIENT)
Dept: MAMMOGRAPHY | Facility: HOSPITAL | Age: 64
Discharge: HOME | End: 2024-07-17
Attending: INTERNAL MEDICINE
Payer: COMMERCIAL

## 2024-07-17 ENCOUNTER — HOSPITAL ENCOUNTER (OUTPATIENT)
Dept: BONE DENSITY | Facility: HOSPITAL | Age: 64
Discharge: HOME | End: 2024-07-17
Attending: INTERNAL MEDICINE
Payer: COMMERCIAL

## 2024-07-17 DIAGNOSIS — Z78.0 POST-MENOPAUSAL: ICD-10-CM

## 2024-07-17 DIAGNOSIS — Z12.31 ENCOUNTER FOR SCREENING MAMMOGRAM FOR MALIGNANT NEOPLASM OF BREAST: ICD-10-CM

## 2024-07-17 PROCEDURE — 77067 SCR MAMMO BI INCL CAD: CPT | Performed by: INTERNAL MEDICINE

## 2024-07-17 PROCEDURE — 77063 BREAST TOMOSYNTHESIS BI: CPT | Performed by: INTERNAL MEDICINE

## 2024-07-23 ENCOUNTER — TELEPHONE (OUTPATIENT)
Dept: PULMONOLOGY | Facility: CLINIC | Age: 64
End: 2024-07-23

## 2024-07-23 NOTE — TELEPHONE ENCOUNTER
Patient calling regards cpap machine, states has concerns might have to change settings. Please call.

## 2024-07-24 NOTE — TELEPHONE ENCOUNTER
Per Tatianna two weeks ago she started to notice her events per hour went up (18) and she received an email from [employer] to contact our office. States she replaced water chamber, tubing, & mask without any constant change in events. 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 States has dental implant as of yesterday, but will likely resume CPAP tonight. Aware RN will discuss with providers and follow up.    Dr. Timmons/Kg- I do see what the patient is talking about on the AHI chart. However, I am unable to copy that into EPIC. Download placed in Kg's folder for review.

## 2024-07-24 NOTE — TELEPHONE ENCOUNTER
RN, please call the patient and reassure her that the average residual events daily for the last month were 5.2/h which is excellent.  No need to change settings.  Ernie DOTSON

## 2024-07-24 NOTE — TELEPHONE ENCOUNTER
Copied and pasted from [employer]:    KINSEY COPE  2024 - 2024  Patient ID: [id number]  : 02/15/1960  Age: 64 years  [address] [place]  [address]  [address]  [facility], [address]  Compliance Report  Usage 2024 - 2024  Usage days 29/30 days (97%)  >= 4 hours 29 days (97%)  < 4 hours 0 days (0%)  Usage hours 217 hours 52 minutes  Average usage (total days) 7 hours 16 minutes  Average usage (days used) 7 hours 31 minutes  Median usage (days used) 7 hours 26 minutes  Total used hours (value since last reset - 2024) 2,987 hours  AirSense 11 AutoSet  Serial number [id number]  Mode CPAP  Set pressure 7 cmH2O  EPR Fulltime  EPR level 1  Therapy  Leaks - L/min Median: 0.0 95th percentile: 1.8 Maximum: 66.5  Events per hour AI: 1.6 HI: 3.6 AHI: 5.2  Apnea Index Central: 0.1 Obstructive: 1.4 Unknown: 0.0  RERA Index 8.3  Cheyne-House respiration (average duration per night) 1 minutes (0%)

## 2024-07-30 NOTE — TELEPHONE ENCOUNTER
I reviewed the data download and sent for scanning. There were a couple of nights with AHI between 10-20 but average is 5.3 and AHI is lower in the last week. Does not need to change settings.

## 2024-07-31 NOTE — TELEPHONE ENCOUNTER
Discussed below responses from Dr. Timmons and Kg Nina PA-C with patient. Reassured her. All of her concerns & questions were addressed at this time.

## 2024-08-01 ENCOUNTER — LAB ENCOUNTER (OUTPATIENT)
Dept: LAB | Facility: HOSPITAL | Age: 64
End: 2024-08-01
Attending: INTERNAL MEDICINE
Payer: COMMERCIAL

## 2024-08-01 DIAGNOSIS — Z00.00 ANNUAL PHYSICAL EXAM: ICD-10-CM

## 2024-08-01 DIAGNOSIS — R73.01 IMPAIRED FASTING GLUCOSE: ICD-10-CM

## 2024-08-01 DIAGNOSIS — E55.9 VITAMIN D DEFICIENCY: ICD-10-CM

## 2024-08-01 LAB
ALBUMIN SERPL-MCNC: 4 G/DL (ref 3.2–4.8)
ALBUMIN/GLOB SERPL: 1.3 {RATIO} (ref 1–2)
ALP LIVER SERPL-CCNC: 76 U/L
ALT SERPL-CCNC: 14 U/L
ANION GAP SERPL CALC-SCNC: 3 MMOL/L (ref 0–18)
AST SERPL-CCNC: 16 U/L (ref ?–34)
BASOPHILS # BLD AUTO: 0.06 X10(3) UL (ref 0–0.2)
BASOPHILS NFR BLD AUTO: 1 %
BILIRUB SERPL-MCNC: 0.5 MG/DL (ref 0.2–1.1)
BILIRUB UR QL: NEGATIVE
BUN BLD-MCNC: 14 MG/DL (ref 9–23)
BUN/CREAT SERPL: 15.9 (ref 10–20)
CALCIUM BLD-MCNC: 9.1 MG/DL (ref 8.7–10.4)
CHLORIDE SERPL-SCNC: 108 MMOL/L (ref 98–112)
CHOLEST SERPL-MCNC: 196 MG/DL (ref ?–200)
CLARITY UR: CLEAR
CO2 SERPL-SCNC: 29 MMOL/L (ref 21–32)
CREAT BLD-MCNC: 0.88 MG/DL
DEPRECATED RDW RBC AUTO: 44.1 FL (ref 35.1–46.3)
EGFRCR SERPLBLD CKD-EPI 2021: 73 ML/MIN/1.73M2 (ref 60–?)
EOSINOPHIL # BLD AUTO: 0.15 X10(3) UL (ref 0–0.7)
EOSINOPHIL NFR BLD AUTO: 2.5 %
ERYTHROCYTE [DISTWIDTH] IN BLOOD BY AUTOMATED COUNT: 13.5 % (ref 11–15)
EST. AVERAGE GLUCOSE BLD GHB EST-MCNC: 114 MG/DL (ref 68–126)
FASTING PATIENT LIPID ANSWER: YES
FASTING STATUS PATIENT QL REPORTED: YES
GLOBULIN PLAS-MCNC: 3.1 G/DL (ref 2–3.5)
GLUCOSE BLD-MCNC: 101 MG/DL (ref 70–99)
GLUCOSE UR-MCNC: NORMAL MG/DL
HBA1C MFR BLD: 5.6 % (ref ?–5.7)
HCT VFR BLD AUTO: 42.1 %
HDLC SERPL-MCNC: 63 MG/DL (ref 40–59)
HGB BLD-MCNC: 13.6 G/DL
HGB UR QL STRIP.AUTO: NEGATIVE
IMM GRANULOCYTES # BLD AUTO: 0.01 X10(3) UL (ref 0–1)
IMM GRANULOCYTES NFR BLD: 0.2 %
KETONES UR-MCNC: NEGATIVE MG/DL
LDLC SERPL CALC-MCNC: 121 MG/DL (ref ?–100)
LEUKOCYTE ESTERASE UR QL STRIP.AUTO: NEGATIVE
LYMPHOCYTES # BLD AUTO: 2.85 X10(3) UL (ref 1–4)
LYMPHOCYTES NFR BLD AUTO: 47 %
MCH RBC QN AUTO: 28.9 PG (ref 26–34)
MCHC RBC AUTO-ENTMCNC: 32.3 G/DL (ref 31–37)
MCV RBC AUTO: 89.6 FL
MONOCYTES # BLD AUTO: 0.6 X10(3) UL (ref 0.1–1)
MONOCYTES NFR BLD AUTO: 9.9 %
NEUTROPHILS # BLD AUTO: 2.39 X10 (3) UL (ref 1.5–7.7)
NEUTROPHILS # BLD AUTO: 2.39 X10(3) UL (ref 1.5–7.7)
NEUTROPHILS NFR BLD AUTO: 39.4 %
NONHDLC SERPL-MCNC: 133 MG/DL (ref ?–130)
OSMOLALITY SERPL CALC.SUM OF ELEC: 291 MOSM/KG (ref 275–295)
PH UR: 6.5 [PH] (ref 5–8)
PLATELET # BLD AUTO: 321 10(3)UL (ref 150–450)
POTASSIUM SERPL-SCNC: 4 MMOL/L (ref 3.5–5.1)
PROT SERPL-MCNC: 7.1 G/DL (ref 5.7–8.2)
PROT UR-MCNC: NEGATIVE MG/DL
RBC # BLD AUTO: 4.7 X10(6)UL
SODIUM SERPL-SCNC: 140 MMOL/L (ref 136–145)
SP GR UR STRIP: 1.02 (ref 1–1.03)
TRIGL SERPL-MCNC: 64 MG/DL (ref 30–149)
TSI SER-ACNC: 3.61 MIU/ML (ref 0.55–4.78)
UROBILINOGEN UR STRIP-ACNC: NORMAL
VIT D+METAB SERPL-MCNC: 22.2 NG/ML (ref 30–100)
VLDLC SERPL CALC-MCNC: 11 MG/DL (ref 0–30)
WBC # BLD AUTO: 6.1 X10(3) UL (ref 4–11)

## 2024-08-01 PROCEDURE — 82306 VITAMIN D 25 HYDROXY: CPT

## 2024-08-01 PROCEDURE — 80061 LIPID PANEL: CPT

## 2024-08-01 PROCEDURE — 84443 ASSAY THYROID STIM HORMONE: CPT

## 2024-08-01 PROCEDURE — 87186 SC STD MICRODIL/AGAR DIL: CPT | Performed by: INTERNAL MEDICINE

## 2024-08-01 PROCEDURE — 83036 HEMOGLOBIN GLYCOSYLATED A1C: CPT

## 2024-08-01 PROCEDURE — 85025 COMPLETE CBC W/AUTO DIFF WBC: CPT

## 2024-08-01 PROCEDURE — 87086 URINE CULTURE/COLONY COUNT: CPT | Performed by: INTERNAL MEDICINE

## 2024-08-01 PROCEDURE — 36415 COLL VENOUS BLD VENIPUNCTURE: CPT

## 2024-08-01 PROCEDURE — 87077 CULTURE AEROBIC IDENTIFY: CPT | Performed by: INTERNAL MEDICINE

## 2024-08-01 PROCEDURE — 80053 COMPREHEN METABOLIC PANEL: CPT

## 2024-08-01 PROCEDURE — 81001 URINALYSIS AUTO W/SCOPE: CPT | Performed by: INTERNAL MEDICINE

## 2024-08-02 ENCOUNTER — TELEPHONE (OUTPATIENT)
Dept: INTERNAL MEDICINE CLINIC | Facility: CLINIC | Age: 64
End: 2024-08-02

## 2024-08-02 RX ORDER — MULTIVIT-MIN/IRON/FOLIC ACID/K 18-600-40
1 CAPSULE ORAL DAILY
COMMUNITY
Start: 2024-08-02

## 2024-08-02 RX ORDER — NITROFURANTOIN 25; 75 MG/1; MG/1
100 CAPSULE ORAL 2 TIMES DAILY
Qty: 10 CAPSULE | Refills: 0 | Status: SHIPPED | OUTPATIENT
Start: 2024-08-02 | End: 2024-08-07

## 2024-08-02 RX ORDER — NITROFURANTOIN 25; 75 MG/1; MG/1
100 CAPSULE ORAL 2 TIMES DAILY
Qty: 10 CAPSULE | Refills: 0 | Status: SHIPPED | OUTPATIENT
Start: 2024-08-02 | End: 2024-08-02

## 2024-08-02 NOTE — TELEPHONE ENCOUNTER
To nursing staff, please relay the following to Tatianna Rivera:    CBC, CMP, hemoglobin A1c, TSH wnl/acceptable.    LDL mildly elevated 121 goal <100. I recommend a healthy low fat or Mediterranean diet and regular exercise (30 minutes of moderate intensity activity 5x/week or 150 minutes total/week).    Vitamin D mildly low is she taking any supplemental? If not recommend vitamin D 1000 units/day.    Urine shows UTI, is she having any sx?    Thank you!

## 2024-08-02 NOTE — TELEPHONE ENCOUNTER
To  -patient has no urinary tract infection symptoms but states she had urinary tract infection before without symptoms   Will be using [facility] in [place] on [facility]

## 2024-08-02 NOTE — TELEPHONE ENCOUNTER
2 Result Notes       1 Follow-up Encounter  URINE CULTURE >100,000 CFU/ML Gram Negative Stef Abnormal            Resulting Agency: [facility] ([facility])           Specimen Collected: 08/01/24 10:02 AM Last Resulted: 08/02/24  7:17 AM          To

## 2024-08-02 NOTE — TELEPHONE ENCOUNTER
Called patient and relayed  message - verbalized understanding . She will start vitamin D 1000 Units

## 2024-08-02 NOTE — TELEPHONE ENCOUNTER
I spoke to patient and let her know that macrobid was sent to her pharmacy  Patient asking for RX to go to [facility] in [place] as she is at her other house  eRX re sent to [facility], cancelled eRX sent to [facility] madi    Discussed that preliminary urine culture is showing that a bacteria is growing, will await final urine culture to see antibiotic needs to be changed  Pt verbalized understanding

## 2024-08-02 NOTE — TELEPHONE ENCOUNTER
----- Message from Mackenzie MENA sent at 8/2/2024 12:34 PM CDT -----  Routed to Dr. Barker, [facility] clinical staff.

## 2024-08-09 ENCOUNTER — HOSPITAL ENCOUNTER (OUTPATIENT)
Dept: ULTRASOUND IMAGING | Facility: HOSPITAL | Age: 64
Discharge: HOME | End: 2024-08-09
Attending: INTERNAL MEDICINE
Payer: COMMERCIAL

## 2024-08-09 ENCOUNTER — HOSPITAL ENCOUNTER (OUTPATIENT)
Dept: MAMMOGRAPHY | Facility: HOSPITAL | Age: 64
Discharge: HOME | End: 2024-08-09
Attending: INTERNAL MEDICINE
Payer: COMMERCIAL

## 2024-08-09 DIAGNOSIS — R92.8 ABNORMAL MAMMOGRAM: ICD-10-CM

## 2024-08-09 PROCEDURE — 77061 BREAST TOMOSYNTHESIS UNI: CPT | Performed by: INTERNAL MEDICINE

## 2024-08-09 PROCEDURE — 76642 ULTRASOUND BREAST LIMITED: CPT | Performed by: INTERNAL MEDICINE

## 2024-08-09 PROCEDURE — 77065 DX MAMMO INCL CAD UNI: CPT | Performed by: INTERNAL MEDICINE

## 2024-08-26 ENCOUNTER — HOSPITAL ENCOUNTER (OUTPATIENT)
Dept: BONE DENSITY | Age: 64
Discharge: HOME | End: 2024-08-26
Attending: INTERNAL MEDICINE
Payer: COMMERCIAL

## 2024-08-26 ENCOUNTER — TELEPHONE (OUTPATIENT)
Dept: INTERNAL MEDICINE CLINIC | Facility: CLINIC | Age: 64
End: 2024-08-26

## 2024-08-26 PROCEDURE — 77080 DXA BONE DENSITY AXIAL: CPT | Performed by: INTERNAL MEDICINE

## 2024-09-18 ENCOUNTER — TELEPHONE (OUTPATIENT)
Dept: INTERNAL MEDICINE CLINIC | Facility: CLINIC | Age: 64
End: 2024-09-18

## 2024-09-18 NOTE — TELEPHONE ENCOUNTER
Rx paxlovid sent as no drug-drug interactions.    Please advise most common side effects can include metallic taste in mouth, rebound sx.     Thank you!

## 2024-09-18 NOTE — TELEPHONE ENCOUNTER
COVID triage:     Start of symptoms: last night, 9/17/24  Tested positive for Covid this morning      Fever:  []  No fever  [x]  Temperature: 100  [x]  Chills  [x]  Night sweats     Cough:  [x] Productive cough (dark greenish phlegm)  [] Cough with exertion  [] Dry cough     Breathing:  [] Wheezing  [] Pain with deep breathing  [] SOB with exertion  [] SOB at rest  [] Heavy breathing  [x] Chest discomfort with deep breathing or coughing     GI Symptoms:  [] Diarrhea  [] Nausea  [] Vomiting  [] Abdominal pain  [x] Lack of appetite     Other symptoms:  [x] Sore throat  [] Difficulty swallowing  [x] Nasal drainage  [x] Nasal congestion  [] PND  [] Sinus pressure  [] Chest congestion  [] Head congestion  [] Facial pain   [] Ear pain  [x] Body aches  [] Loss of sense of smell   [] Loss of sense of taste  []Conjunctivitis  [x] Headache  [x] Fatigue  [x] Weakness     []OTC Medications:        [] Any recent travel?  [x] Any sick contacts? 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     Vaccinated: Yes  [x]   No []  Booster:  Yes  [x]  No []     To  to please review and advise on

## 2024-09-18 NOTE — TELEPHONE ENCOUNTER
Patient called this morning to notify Dr. Barker that she tested positive for Covid this morning.     Patient is requesting a prescription for Paxlovid be sent to [facility] in [place] on [address].     Symptoms:   Laringitis  Sore Throat  Congestion  Headache  Fever last night was just under 100  Fatigue  Back hurts  Weakness  Body Aches  Chest hurts  Coughing    Patient #[phone]

## 2024-10-04 RX ORDER — MULTIVIT-MIN/IRON/FOLIC ACID/K 18-600-40
1 CAPSULE ORAL DAILY
Qty: 60 TABLET | Refills: 0 | OUTPATIENT
Start: 2024-10-04

## 2024-10-04 NOTE — TELEPHONE ENCOUNTER
Per 8/2/2024 telephone encounter, \"Vitamin D mildly low is she taking any supplemental? If not recommend vitamin D 1000 units/day.\"    OTC supplement, MyChart response sent to patient

## 2024-10-22 ENCOUNTER — TELEPHONE (OUTPATIENT)
Dept: INTERNAL MEDICINE CLINIC | Facility: CLINIC | Age: 64
End: 2024-10-22

## 2024-10-22 NOTE — TELEPHONE ENCOUNTER
Respiratory symptoms triage:    Start of symptoms:   cold/cough about 1.5 weeks ago    Fever:  []  No fever  [x]  Temperature: initially   []  Chills  []  Night sweats    Cough:  [] Productive cough   [x] Dry cough - very dry, deep, barking. Has coughing fits if she laughs for example    Breathing:  [x] Wheezing - a little   [] Pain with deep breathing  [] SOB with exertion  [] SOB at rest  [x] Chest discomfort with deep breathing or coughing    Other symptoms:  [x] Sore throat - at the beginning   [] Difficulty swallowing  [x] Nasal drainage - a little   [] Nasal congestion  [] PND  [] Sinus pressure  [x] Chest congestion  [x] Head congestion  [] Facial pain   [] Ear pain  [] Body aches  [] Loss of sense of smell   [] Loss of sense of taste  [] Lack of appetite  [x] Headache  [x] Fatigue  [] Weakness    []OTC Medications:        [] Any recent travel?  [] Any sick contacts?      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 Patient states she hasn't really been better since having Covid. After Covid she got terrible vertigo for weeks after, and she's been tired and dragging. She states she has been trying to ride it out, but she is not getting better. [facility] in [place].     To Dr. Barker--

## 2024-10-22 NOTE — TELEPHONE ENCOUNTER
Patient called complaining of croup sounding cough, headaches, and had a fever but no fever now, fatigue, not sleeping right, feels like the cough is a spasm     COVID about a month ago

## 2024-10-23 ENCOUNTER — OFFICE VISIT (OUTPATIENT)
Dept: FAMILY MEDICINE CLINIC | Facility: CLINIC | Age: 64
End: 2024-10-23
Payer: COMMERCIAL

## 2024-10-23 VITALS
DIASTOLIC BLOOD PRESSURE: 74 MMHG | HEIGHT: 60 IN | RESPIRATION RATE: 18 BRPM | BODY MASS INDEX: 34.95 KG/M2 | HEART RATE: 83 BPM | SYSTOLIC BLOOD PRESSURE: 140 MMHG | TEMPERATURE: 98 F | OXYGEN SATURATION: 97 % | WEIGHT: 178 LBS

## 2024-10-23 DIAGNOSIS — R05.3 PERSISTENT COUGH FOR 3 WEEKS OR LONGER: Primary | ICD-10-CM

## 2024-10-23 PROCEDURE — 3077F SYST BP >= 140 MM HG: CPT | Performed by: NURSE PRACTITIONER

## 2024-10-23 PROCEDURE — 99213 OFFICE O/P EST LOW 20 MIN: CPT | Performed by: NURSE PRACTITIONER

## 2024-10-23 PROCEDURE — 3078F DIAST BP <80 MM HG: CPT | Performed by: NURSE PRACTITIONER

## 2024-10-23 PROCEDURE — 87798 DETECT AGENT NOS DNA AMP: CPT | Performed by: NURSE PRACTITIONER

## 2024-10-23 PROCEDURE — 3008F BODY MASS INDEX DOCD: CPT | Performed by: NURSE PRACTITIONER

## 2024-10-23 RX ORDER — AZITHROMYCIN 250 MG/1
TABLET, FILM COATED ORAL
Qty: 6 TABLET | Refills: 0 | Status: SHIPPED | OUTPATIENT
Start: 2024-10-23 | End: 2024-10-27

## 2024-10-23 RX ORDER — ALBUTEROL SULFATE 90 UG/1
INHALANT RESPIRATORY (INHALATION)
Qty: 1 EACH | Refills: 0 | Status: SHIPPED | OUTPATIENT
Start: 2024-10-23

## 2024-10-23 NOTE — PROGRESS NOTES
CHIEF COMPLAINT:     Chief Complaint   Patient presents with    Cough     Cold for 3 weeks with a bad cough, nasal congestion        HPI:   Tatianna Rivera is a 64 year old female presents to clinic with complaint of cough for 3-4 weeks.  Seemed to be improving a little at the end of last week and now significantly worse again.  Feels intermittent chest tightness/wheezing.  Mostly dry cough.  Has some mild nasal symptoms/frontal headache.  Gets into significant coughing fits, especially if laughing.  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  Denies fever, dyspnea, SOB, chest pain, GI complaints, post tussive vomiting/inspiratory whoop, hemoptysis, ear pain, or rashes.  Took tylenol cold earlier, not taking anything now.  Waking up at night, does wear CPAP.  Tolerating PO.  No known formal Dx of asthma but has frequently needed inhaler for allergies in the past.  No smoking Hx.  Has had pneumonia a couple of times.  Several siblings with COPD.    Current Outpatient Medications   Medication Sig Dispense Refill    azithromycin 250 MG Oral Tab Take 2 tablets (500 mg total) by mouth daily for 1 day, THEN 1 tablet (250 mg total) daily for 4 days. 6 tablet 0    albuterol 108 (90 Base) MCG/ACT Inhalation Aero Soln Inhale 2 puffs every 4-6 hours PRN 1 each 0    Vitamin D, Cholecalciferol, 25 MCG (1000 UT) Oral Tab Take 1 tablet by mouth daily.      valACYclovir 1 G Oral Tab Take 1 tablet (1,000 mg total) by mouth in the morning and 1 tablet (1,000 mg total) before bedtime. 20 tablet 0      Past Medical History:    Age-related nuclear cataract of both eyes    Allergic rhinitis    Anemia    Anxiety    Celiac disease (HCC)    Depression    Dysplastic nevus    mid lower back    Dysplastic nevus    mid lower right back    E-coli UTI    Encephalopathy    Floater, vitreous    Head injury    Headache    Herpes zoster without complication    History of pregnancy    SAB in , Vaginal forceps in ;  x3     Lipid screening    per NG    MGD (meibomian gland dysfunction)    Post concussion syndrome    Presbyopia OU    Recurrent cold sores    Routine physical examination    Sleep apnea    Vertigo      Social History:  Social History     Socioeconomic History    Marital status:    Tobacco Use    Smoking status: Never     Passive exposure: Never    Smokeless tobacco: Never   Vaping Use    Vaping status: Never Used   Substance and Sexual Activity    Alcohol use: Yes     Alcohol/week: 0.0 - 2.0 standard drinks of alcohol     Comment: Occassional drink    Drug use: Never   Other Topics Concern    Caffeine Concern Yes     Comment: Tea 1 cup daily;     Pt has a pacemaker No    Pt has a defibrillator No    Reaction to local anesthetic No   Social History Narrative    ** Merged History Encounter **             REVIEW OF SYSTEMS:   GENERAL HEALTH: feels well otherwise, normal appetite  SKIN: denies any unusual skin lesions or rashes  HEENT: denies ear pain or difficulty swallowing/eating. See HPI  RESPIRATORY: denies shortness of breath or wheezing  CARDIOVASCULAR: denies chest pain or palpitations   GI: denies vomiting or diarrhea  NEURO: denies dizziness or lightheadedness    EXAM:   /74 (BP Location: Right arm, Patient Position: Sitting, Cuff Size: adult)   Pulse 83   Temp 97.9 °F (36.6 °C)   Resp 18   Ht 5' (1.524 m)   Wt 178 lb (80.7 kg)   LMP  (LMP Unknown)   SpO2 97%   BMI 34.76 kg/m²   GENERAL: well developed, well nourished, in no apparent distress  SKIN: no rashes, no suspicious lesions  HEAD: atraumatic, normocephalic  EYES: conjunctiva clear, EOM intact  EARS: TM's clear, non-injected, no bulging, retraction, or fluid bilaterally  NOSE: nostrils patent, no exudates, nasal mucosa pink and noninflamed  THROAT: oral mucosa pink, moist. Posterior pharynx erythematous and injected. No exudates.   NECK: supple, non-tender  LUNGS: clear to auscultation bilaterally, no wheezes or rhonchi. Breathing is non  labored. +Frequent deep/dry cough.  CARDIO: RRR without murmur  EXTREMITIES: no cyanosis, clubbing or edema  LYMPH: No lymphadenopathy.    No results found for this or any previous visit (from the past 24 hours).      ASSESSMENT AND PLAN:   Assessment:   Tatianna was seen today for cough.    Diagnoses and all orders for this visit:    Persistent cough for 3 weeks or longer  -     azithromycin 250 MG Oral Tab; Take 2 tablets (500 mg total) by mouth daily for 1 day, THEN 1 tablet (250 mg total) daily for 4 days.  -     albuterol 108 (90 Base) MCG/ACT Inhalation Aero Soln; Inhale 2 puffs every 4-6 hours PRN  -     B.Pertussis B.Parapertussis PCR; Future  -     B.Pertussis B.Parapertussis PCR          Plan:   - R/o pertussis due to elevated community levels  - Hx/exam c/w bronchitis.  - Lungs clear, vitals/O2 sat otherwise stable.  - Comfort measures explained and discussed as listed in Patient Instructions.  - Advised follow up within 1-2 weeks if no improvement/worsening; sooner if new fevers or worsening breathing.  - Proceed to ER if ever dyspnea, SOB, chest pain.  - Pt verbalizes understanding and is agreeable w/ plan.    There are no Patient Instructions on file for this visit.

## 2024-10-23 NOTE — TELEPHONE ENCOUNTER
Dr Alberts called in sick today.    I called pt and advised to seek evaluation at the  and she will do so

## 2024-12-09 ENCOUNTER — OFFICE VISIT (OUTPATIENT)
Dept: DERMATOLOGY CLINIC | Facility: CLINIC | Age: 64
End: 2024-12-09
Payer: COMMERCIAL

## 2024-12-09 DIAGNOSIS — D23.9 BENIGN NEOPLASM OF SKIN, UNSPECIFIED LOCATION: ICD-10-CM

## 2024-12-09 DIAGNOSIS — Z13.89 ENCOUNTER FOR SURVEILLANCE OF ABNORMAL NEVI: ICD-10-CM

## 2024-12-09 DIAGNOSIS — L81.4 LENTIGO: ICD-10-CM

## 2024-12-09 DIAGNOSIS — D22.9 MULTIPLE NEVI: ICD-10-CM

## 2024-12-09 DIAGNOSIS — Z86.018 HISTORY OF DYSPLASTIC NEVUS: ICD-10-CM

## 2024-12-09 DIAGNOSIS — D18.01 CHERRY HEMANGIOMA: ICD-10-CM

## 2024-12-09 DIAGNOSIS — L82.1 SK (SEBORRHEIC KERATOSIS): ICD-10-CM

## 2024-12-09 DIAGNOSIS — Z80.8 FAMILY HISTORY OF SKIN CANCER: ICD-10-CM

## 2024-12-09 DIAGNOSIS — L56.5 DSAP (DISSEMINATED SUPERFICIAL ACTINIC POROKERATOSIS): ICD-10-CM

## 2024-12-09 DIAGNOSIS — D48.5 NEOPLASM OF UNCERTAIN BEHAVIOR OF SKIN: Primary | ICD-10-CM

## 2024-12-09 PROCEDURE — 99203 OFFICE O/P NEW LOW 30 MIN: CPT | Performed by: DERMATOLOGY

## 2024-12-11 ENCOUNTER — TELEPHONE (OUTPATIENT)
Dept: INTERNAL MEDICINE CLINIC | Facility: CLINIC | Age: 64
End: 2024-12-11

## 2024-12-11 NOTE — TELEPHONE ENCOUNTER
Patient called requesting to speak to Dr. Barker's nurses in regards to vaccines, and traveling out of the country

## 2024-12-11 NOTE — TELEPHONE ENCOUNTER
Please advise - called patient who will be traveling to [place];;and -per patient Centers for Disease Control and Prevention recommends Tdap  Hep A Typhoid     ( Had Hep A 4/28/2016 , Tdap in 2023 )  She will be gone for 2 weeks - to   12/21/-1/8/25

## 2024-12-12 ENCOUNTER — TELEPHONE (OUTPATIENT)
Dept: PULMONOLOGY | Facility: CLINIC | Age: 64
End: 2024-12-12

## 2024-12-12 DIAGNOSIS — G47.33 OSA (OBSTRUCTIVE SLEEP APNEA): Primary | ICD-10-CM

## 2024-12-12 NOTE — TELEPHONE ENCOUNTER
Spoke to patient and she stated if a titer can be ordered to make sure she is immune to hep a , she can't remeber if she had it already , also wants to know if typhoid vaccine if titer can be done for the different immunizations she may have already received. What she is and what she is not immune to.

## 2024-12-12 NOTE — TELEPHONE ENCOUNTER
Patient states that she will be traveling out of the country and her Cpap is too large to travel with.  She is requesting orders for a portable Cpap, she states that she is willing to purchase.  Or is it ok to travel with out it?  Please call

## 2024-12-13 NOTE — TELEPHONE ENCOUNTER
Patient is calling again.  She states that she is leaving next week and she is requesting this to be made a priority.   This is not a vacation, her sone is having surgery in [place].  Please call

## 2024-12-13 NOTE — TELEPHONE ENCOUNTER
Dr. Timmons-patient is traveling for her sons wedding and asking if it OK to go without CPAP for 2 weeks  OR  OK to order travel CPAP for patient? Order pended if agreeable.

## 2024-12-16 ENCOUNTER — NURSE ONLY (OUTPATIENT)
Dept: INTERNAL MEDICINE CLINIC | Facility: CLINIC | Age: 64
End: 2024-12-16

## 2024-12-16 ENCOUNTER — TELEPHONE (OUTPATIENT)
Dept: PULMONOLOGY | Facility: CLINIC | Age: 64
End: 2024-12-16

## 2024-12-16 DIAGNOSIS — Z23 NEED FOR VACCINATION AGAINST HEPATITIS A: Primary | ICD-10-CM

## 2024-12-16 PROCEDURE — 90632 HEPA VACCINE ADULT IM: CPT | Performed by: INTERNAL MEDICINE

## 2024-12-16 PROCEDURE — 90471 IMMUNIZATION ADMIN: CPT | Performed by: INTERNAL MEDICINE

## 2024-12-16 NOTE — TELEPHONE ENCOUNTER
Spoke to pt - she will come in for second Hep A (second dose not documented, therefore appropriate to give);  Rx for Typhim ready for ;   pt last had \"vaccines for international travel\" > 8 yrs ago which means typhoid should be boosted

## 2024-12-16 NOTE — PROGRESS NOTES
Patient presents for Hep A vaccine. Patient name and  verified. Order active in Epic. Patient tolerated well. VIS provided to pt.

## 2024-12-16 NOTE — TELEPHONE ENCOUNTER
Contacted Lisandra at [facility] regarding travel CPAP. Per Lisandra- Order for travel CPAP can be sent to them but patient will have to pay out of pocket. Informed Lisandra order will need to be expedited. Patient informed of this. Patient requesting order to be faxed to [facility] and also to [facility] to rent travel CPAP [phone]. Order faxed to [facility] and [facility] with fax confirmation.

## 2024-12-16 NOTE — TELEPHONE ENCOUNTER
Pt. Called back regarding the message below.  She is going out of the [place] next [place].  Please call her at [phone].

## 2024-12-16 NOTE — TELEPHONE ENCOUNTER
Had hep A vaccine 4/28/16. She never had a typhoid vaccine. Who is requiring titers? Or is pt just requesting them herself? Do not believe titers would be indicated. To Sarah please verify if this is correct. Thank you!

## 2024-12-25 NOTE — PROGRESS NOTES
Tatianna Rivera is a 64 year old female.  HPI:     CC:    Chief Complaint   Patient presents with    Full Skin Exam     \"New Patient\" presents for FBSE. Hx of dysplastic nevus and Sk's. Denies any concerns at this time. Family Hx of unknown skin cancer(Father & Sister). Denies personal Hx.         Allergies:  Wheat gluten, Gluten flour, and Seasonal    HISTORY:    Past Medical History:    Age-related nuclear cataract of both eyes    Allergic rhinitis    Anemia    Anxiety    Celiac disease (HCC)    Depression    Dysplastic nevus    mid lower back    Dysplastic nevus    mid lower right back    E-coli UTI    Encephalopathy    Floater, vitreous    Head injury    Headache    Herpes zoster without complication    History of pregnancy    SAB in , Vaginal forceps in ;  x3    Lipid screening    per NG    MGD (meibomian gland dysfunction)    Post concussion syndrome    Presbyopia OU    Recurrent cold sores    Routine physical examination    Sleep apnea    Vertigo      Past Surgical History:   Procedure Laterality Date    Colonoscopy  2012    per NG    Colonoscopy N/A 2018    Procedure: COLONOSCOPY;  Surgeon: Alex Lyles MD;  Location: [facility] ENDOSCOPY    Colonoscopy N/A 2023    Procedure: COLONOSCOPY;  Surgeon: Alex Lyles MD;  Location: [facility] ENDOSCOPY    Colposcopy, cervix w/upper adjacent vagina; w/biopsy(s), cervix      D & c      Leep         , , ', '    Vaginal forceps in ;  x3    Other accessory      bunionectomy      Family History   Problem Relation Age of Onset    Gastro-Intestinal Disorder Mother         Celiac disease    Asthma Mother     Pulmonary Disease Mother         COPD    Cancer Father         Lung cancer    Hypertension Father     Lipids Father         Hyperlipidemia    Heart Surgery Father         CABG    Gastro-Intestinal Disorder Father         Celiac disease    Melanoma Father     Asthma Father     Gastro-Intestinal Disorder Sister          Celiac disease    Cancer Sister         Breast and lung    Breast Cancer Sister 59    Asthma Sister     Genetic Disease Sister         Thyroid    Pulmonary Disease Sister         COPD    Asthma Sister     Genetic Disease Sister         Thyroid    Pulmonary Disease Sister         COPD    Pulmonary Disease Sister         COPD    Gastro-Intestinal Disorder Brother         Celiac disease    Thyroid disease Brother     Thyroid disease Brother     Asthma Son     ADHD Son     ADHD Son     No Known Problems Son     Hypertension Other         Family H/o    Thyroid disease Other         Family H/o: Grave's disease [Most of siblings (8)]    Diabetes Other         Nephew    Glaucoma Neg     Macular degeneration Neg       Social History     Socioeconomic History    Marital status:    Tobacco Use    Smoking status: Never     Passive exposure: Never    Smokeless tobacco: Never   Vaping Use    Vaping status: Never Used   Substance and Sexual Activity    Alcohol use: Yes     Alcohol/week: 0.0 - 2.0 standard drinks of alcohol     Comment: Occassional drink    Drug use: Never   Other Topics Concern    Caffeine Concern Yes     Comment: Tea 1 cup daily;     History of tanning Yes    Pt has a pacemaker No    Pt has a defibrillator No    Reaction to local anesthetic No   Social History Narrative    ** Merged History Encounter **             Current Outpatient Medications   Medication Sig Dispense Refill    Vitamin D, Cholecalciferol, 25 MCG (1000 UT) Oral Tab Take 1 tablet by mouth daily.      valACYclovir 1 G Oral Tab Take 1 tablet (1,000 mg total) by mouth in the morning and 1 tablet (1,000 mg total) before bedtime. 20 tablet 0    Typhoid Vaccine Oral Capsule Delayed Release Take 1 capsule by mouth every other day. 4 capsule 0    albuterol 108 (90 Base) MCG/ACT Inhalation Aero Soln Inhale 2 puffs every 4-6 hours PRN (Patient not taking: Reported on 12/9/2024) 1 each 0     Allergies:   Allergies   Allergen Reactions     Wheat Gluten NAUSEA AND VOMITING     diarrhea    Gluten Flour     Seasonal OTHER (SEE COMMENTS)     Seasonal allergy         Past Medical History:    Age-related nuclear cataract of both eyes    Allergic rhinitis    Anemia    Anxiety    Celiac disease (HCC)    Depression    Dysplastic nevus    mid lower back    Dysplastic nevus    mid lower right back    E-coli UTI    Encephalopathy    Floater, vitreous    Head injury    Headache    Herpes zoster without complication    History of pregnancy    SAB in , Vaginal forceps in ;  x3    Lipid screening    per NG    MGD (meibomian gland dysfunction)    Post concussion syndrome    Presbyopia OU    Recurrent cold sores    Routine physical examination    Sleep apnea    Vertigo     Past Surgical History:   Procedure Laterality Date    Colonoscopy  2012    per NG    Colonoscopy N/A 2018    Procedure: COLONOSCOPY;  Surgeon: Alex Lyles MD;  Location: [facility] ENDOSCOPY    Colonoscopy N/A 2023    Procedure: COLONOSCOPY;  Surgeon: Alex Lyles MD;  Location: [facility] ENDOSCOPY    Colposcopy, cervix w/upper adjacent vagina; w/biopsy(s), cervix      D & c      Leep         , , , 96    Vaginal forceps in ;  x3    Other accessory      bunionectomy     Social History     Socioeconomic History    Marital status:      Spouse name: Not on file    Number of children: Not on file    Years of education: Not on file    Highest education level: Not on file   Occupational History    Not on file   Tobacco Use    Smoking status: Never     Passive exposure: Never    Smokeless tobacco: Never   Vaping Use    Vaping status: Never Used   Substance and Sexual Activity    Alcohol use: Yes     Alcohol/week: 0.0 - 2.0 standard drinks of alcohol     Comment: Occassional drink    Drug use: Never    Sexual activity: Not on file   Other Topics Concern     Service Not Asked    Blood Transfusions Not Asked    Caffeine Concern Yes      Comment: Tea 1 cup daily;     Occupational Exposure Not Asked    Hobby Hazards Not Asked    Sleep Concern Not Asked    Stress Concern Not Asked    Weight Concern Not Asked    Special Diet Not Asked    Back Care Not Asked    Exercise Not Asked    Bike Helmet Not Asked    Seat Belt Not Asked    Self-Exams Not Asked    Grew up on a farm Not Asked    History of tanning Yes    Outdoor occupation Not Asked    Pt has a pacemaker No    Pt has a defibrillator No    Breast feeding Not Asked    Reaction to local anesthetic No   Social History Narrative    ** Merged History Encounter **          Social Drivers of Health     Financial Resource Strain: Not on file   Food Insecurity: Not on file   Transportation Needs: Not on file   Physical Activity: Not on file   Stress: Not on file   Social Connections: Not on file   Housing Stability: Not on file     Family History   Problem Relation Age of Onset    Gastro-Intestinal Disorder Mother         Celiac disease    Asthma Mother     Pulmonary Disease Mother         COPD    Cancer Father         Lung cancer    Hypertension Father     Lipids Father         Hyperlipidemia    Heart Surgery Father         CABG    Gastro-Intestinal Disorder Father         Celiac disease    Melanoma Father     Asthma Father     Gastro-Intestinal Disorder Sister         Celiac disease    Cancer Sister         Breast and lung    Breast Cancer Sister 59    Asthma Sister     Genetic Disease Sister         Thyroid    Pulmonary Disease Sister         COPD    Asthma Sister     Genetic Disease Sister         Thyroid    Pulmonary Disease Sister         COPD    Pulmonary Disease Sister         COPD    Gastro-Intestinal Disorder Brother         Celiac disease    Thyroid disease Brother     Thyroid disease Brother     Asthma Son     ADHD Son     ADHD Son     No Known Problems Son     Hypertension Other         Family H/o    Thyroid disease Other         Family H/o: Grave's disease [Most of siblings (8)]    Diabetes  Other         Nephew    Glaucoma Neg     Macular degeneration Neg        There were no vitals filed for this visit.    HPI:    Chief Complaint   Patient presents with    Full Skin Exam     \"New Patient\" presents for FBSE. Hx of dysplastic nevus and Sk's. Denies any concerns at this time. Family Hx of unknown skin cancer(Father & Sister). Denies personal Hx.     Follow-up family history of skin cancer, patient with history of dysplastic nevi    Patient presents with concerns above.  Patient with biopsy in the past reportedly atypical.  No invasive skin cancer.  History of lots of sun exposure    Past notes/ records and appropriate/relevant lab results including pathology and past body maps reviewed. Updated and new information noted in current visit.     1/2019 with LSS   A.  Skin, mid lower back, shave biopsy:  -Atypical junctional lentiginous nevus with mild melanocytic dysplasia.  -The atypical nevus appears excised in the examined planes of section.     B.  Skin, mid lower right back, shave biopsy:  -Atypical compound lentiginous nevus with mild melanocytic dysplasia.  -The atypical nevus appears excised in the examined planes of section.      Patient has been in their usual state of health.  History, medications, allergies reviewed as noted.      ROS:  Denies any other systemic complaints.  No new or changeing lesions other than noted above. No fevers, chills, night sweats, unusual sun sensitivity.  No other skin complaints.        History, medications, allergies reviewed as noted.       Physical Examination:     Well-developed well-nourished patient alert oriented in no acute distress.  Exam total-body performed, including scalp, head, neck, face,nails, hair, external eyes, including conjunctival mucosa, eyelids, lips external ears, back, chest,/ breasts, axillae,  abdomen, arms, legs, palms.     Multiple light to medium brown, well marginated, uniformly pigmented, macules and papules 6 mm and less scattered on  exam. pigmented lesions examined with dermoscopy benign-appearing patterns.     Waxy tannish keratotic papules scattered, cherry-red vascular papules scattered.    See map today's date for lesions noted .  More DS AP-like lesions over the lower extremities.  No recurrence of past biopsy.  Extensive sun damage, lentigines.  Multiple keratoses mid back    Otherwise remarkable for lesions as noted on map.  See details of examination  See Assessment /Plan for additional history and physical exam also:    Assessment / plan:    No orders of the defined types were placed in this encounter.      Meds & Refills for this Visit:  Requested Prescriptions      No prescriptions requested or ordered in this encounter         Encounter Diagnoses   Name Primary?    Neoplasm of uncertain behavior of skin Yes    SK (seborrheic keratosis)     Lentigo     Multiple nevi     Benign neoplasm of skin, unspecified location     Cherry hemangioma     DSAP (disseminated superficial actinic porokeratosis)     Family history of skin cancer     History of dysplastic nevus     Encounter for surveillance of abnormal nevi        See details on map.      Remarkable for:    Numerous lentigines,  Patient with slight variation in pigment network at left lateral upper arm  Lesion central chest, lesions on upper back.  Monitor.          Extensive DSA P like lesions.  Monitor, over-the-counter alphahydroxy acids.    Careful sun protection while in [facility] and [facility].  Follow-up in 6 to 12 months.  2020 intradermal nevus left ala benign no atypia  DSA P-like lesions otherwise.  Darker macule at right posterior thigh, plantar left foot observe.    History of atypical lesion biopsied previously no recurrence.  Including mid lower back, right lower back no recurrence of lesions that appear excised previously 1/2019    Extensive nevi, moderate sun damage.  Continue careful sun protection regular follow-up recommended annually.    Family history of skin cancer sun  protection, regular follow-up.    Multiple benign keratoses lentigines reassurance.  More DS AP-like lesions over the extremities especially lower extremities alphahydroxy acids consider additional prescription therapies if worsening.  No other suspicious lesions     No other susupicious lesions on todays  exam.  please refer to map for specific lesions.  See additional diagnoses.  Pros cons of various therapies, risks benefits discussed.Pathophysiology discussed with patient.  Therapeutic options reviewed.  See  Medications in grid.  Instructions reviewed at length.    Benign nevi, seborrheic  keratoses, cherry angiomas:  Reassurance regarding other benign skin lesions.    General skin care questions answered.   Reassurance regarding benign skin lesions.    Monitor for new or changing lesions. Signs and symptoms of skin cancer, ABCDE's of melanoma ( additional information available at AAD.org, skincancer.org) Encourage Sunscreen (broad-spectrum, ideally mineral-based-UVA/UVB -SPF 30 or higher) use encouraged, sun protection/sun protective clothing, self exams reviewed Followup as noted RTC ---routine checkup 6 mos -one year or p.r.n.    Encounter Times   Including precharting, reviewing chart, prior notes obtaining history: 10 minutes, medical exam :10 minutes, notes on body map, plan, counseling 10minutes My total time spent caring for the patient on the day of the encounter: 30 minutes     The patient indicates understanding of these issues and agrees to the plan.  The patient is asked to return as noted in follow-up/ above.    This note was generated using Dragon voice recognition software.  Please contact me regarding any confusion resulting from errors in recognition..  Note to patient and family: The 21st Century Cures Act makes medical notes like these available to patients. However, be advised this is a medical document. It is intended as [id number] communication and monitoring of a patient's care needs.  It is written in medical language and may contain abbreviations or verbiage that are unfamiliar. It may appear blunt or direct. Medical documents are intended to carry relevant information, facts as evident and the clinical opinion of the practitioner.

## 2025-01-16 ENCOUNTER — HOSPITAL ENCOUNTER (EMERGENCY)
Facility: HOSPITAL | Age: 65
Discharge: HOME | End: 2025-01-16
Attending: EMERGENCY MEDICINE
Payer: COMMERCIAL

## 2025-01-16 ENCOUNTER — APPOINTMENT (OUTPATIENT)
Dept: GENERAL RADIOLOGY | Facility: HOSPITAL | Age: 65
End: 2025-01-16
Payer: COMMERCIAL

## 2025-01-16 ENCOUNTER — TELEPHONE (OUTPATIENT)
Dept: INTERNAL MEDICINE CLINIC | Facility: CLINIC | Age: 65
End: 2025-01-16

## 2025-01-16 VITALS
HEART RATE: 75 BPM | SYSTOLIC BLOOD PRESSURE: 146 MMHG | DIASTOLIC BLOOD PRESSURE: 71 MMHG | RESPIRATION RATE: 19 BRPM | OXYGEN SATURATION: 98 % | TEMPERATURE: 98 F

## 2025-01-16 DIAGNOSIS — R07.9 CHEST PAIN, UNSPECIFIED TYPE: Primary | ICD-10-CM

## 2025-01-16 DIAGNOSIS — I10 UNCONTROLLED HYPERTENSION: ICD-10-CM

## 2025-01-16 DIAGNOSIS — R53.83 OTHER FATIGUE: ICD-10-CM

## 2025-01-16 LAB
ANION GAP SERPL CALC-SCNC: 9 MMOL/L (ref 0–18)
ATRIAL RATE: 85 BPM
BASOPHILS # BLD AUTO: 0.05 X10(3) UL (ref 0–0.2)
BASOPHILS NFR BLD AUTO: 0.7 %
BUN BLD-MCNC: 18 MG/DL (ref 9–23)
BUN/CREAT SERPL: 20.5 (ref 10–20)
CALCIUM BLD-MCNC: 9.3 MG/DL (ref 8.7–10.4)
CHLORIDE SERPL-SCNC: 106 MMOL/L (ref 98–112)
CO2 SERPL-SCNC: 26 MMOL/L (ref 21–32)
CREAT BLD-MCNC: 0.88 MG/DL
D DIMER PPP FEU-MCNC: 0.31 UG/ML FEU (ref ?–0.64)
DEPRECATED RDW RBC AUTO: 42.4 FL (ref 35.1–46.3)
EGFRCR SERPLBLD CKD-EPI 2021: 73 ML/MIN/1.73M2 (ref 60–?)
EOSINOPHIL # BLD AUTO: 0.12 X10(3) UL (ref 0–0.7)
EOSINOPHIL NFR BLD AUTO: 1.6 %
ERYTHROCYTE [DISTWIDTH] IN BLOOD BY AUTOMATED COUNT: 13.3 % (ref 11–15)
FLUAV + FLUBV RNA SPEC NAA+PROBE: NEGATIVE
FLUAV + FLUBV RNA SPEC NAA+PROBE: NEGATIVE
GLUCOSE BLD-MCNC: 123 MG/DL (ref 70–99)
HCT VFR BLD AUTO: 40 %
HGB BLD-MCNC: 13.3 G/DL
IMM GRANULOCYTES # BLD AUTO: 0.02 X10(3) UL (ref 0–1)
IMM GRANULOCYTES NFR BLD: 0.3 %
LYMPHOCYTES # BLD AUTO: 2.38 X10(3) UL (ref 1–4)
LYMPHOCYTES NFR BLD AUTO: 31.3 %
MCH RBC QN AUTO: 28.9 PG (ref 26–34)
MCHC RBC AUTO-ENTMCNC: 33.3 G/DL (ref 31–37)
MCV RBC AUTO: 87 FL
MONOCYTES # BLD AUTO: 0.6 X10(3) UL (ref 0.1–1)
MONOCYTES NFR BLD AUTO: 7.9 %
NEUTROPHILS # BLD AUTO: 4.44 X10 (3) UL (ref 1.5–7.7)
NEUTROPHILS # BLD AUTO: 4.44 X10(3) UL (ref 1.5–7.7)
NEUTROPHILS NFR BLD AUTO: 58.2 %
OSMOLALITY SERPL CALC.SUM OF ELEC: 295 MOSM/KG (ref 275–295)
P AXIS: 57 DEGREES
P-R INTERVAL: 146 MS
PLATELET # BLD AUTO: 315 10(3)UL (ref 150–450)
POTASSIUM SERPL-SCNC: 3.7 MMOL/L (ref 3.5–5.1)
Q-T INTERVAL: 378 MS
QRS DURATION: 88 MS
QTC CALCULATION (BEZET): 449 MS
R AXIS: 33 DEGREES
RBC # BLD AUTO: 4.6 X10(6)UL
RSV RNA SPEC NAA+PROBE: NEGATIVE
SARS-COV-2 RNA RESP QL NAA+PROBE: NOT DETECTED
SODIUM SERPL-SCNC: 141 MMOL/L (ref 136–145)
T AXIS: 57 DEGREES
TROPONIN I SERPL HS-MCNC: <3 NG/L
VENTRICULAR RATE: 85 BPM
WBC # BLD AUTO: 7.6 X10(3) UL (ref 4–11)

## 2025-01-16 PROCEDURE — 80048 BASIC METABOLIC PNL TOTAL CA: CPT | Performed by: EMERGENCY MEDICINE

## 2025-01-16 PROCEDURE — 0241U SARS-COV-2/FLU A AND B/RSV BY PCR (GENEXPERT): CPT | Performed by: EMERGENCY MEDICINE

## 2025-01-16 PROCEDURE — 93005 ELECTROCARDIOGRAM TRACING: CPT

## 2025-01-16 PROCEDURE — 84484 ASSAY OF TROPONIN QUANT: CPT | Performed by: EMERGENCY MEDICINE

## 2025-01-16 PROCEDURE — 85379 FIBRIN DEGRADATION QUANT: CPT | Performed by: EMERGENCY MEDICINE

## 2025-01-16 PROCEDURE — 99284 EMERGENCY DEPT VISIT MOD MDM: CPT

## 2025-01-16 PROCEDURE — 84484 ASSAY OF TROPONIN QUANT: CPT

## 2025-01-16 PROCEDURE — 85025 COMPLETE CBC W/AUTO DIFF WBC: CPT

## 2025-01-16 PROCEDURE — 71045 X-RAY EXAM CHEST 1 VIEW: CPT

## 2025-01-16 PROCEDURE — 99285 EMERGENCY DEPT VISIT HI MDM: CPT

## 2025-01-16 PROCEDURE — 85025 COMPLETE CBC W/AUTO DIFF WBC: CPT | Performed by: EMERGENCY MEDICINE

## 2025-01-16 PROCEDURE — 93010 ELECTROCARDIOGRAM REPORT: CPT

## 2025-01-16 PROCEDURE — 80048 BASIC METABOLIC PNL TOTAL CA: CPT

## 2025-01-16 PROCEDURE — 36415 COLL VENOUS BLD VENIPUNCTURE: CPT

## 2025-01-16 RX ORDER — OSELTAMIVIR PHOSPHATE 75 MG/1
75 CAPSULE ORAL 2 TIMES DAILY
Qty: 10 CAPSULE | Refills: 0 | Status: SHIPPED | OUTPATIENT
Start: 2025-01-16 | End: 2025-01-16

## 2025-01-16 NOTE — ED PROVIDER NOTES
Patient Seen in: [facility] Emergency Department      History     Chief Complaint   Patient presents with    Chest Pain Angina     Stated Complaint: chest pain    Subjective:   HPI          Objective:     Past Medical History:    Age-related nuclear cataract of both eyes    Allergic rhinitis    Anemia    Anxiety    Celiac disease (HCC)    Depression    Dysplastic nevus    mid lower back    Dysplastic nevus    mid lower right back    E-coli UTI    Encephalopathy    Floater, vitreous    Head injury    Headache    Herpes zoster without complication    History of pregnancy    SAB in , Vaginal forceps in ;  x3    Lipid screening    per NG    MGD (meibomian gland dysfunction)    Post concussion syndrome    Presbyopia OU    Recurrent cold sores    Routine physical examination    Sleep apnea    Vertigo              Past Surgical History:   Procedure Laterality Date    Colonoscopy  2012    per NG    Colonoscopy N/A 2018    Procedure: COLONOSCOPY;  Surgeon: Alex Lyles MD;  Location: [facility] ENDOSCOPY    Colonoscopy N/A 2023    Procedure: COLONOSCOPY;  Surgeon: Alex Lyles MD;  Location: [facility] ENDOSCOPY    Colposcopy, cervix w/upper adjacent vagina; w/biopsy(s), cervix      D & c      Leep         , , ,     Vaginal forceps in ;  x3    Other accessory      bunionectomy                Social History     Socioeconomic History    Marital status:    Tobacco Use    Smoking status: Never     Passive exposure: Never    Smokeless tobacco: Never   Vaping Use    Vaping status: Never Used   Substance and Sexual Activity    Alcohol use: Yes     Alcohol/week: 0.0 - 2.0 standard drinks of alcohol     Comment: Occassional drink    Drug use: Never   Other Topics Concern    Caffeine Concern Yes     Comment: Tea 1 cup daily;     History of tanning Yes    Pt has a pacemaker No    Pt has a defibrillator No    Reaction to local anesthetic No   Social History  Narrative    ** Merged History Encounter **                       Physical Exam     ED Triage Vitals [01/16/25 1241]   BP (!) 134/91   Pulse 99   Resp 18   Temp 97.8 °F (36.6 °C)   Temp src Temporal   SpO2 98 %   O2 Device None (Room air)       Current Vitals:   Vital Signs  BP: (!) 134/91  Pulse: 99  Resp: 18  Temp: 97.8 °F (36.6 °C)  Temp src: Temporal    Oxygen Therapy  SpO2: 98 %  O2 Device: None (Room air)        Physical Exam        ED Course     Labs Reviewed   BASIC METABOLIC PANEL (8) - Abnormal; Notable for the following components:       Result Value    Glucose 123 (*)     BUN/CREA Ratio 20.5 (*)     All other components within normal limits   TROPONIN I HIGH SENSITIVITY - Normal   D-DIMER - Normal   SARS-COV-2/FLU A AND B/RSV BY PCR (GENEXPERT) - Normal    Narrative:     This test is intended for the qualitative detection and differentiation of SARS-CoV-2, influenza A, influenza B, and respiratory syncytial virus (RSV) viral RNA in nasopharyngeal or nares swabs from individuals suspected of respiratory viral infection consistent with COVID-19 by their healthcare provider. Signs and symptoms of respiratory viral infection due to SARS-CoV-2, influenza, and RSV can be similar.    Test performed using the Xpert Xpress SARS-CoV-2/FLU/RSV (real time RT-PCR)  assay on the [employer] instrument, [employer], [place] [address].   This test is being used under the Food and Drug Administration's Emergency Use Authorization.    The authorized Fact Sheet for Healthcare Providers for this assay is available upon request from the laboratory.   CBC WITH DIFFERENTIAL WITH PLATELET   RAINBOW DRAW LAVENDER   RAINBOW DRAW LIGHT GREEN   RAINBOW DRAW BLUE     EKG    Rate, intervals and axes as noted on EKG Report.  Rate: 85  Rhythm: Sinus Rhythm  Reading: Normal sinus rhythm without signs of acute ischemia or abnormal intervals.                         MDM      64-year-old female with history of celiac disease presents today  with chest pain, headache, upper back pain, fogginess, and fatigue.  Symptoms started this morning.  She also noticed at that time blood pressures up to 180/100.  Of note, patient also reports that she recently returned from [facility] about a week ago.  While there, she was having some left thigh pain without swelling which has since resolved.  Denies fevers, cough, shortness of breath, GI symptoms, or other symptoms at this time.    On exam, /91 with otherwise normal vitals, well-appearing, clear lungs, normal heart sounds, benign abdomen, mild extremity edema,    Differential: Viral illness, PE, aortic dissection, ACS    Patient evaluated with labs including troponin and D-dimer which were reassuring.  I independently reviewed the patient's chest x-ray. No clear evidence of consolidation or pneumothorax.  Patient made aware of the nodule visualized there.    Patient ordered for viral PCR.  If positive for flu, would be a candidate for Tamiflu.  Discharged in stable condition with return precautions.        Medical Decision Making      Disposition and Plan     Clinical Impression:  1. Chest pain, unspecified type    2. Other fatigue    3. Uncontrolled hypertension         Disposition:  Discharge  1/16/2025  3:53 pm    Follow-up:  Linda Barker DO  [address] [place] [id number]  [phone]    Schedule an appointment as soon as possible for a visit in 1 week(s)  As needed          Medications Prescribed:  Current Discharge Medication List        START taking these medications    Details   oseltamivir (TAMIFLU) 75 MG Oral Cap Take 1 capsule (75 mg total) by mouth 2 (two) times daily for 5 days.  Qty: 10 capsule, Refills: 0                 Supplementary Documentation:

## 2025-01-16 NOTE — ED INITIAL ASSESSMENT (HPI)
Pt c/o of chest pain, headache  and back pain that started this morning. Pt states her BP was high at work. Denies SOB. Pt states she feels foggy and fatigue. Pt states she just came back from [facility] 01/08.

## 2025-01-16 NOTE — TELEPHONE ENCOUNTER
Patient called to speak to nurse.     Patient states she woke up this morning with upper back & chest pains. Also had a headache.     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 Her current Blood pressure is 190/100.       Patient #[phone]

## 2025-01-16 NOTE — TELEPHONE ENCOUNTER
To Dr RIVERA ( Pt of Dr Alberts),    SANDY...    I called and spoke with patient who stated that she is a  and woke up not feeling good this morning with c/o upper back pain and \" chest discomfort especially with taking in a deep breath\" Also has c/o headache    No c/I shortness of breath or blurred vision.    She had the Nurse at the school check her blood pressure and it is 190/100.    Patient advised to seek immediate evaluation and will go to [facility] ED. Someone will drive her now

## 2025-01-16 NOTE — TELEPHONE ENCOUNTER
Symptoms noted in blood pressure noted.  Agree with immediate evaluation in emergency room.    Sherwin Mckeon.  SANDY.  Thank you.  Reynaldo

## 2025-01-17 ENCOUNTER — LAB ENCOUNTER (OUTPATIENT)
Dept: LAB | Age: 65
End: 2025-01-17
Attending: INTERNAL MEDICINE
Payer: COMMERCIAL

## 2025-01-17 ENCOUNTER — OFFICE VISIT (OUTPATIENT)
Dept: INTERNAL MEDICINE CLINIC | Facility: CLINIC | Age: 65
End: 2025-01-17

## 2025-01-17 VITALS
DIASTOLIC BLOOD PRESSURE: 76 MMHG | HEIGHT: 60 IN | HEART RATE: 85 BPM | TEMPERATURE: 98 F | BODY MASS INDEX: 35.14 KG/M2 | OXYGEN SATURATION: 99 % | WEIGHT: 179 LBS | SYSTOLIC BLOOD PRESSURE: 132 MMHG

## 2025-01-17 DIAGNOSIS — R93.89 ABNORMAL CHEST X-RAY: Primary | ICD-10-CM

## 2025-01-17 DIAGNOSIS — R41.82 ALTERED MENTAL STATUS, UNSPECIFIED ALTERED MENTAL STATUS TYPE: ICD-10-CM

## 2025-01-17 DIAGNOSIS — E78.5 HYPERLIPIDEMIA, UNSPECIFIED HYPERLIPIDEMIA TYPE: ICD-10-CM

## 2025-01-17 LAB
BILIRUB UR QL: NEGATIVE
CLARITY UR: CLEAR
GLUCOSE UR-MCNC: NORMAL MG/DL
KETONES UR-MCNC: NEGATIVE MG/DL
LEUKOCYTE ESTERASE UR QL STRIP.AUTO: 250
NITRITE UR QL STRIP.AUTO: NEGATIVE
PH UR: 5 [PH] (ref 5–8)
PROT UR-MCNC: NEGATIVE MG/DL
SP GR UR STRIP: 1.03 (ref 1–1.03)
UROBILINOGEN UR STRIP-ACNC: NORMAL

## 2025-01-17 PROCEDURE — 99214 OFFICE O/P EST MOD 30 MIN: CPT | Performed by: INTERNAL MEDICINE

## 2025-01-17 PROCEDURE — 87077 CULTURE AEROBIC IDENTIFY: CPT

## 2025-01-17 PROCEDURE — 3075F SYST BP GE 130 - 139MM HG: CPT | Performed by: INTERNAL MEDICINE

## 2025-01-17 PROCEDURE — 87086 URINE CULTURE/COLONY COUNT: CPT

## 2025-01-17 PROCEDURE — 81001 URINALYSIS AUTO W/SCOPE: CPT

## 2025-01-17 PROCEDURE — 87186 SC STD MICRODIL/AGAR DIL: CPT

## 2025-01-17 PROCEDURE — 3008F BODY MASS INDEX DOCD: CPT | Performed by: INTERNAL MEDICINE

## 2025-01-17 PROCEDURE — 3078F DIAST BP <80 MM HG: CPT | Performed by: INTERNAL MEDICINE

## 2025-01-17 NOTE — PROGRESS NOTES
Tatianna Rivera is a 64 year old female.No chief complaint on file.    HPI:   Tatianna Rivera is a 64 year old female who presents for TCM.    Pt presented to ED yesterday 1/16 for chest/back pain and HTN. Pt was at work and co-worker noticed she didn't seem right. Pt went to school RN who checked bp and was elevated 180/100. Pt called office and was instructed to go to the ER. Pt was feeling fine at the time just a tight muscle in her back radiating to her chest. In the ED, her bp was 134/91. CXR notable for w/RUL opacity. EKG stable, labs unremarkable. Pt was sent home.    Today she is feeling well. States back pain is like an sore ache that feels better when moving and it is much better since yesterday. Denies CP or dyspnea, headache. Is concerned she may have a UTI because she has had no symptoms in the past with a urinary tract infection. She did not have bp rechecked since ED visit.    Wt Readings from Last 6 Encounters:   01/17/25 179 lb (81.2 kg)   10/23/24 178 lb (80.7 kg)   06/06/24 178 lb (80.7 kg)   05/31/24 179 lb (81.2 kg)   11/21/23 172 lb (78 kg)   11/14/23 175 lb (79.4 kg)     Body mass index is 34.96 kg/m².     Current Outpatient Medications   Medication Sig Dispense Refill    albuterol 108 (90 Base) MCG/ACT Inhalation Aero Soln Inhale 2 puffs every 4-6 hours PRN 1 each 0    Vitamin D, Cholecalciferol, 25 MCG (1000 UT) Oral Tab Take 1 tablet by mouth daily.      valACYclovir 1 G Oral Tab Take 1 tablet (1,000 mg total) by mouth in the morning and 1 tablet (1,000 mg total) before bedtime. 20 tablet 0      Past Medical History:    Age-related nuclear cataract of both eyes    Allergic rhinitis    Anemia    Anxiety    Celiac disease (HCC)    Depression    Dysplastic nevus    mid lower back    Dysplastic nevus    mid lower right back    E-coli UTI    Encephalopathy    Floater, vitreous    Head injury    Headache    Herpes zoster without complication    History of pregnancy    SAB in 1984, Vaginal forceps  in ;  x3    Lipid screening    per NG    MGD (meibomian gland dysfunction)    Post concussion syndrome    Presbyopia OU    Recurrent cold sores    Routine physical examination    Sleep apnea    Vertigo      Past Surgical History:   Procedure Laterality Date    Colonoscopy  2012    per NG    Colonoscopy N/A 2018    Procedure: COLONOSCOPY;  Surgeon: Alex Lyles MD;  Location: [facility] ENDOSCOPY    Colonoscopy N/A 2023    Procedure: COLONOSCOPY;  Surgeon: Alex Lyles MD;  Location: [facility] ENDOSCOPY    Colposcopy, cervix w/upper adjacent vagina; w/biopsy(s), cervix      D & c      Lee         , , , 89    Vaginal forceps in ;  x3    Other accessory      bunionectomy      Family History   Problem Relation Age of Onset    Gastro-Intestinal Disorder Mother         Celiac disease    Asthma Mother     Pulmonary Disease Mother         COPD    Cancer Father         Lung cancer    Hypertension Father     Lipids Father         Hyperlipidemia    Heart Surgery Father         CABG    Gastro-Intestinal Disorder Father         Celiac disease    Melanoma Father     Asthma Father     Gastro-Intestinal Disorder Sister         Celiac disease    Cancer Sister         Breast and lung    Breast Cancer Sister 59    Asthma Sister     Genetic Disease Sister         Thyroid    Pulmonary Disease Sister         COPD    Asthma Sister     Genetic Disease Sister         Thyroid    Pulmonary Disease Sister         COPD    Pulmonary Disease Sister         COPD    Gastro-Intestinal Disorder Brother         Celiac disease    Thyroid disease Brother     Thyroid disease Brother     Asthma Son     ADHD Son     ADHD Son     No Known Problems Son     Hypertension Other         Family H/o    Thyroid disease Other         Family H/o: Grave's disease [Most of siblings (8)]    Diabetes Other         Nephew    Glaucoma Neg     Macular degeneration Neg       Social History:   Social History      Socioeconomic History    Marital status:    Tobacco Use    Smoking status: Never     Passive exposure: Never    Smokeless tobacco: Never   Vaping Use    Vaping status: Never Used   Substance and Sexual Activity    Alcohol use: Yes     Alcohol/week: 0.0 - 2.0 standard drinks of alcohol     Comment: Occassional drink    Drug use: Never   Other Topics Concern    Caffeine Concern Yes     Comment: Tea 1 cup daily;     History of tanning Yes    Pt has a pacemaker No    Pt has a defibrillator No    Reaction to local anesthetic No   Social History Narrative    ** Merged History Encounter **             REVIEW OF SYSTEMS:   GENERAL: feels well otherwise, denies f/c  LUNGS: denies shortness of breath with exertion, cough or wheezing  CARDIOVASCULAR: denies chest pain, pressure, or palpitations  GI: denies abdominal pain, nausea, vomiting, diarrhea  : denies dysuria, hematuria  NEURO: denies headaches    EXAM:   /76   Pulse 85   Temp 97.9 °F (36.6 °C)   Ht 5' (1.524 m)   Wt 179 lb (81.2 kg)   LMP  (LMP Unknown)   SpO2 99%   BMI 34.96 kg/m²     GENERAL: well developed, well nourished, in no apparent distress  LUNGS: clear to auscultation b/l, no w/r/r  CARDIO: RRR, normal S1S2, no m/r/g  MSK: + L upper throacic paraspinal ttp  NEURO: A&O x 3, moves all 4 extremities spontaneously    ASSESSMENT AND PLAN:   Tatianna Rivera is a 64 year old female who presents for TCM.    Elevated bp  - advised home bp monitoring x1-2 weeks - pt will use sister's cuff on weekends and school RN weekdays  - proper bp taking technique discussed  - advised to call w/bp log or if persistently uncontrolled, pt verbalized understanding and agreed to plan    Back pain, improved  - likely MSK strain  - advised continued conservative measures such as stretching    Abnormal chest x-ray  - CT CHEST (CPT=71219); Future    Altered mental status, unspecified altered mental status type  - Urinalysis with Culture Reflex;  Future    Hypercholesterolemia  - CT CALCIUM SCORING; Future    Obesity  - discussed GLP-1 mechanism of action, side effects, contraindications  - pt eats healthy and tries to stay active w/o improvement in weight, will think about r/b/a of weight loss medications.     RTC as previously scheduled or sooner PRN.    For E/M code - 30 minutes spent reviewing performing chart review, obtaining a history, performing a physical exam, reviewing the assessment/plan, placing orders, and completing documentation.     Linda Barker DO  1/17/2025  11:42 AM

## 2025-01-17 NOTE — TELEPHONE ENCOUNTER
As FYI to  - patient was seen in ER for chest pain  Clinical Impression:  1. Chest pain, unspecified type    2. Other fatigue    3. Uncontrolled hypertension    On Tamiflu

## 2025-01-20 ENCOUNTER — TELEPHONE (OUTPATIENT)
Dept: INTERNAL MEDICINE CLINIC | Facility: CLINIC | Age: 65
End: 2025-01-20

## 2025-01-20 DIAGNOSIS — I10 HYPERTENSION, UNSPECIFIED TYPE: Primary | ICD-10-CM

## 2025-01-20 RX ORDER — LOSARTAN POTASSIUM 25 MG/1
12.5 TABLET ORAL DAILY
Qty: 45 TABLET | Refills: 0 | Status: SHIPPED | OUTPATIENT
Start: 2025-01-20 | End: 2025-04-20

## 2025-01-20 RX ORDER — NITROFURANTOIN 25; 75 MG/1; MG/1
100 CAPSULE ORAL 2 TIMES DAILY
Qty: 10 CAPSULE | Refills: 0 | Status: SHIPPED | OUTPATIENT
Start: 2025-01-20 | End: 2025-01-25

## 2025-01-20 NOTE — TELEPHONE ENCOUNTER
To Dr. Alberts--Please advise.     Patient reporting home BP log as instructed. Reports feeling fatigue.  Pt currently on antibiotic for urinary tract infection. Usually feels fatigue with urinary tract infection.    1/18: 165/90    1/19  9am: 145/82  2pm: 141/87  7pm: 164/87    1/20  12pm: 153/95  3pm: 161/85    Patient denied any of the following symptoms: chest pain/pressure/tightness/fullness, radiating pain/discomfort: neck, jaw, shoulder(s), arm(s), shoulder blade(s), back, upper abd., shortness of breath, dizziness, lightheaded, cold sweat, sweating, nausea/vomiting

## 2025-01-20 NOTE — TELEPHONE ENCOUNTER
To nursing staff, please relay the following to Tatianna Rivera:    Pt has E.coli UTI. Rx macrobid sent into her pharmacy.     Thank you!

## 2025-01-20 NOTE — TELEPHONE ENCOUNTER
Spoke with patient and relayed MD's message. Verbalized understanding and agreement with plan.   Patient was advised to call the office with any persisting/new/worsening symptoms, or concerns.

## 2025-01-21 NOTE — TELEPHONE ENCOUNTER
Still high. Recommend starting low dose losartan 12.5 mg daily. Continue monitoring bps and repeat bmp in the next 1-2 weeks, f/u thereafter.    Thank you!

## 2025-01-29 ENCOUNTER — HOSPITAL ENCOUNTER (OUTPATIENT)
Dept: CT IMAGING | Facility: HOSPITAL | Age: 65
Discharge: HOME | End: 2025-01-29
Attending: INTERNAL MEDICINE
Payer: COMMERCIAL

## 2025-01-29 DIAGNOSIS — R93.89 ABNORMAL CHEST X-RAY: ICD-10-CM

## 2025-01-29 PROCEDURE — 71250 CT THORAX DX C-: CPT | Performed by: INTERNAL MEDICINE

## 2025-02-03 ENCOUNTER — TELEPHONE (OUTPATIENT)
Dept: INTERNAL MEDICINE CLINIC | Facility: CLINIC | Age: 65
End: 2025-02-03

## 2025-02-04 ENCOUNTER — HOSPITAL ENCOUNTER (OUTPATIENT)
Dept: CT IMAGING | Facility: HOSPITAL | Age: 65
Discharge: HOME | End: 2025-02-04
Attending: INTERNAL MEDICINE
Payer: COMMERCIAL

## 2025-02-04 ENCOUNTER — LAB ENCOUNTER (OUTPATIENT)
Dept: LAB | Facility: HOSPITAL | Age: 65
End: 2025-02-04
Attending: INTERNAL MEDICINE
Payer: COMMERCIAL

## 2025-02-04 DIAGNOSIS — E78.5 HYPERLIPIDEMIA, UNSPECIFIED HYPERLIPIDEMIA TYPE: ICD-10-CM

## 2025-02-04 DIAGNOSIS — I10 HYPERTENSION, UNSPECIFIED TYPE: ICD-10-CM

## 2025-02-04 LAB
ANION GAP SERPL CALC-SCNC: 9 MMOL/L (ref 0–18)
BUN BLD-MCNC: 17 MG/DL (ref 9–23)
BUN/CREAT SERPL: 21.3 (ref 10–20)
CALCIUM BLD-MCNC: 9.4 MG/DL (ref 8.7–10.4)
CHLORIDE SERPL-SCNC: 106 MMOL/L (ref 98–112)
CO2 SERPL-SCNC: 29 MMOL/L (ref 21–32)
CREAT BLD-MCNC: 0.8 MG/DL
EGFRCR SERPLBLD CKD-EPI 2021: 82 ML/MIN/1.73M2 (ref 60–?)
FASTING STATUS PATIENT QL REPORTED: NO
GLUCOSE BLD-MCNC: 79 MG/DL (ref 70–99)
OSMOLALITY SERPL CALC.SUM OF ELEC: 298 MOSM/KG (ref 275–295)
POTASSIUM SERPL-SCNC: 4.3 MMOL/L (ref 3.5–5.1)
SODIUM SERPL-SCNC: 144 MMOL/L (ref 136–145)

## 2025-02-04 PROCEDURE — 80048 BASIC METABOLIC PNL TOTAL CA: CPT

## 2025-02-04 PROCEDURE — 36415 COLL VENOUS BLD VENIPUNCTURE: CPT

## 2025-02-07 ENCOUNTER — TELEPHONE (OUTPATIENT)
Dept: INTERNAL MEDICINE CLINIC | Facility: CLINIC | Age: 65
End: 2025-02-07

## 2025-02-10 ENCOUNTER — TELEPHONE (OUTPATIENT)
Dept: INTERNAL MEDICINE CLINIC | Facility: CLINIC | Age: 65
End: 2025-02-10

## 2025-02-19 ENCOUNTER — TELEPHONE (OUTPATIENT)
Dept: INTERNAL MEDICINE CLINIC | Facility: CLINIC | Age: 65
End: 2025-02-19

## 2025-02-19 DIAGNOSIS — I10 HYPERTENSION, UNSPECIFIED TYPE: Primary | ICD-10-CM

## 2025-02-19 RX ORDER — LOSARTAN POTASSIUM 25 MG/1
25 TABLET ORAL DAILY
Qty: 90 TABLET | Refills: 0 | Status: SHIPPED | OUTPATIENT
Start: 2025-02-19 | End: 2026-02-14

## 2025-02-19 NOTE — TELEPHONE ENCOUNTER
Patient started Losartan 3 weeks ago and has not noticed any improvement with her blood pressure readings     Provided the following readings   149/87, 161/79, 165/83 & 152/79     Requests call back to advise [phone]

## 2025-03-06 ENCOUNTER — TELEPHONE (OUTPATIENT)
Dept: INTERNAL MEDICINE CLINIC | Facility: CLINIC | Age: 65
End: 2025-03-06

## 2025-03-06 ENCOUNTER — LAB ENCOUNTER (OUTPATIENT)
Dept: LAB | Age: 65
End: 2025-03-06
Attending: INTERNAL MEDICINE
Payer: COMMERCIAL

## 2025-03-06 ENCOUNTER — PATIENT MESSAGE (OUTPATIENT)
Dept: INTERNAL MEDICINE CLINIC | Facility: CLINIC | Age: 65
End: 2025-03-06

## 2025-03-06 ENCOUNTER — NURSE ONLY (OUTPATIENT)
Dept: INTERNAL MEDICINE CLINIC | Facility: CLINIC | Age: 65
End: 2025-03-06
Payer: COMMERCIAL

## 2025-03-06 VITALS — HEART RATE: 86 BPM | SYSTOLIC BLOOD PRESSURE: 144 MMHG | OXYGEN SATURATION: 97 % | DIASTOLIC BLOOD PRESSURE: 82 MMHG

## 2025-03-06 DIAGNOSIS — I10 HYPERTENSION, UNSPECIFIED TYPE: Primary | ICD-10-CM

## 2025-03-06 DIAGNOSIS — I10 HYPERTENSION, UNSPECIFIED TYPE: ICD-10-CM

## 2025-03-06 DIAGNOSIS — Z01.30 BLOOD PRESSURE CHECK: Primary | ICD-10-CM

## 2025-03-06 LAB
ANION GAP SERPL CALC-SCNC: 6 MMOL/L (ref 0–18)
BUN BLD-MCNC: 22 MG/DL (ref 9–23)
BUN/CREAT SERPL: 25 (ref 10–20)
CALCIUM BLD-MCNC: 9.2 MG/DL (ref 8.7–10.4)
CHLORIDE SERPL-SCNC: 104 MMOL/L (ref 98–112)
CO2 SERPL-SCNC: 29 MMOL/L (ref 21–32)
CREAT BLD-MCNC: 0.88 MG/DL
EGFRCR SERPLBLD CKD-EPI 2021: 73 ML/MIN/1.73M2 (ref 60–?)
FASTING STATUS PATIENT QL REPORTED: NO
GLUCOSE BLD-MCNC: 98 MG/DL (ref 70–99)
OSMOLALITY SERPL CALC.SUM OF ELEC: 291 MOSM/KG (ref 275–295)
POTASSIUM SERPL-SCNC: 4 MMOL/L (ref 3.5–5.1)
SODIUM SERPL-SCNC: 139 MMOL/L (ref 136–145)

## 2025-03-06 PROCEDURE — 80048 BASIC METABOLIC PNL TOTAL CA: CPT

## 2025-03-06 PROCEDURE — 36415 COLL VENOUS BLD VENIPUNCTURE: CPT

## 2025-03-06 NOTE — PROGRESS NOTES
Patient presents today for blood pressure check. Name and  verified. BP taken and documented. TE created and routed to PCP for review and advise on treatment plan.

## 2025-03-06 NOTE — TELEPHONE ENCOUNTER
To Dr. Alberts--please advise. Attempted to schedule virtual phone visit. No availability for remainder of month. Pt is open to starting anxiety medication if it will help lower BP.     BP taken today at nurse visit 144/82 P86.    Patient verified taking increased dose of Losartan 50 mg daily.  Patient has been monitoring blood pressure at home. AM blood pressure is taken PRIOR to antihypertensive Rx.   Discussed taking blood pressure 1-2 hours after antihypertensive.     Patient will send [employer] message with BP log.     Reports increased anxiety and work related stress.  Previously took Xanax and Clonazepam.     Reports ongoing intermittent chest tightness (eval'd at [facility] 1/16/25; subsequent f/u visit 1/17/25)  Triggers: correlates to when she's rushing or stressed.   Alleviating factors: taking deep breaths.   Duration: 1 second.     +intermittent slight h/a.    Reports feeling lightheaded every morning.   Triggers: sitting on the side of bed.   Duration: No more than 30 mins.   History of vertigo.     Patient denied any of the following symptoms: chest pain, radiating pain/discomfort: neck, jaw, shoulder(s), arm(s), shoulder blade(s), back, upper abd., shortness of breath, cold sweat, sweating, nausea/vomiting, fatigue.    Post nurse visit, pt was reminded to complete BMP.     Patient was notified that this message will be routed to the physician to determine what their recommendation (s) would be. In the meantime, if they develop new or worsening symptoms, they were advised to call 911. ER precautions reviewed.

## 2025-03-10 RX ORDER — LOSARTAN POTASSIUM 100 MG/1
100 TABLET ORAL DAILY
Qty: 90 TABLET | Refills: 0 | Status: SHIPPED | OUTPATIENT
Start: 2025-03-10 | End: 2026-03-05

## 2025-03-11 NOTE — TELEPHONE ENCOUNTER
To nursing staff, please relay the following to Tatianna Rivera:    Potassium and kidney function were normal.    Recommend increasing losartan to 100 mg daily, repeat bmp in 1 week and schedule office visit to discuss. Ok to book 5:30 3/18 if she is able.    Thank you!

## 2025-03-11 NOTE — TELEPHONE ENCOUNTER
Patient contacted and relayed 's message below. Patient verbalized understanding and agreement to the plan.

## 2025-03-18 ENCOUNTER — LAB ENCOUNTER (OUTPATIENT)
Dept: LAB | Age: 65
End: 2025-03-18
Attending: INTERNAL MEDICINE
Payer: COMMERCIAL

## 2025-03-18 DIAGNOSIS — I10 HYPERTENSION, UNSPECIFIED TYPE: ICD-10-CM

## 2025-03-18 LAB
ANION GAP SERPL CALC-SCNC: 8 MMOL/L (ref 0–18)
BUN BLD-MCNC: 21 MG/DL (ref 9–23)
BUN/CREAT SERPL: 23.3 (ref 10–20)
CALCIUM BLD-MCNC: 8.7 MG/DL (ref 8.7–10.4)
CHLORIDE SERPL-SCNC: 107 MMOL/L (ref 98–112)
CO2 SERPL-SCNC: 27 MMOL/L (ref 21–32)
CREAT BLD-MCNC: 0.9 MG/DL
EGFRCR SERPLBLD CKD-EPI 2021: 71 ML/MIN/1.73M2 (ref 60–?)
FASTING STATUS PATIENT QL REPORTED: NO
GLUCOSE BLD-MCNC: 125 MG/DL (ref 70–99)
OSMOLALITY SERPL CALC.SUM OF ELEC: 298 MOSM/KG (ref 275–295)
POTASSIUM SERPL-SCNC: 4.5 MMOL/L (ref 3.5–5.1)
SODIUM SERPL-SCNC: 142 MMOL/L (ref 136–145)

## 2025-03-18 PROCEDURE — 36415 COLL VENOUS BLD VENIPUNCTURE: CPT

## 2025-03-18 PROCEDURE — 80048 BASIC METABOLIC PNL TOTAL CA: CPT

## 2025-03-19 ENCOUNTER — TELEPHONE (OUTPATIENT)
Dept: INTERNAL MEDICINE CLINIC | Facility: CLINIC | Age: 65
End: 2025-03-19

## 2025-05-05 ENCOUNTER — TELEPHONE (OUTPATIENT)
Dept: INTERNAL MEDICINE CLINIC | Facility: CLINIC | Age: 65
End: 2025-05-05

## 2025-05-05 ENCOUNTER — OFFICE VISIT (OUTPATIENT)
Dept: INTERNAL MEDICINE CLINIC | Facility: CLINIC | Age: 65
End: 2025-05-05
Payer: COMMERCIAL

## 2025-05-05 ENCOUNTER — LAB ENCOUNTER (OUTPATIENT)
Dept: LAB | Age: 65
End: 2025-05-05
Attending: INTERNAL MEDICINE
Payer: COMMERCIAL

## 2025-05-05 VITALS
HEART RATE: 80 BPM | RESPIRATION RATE: 16 BRPM | DIASTOLIC BLOOD PRESSURE: 78 MMHG | OXYGEN SATURATION: 98 % | SYSTOLIC BLOOD PRESSURE: 132 MMHG | BODY MASS INDEX: 35.61 KG/M2 | WEIGHT: 181.38 LBS | HEIGHT: 60 IN

## 2025-05-05 DIAGNOSIS — E66.812 CLASS 2 OBESITY WITH BODY MASS INDEX (BMI) OF 35.0 TO 35.9 IN ADULT, UNSPECIFIED OBESITY TYPE, UNSPECIFIED WHETHER SERIOUS COMORBIDITY PRESENT: ICD-10-CM

## 2025-05-05 DIAGNOSIS — G47.33 OBSTRUCTIVE SLEEP APNEA: ICD-10-CM

## 2025-05-05 DIAGNOSIS — D22.9 ATYPICAL NEVI: Primary | ICD-10-CM

## 2025-05-05 DIAGNOSIS — Z78.9 MEASLES, MUMPS, RUBELLA (MMR) VACCINATION STATUS UNKNOWN: ICD-10-CM

## 2025-05-05 PROCEDURE — 86765 RUBEOLA ANTIBODY: CPT

## 2025-05-05 PROCEDURE — 99214 OFFICE O/P EST MOD 30 MIN: CPT | Performed by: INTERNAL MEDICINE

## 2025-05-05 PROCEDURE — 3078F DIAST BP <80 MM HG: CPT | Performed by: INTERNAL MEDICINE

## 2025-05-05 PROCEDURE — 3075F SYST BP GE 130 - 139MM HG: CPT | Performed by: INTERNAL MEDICINE

## 2025-05-05 PROCEDURE — 36415 COLL VENOUS BLD VENIPUNCTURE: CPT

## 2025-05-05 PROCEDURE — 3008F BODY MASS INDEX DOCD: CPT | Performed by: INTERNAL MEDICINE

## 2025-05-05 RX ORDER — TIRZEPATIDE 2.5 MG/.5ML
2.5 INJECTION, SOLUTION SUBCUTANEOUS WEEKLY
Qty: 2 ML | Refills: 0 | Status: SHIPPED | OUTPATIENT
Start: 2025-05-05 | End: 2025-05-27

## 2025-05-05 RX ORDER — OSELTAMIVIR PHOSPHATE 75 MG/1
CAPSULE ORAL
COMMUNITY
Start: 2025-01-17

## 2025-05-05 RX ORDER — LOSARTAN POTASSIUM 100 MG/1
100 TABLET ORAL DAILY
Qty: 90 TABLET | Refills: 3 | Status: SHIPPED | OUTPATIENT
Start: 2025-06-02 | End: 2026-05-28

## 2025-05-05 RX ORDER — VALACYCLOVIR HYDROCHLORIDE 1 G/1
1000 TABLET, FILM COATED ORAL 2 TIMES DAILY
Qty: 30 TABLET | Refills: 5 | Status: SHIPPED | OUTPATIENT
Start: 2025-05-05

## 2025-05-05 NOTE — PROGRESS NOTES
Tatianna Rivera is a 65 year old female.  Chief Complaint   Patient presents with    Medication Follow-Up     Pt. Presents for a follow up on blood pressure, pt. States that she has been taking losartan every day and blood pressure has been ranging from 126-158, and systolic 68-74 at different times of the day.      HPI:   Tatianna Rivera is a 65 year old female who presents to discuss medications.    Patient asking about starting zepbound for STEVEN.     Patient's bps are doing better on celexa but doesn't feel much different emotionally. Does not want to increase her dose at this time.    Asking for derm referral to evaluate a mole.    Patient believes she is a non-responder to MMR vaccination. Hasn't had a vaccine or titer in 29 years.    Asking about a pneumonia shot.    Wt Readings from Last 6 Encounters:   05/05/25 181 lb 6 oz (82.3 kg)   03/18/25 181 lb 9.6 oz (82.4 kg)   01/17/25 179 lb (81.2 kg)   10/23/24 178 lb (80.7 kg)   06/06/24 178 lb (80.7 kg)   05/31/24 179 lb (81.2 kg)     Body mass index is 35.42 kg/m².     Current Medications[1]   Past Medical History[2]   Past Surgical History[3]   Family History[4]   Social History:   Short Social Hx on File[5]       REVIEW OF SYSTEMS:   GENERAL: feels well otherwise  SKIN: nevi    EXAM:   /78   Pulse 80   Resp 16   Ht 5' (1.524 m)   Wt 181 lb 6 oz (82.3 kg)   LMP  (LMP Unknown)   SpO2 98%   BMI 35.42 kg/m²     GENERAL: well developed, well nourished, in no apparent distress  NEURO: A&O x 3, moves all 4 extremities spontaneously    ASSESSMENT AND PLAN:   Tatianna Rivera is a 65 year old female who presents to discuss medications.    Atypical nevi  - Derm Referral - Liz Bradley)    Obstructive sleep apnea  Class 2 obesity with body mass index (BMI) of 35.0 to 35.9 in adult, unspecified obesity type, unspecified whether serious comorbidity present  - pt has no contraindications to use of GLP-1s - no personal or fam hx medullary thyroid cancer or  MEN syndrome, no hx pancreatitis   - Tirzepatide-Weight Management (ZEPBOUND) 2.5 MG/0.5ML Subcutaneous Solution Auto-injector; Inject 2.5 mg into the skin once a week for 4 doses.  Dispense: 2 mL; Refill: 0    Measles, mumps, rubella (MMR) vaccination status unknown  - Rubeola(Measles)Antibodies, IGG-Immunity; Future    HTN  - losartan 100 mg daily, rx refilled    Anxiety  - celexa 10 mg daily    For E/M code - 30 minutes spent reviewing performing chart review, obtaining a history, performing a physical exam, reviewing the assessment/plan, placing orders, and completing documentation.     Linda Barker DO  2025  12:21 PM         [1]   Current Outpatient Medications   Medication Sig Dispense Refill    Tirzepatide-Weight Management (ZEPBOUND) 2.5 MG/0.5ML Subcutaneous Solution Auto-injector Inject 2.5 mg into the skin once a week for 4 doses. 2 mL 0    [START ON 2025] losartan 100 MG Oral Tab Take 1 tablet (100 mg total) by mouth daily. 90 tablet 3    valACYclovir 1 G Oral Tab Take 1 tablet (1,000 mg total) by mouth in the morning and 1 tablet (1,000 mg total) before bedtime. 30 tablet 5    citalopram 10 MG Oral Tab Take 1 tablet (10 mg total) by mouth daily. 90 tablet 3    Vitamin D, Cholecalciferol, 25 MCG (1000 UT) Oral Tab Take 1 tablet by mouth daily.      oseltamivir 75 MG Oral Cap  (Patient not taking: Reported on 2025)      albuterol 108 (90 Base) MCG/ACT Inhalation Aero Soln Inhale 2 puffs every 4-6 hours PRN (Patient not taking: Reported on 2025) 1 each 0   [2]   Past Medical History:   Age-related nuclear cataract of both eyes    Allergic rhinitis    Anemia    Anxiety    Celiac disease (HCC)    Depression    Dysplastic nevus    mid lower back    Dysplastic nevus    mid lower right back    E-coli UTI    Encephalopathy    Floater, vitreous    Head injury    Headache    Herpes zoster without complication    History of pregnancy    SAB in , Vaginal forceps in ;  x3    Lipid  screening    per NG    MGD (meibomian gland dysfunction)    Post concussion syndrome    Presbyopia OU    Recurrent cold sores    Routine physical examination    Sleep apnea    Vertigo   [3]   Past Surgical History:  Procedure Laterality Date    Colonoscopy  2012    per NG    Colonoscopy N/A 2018    Procedure: COLONOSCOPY;  Surgeon: Alex Lyles MD;  Location: [facility] ENDOSCOPY    Colonoscopy N/A 2023    Procedure: COLONOSCOPY;  Surgeon: Alex Lyles MD;  Location: [facility] ENDOSCOPY    Colposcopy, cervix w/upper adjacent vagina; w/biopsy(s), cervix      D & c      Leep         , , ,     Vaginal forceps in ;  x3    Other accessory      bunionectomy   [4]   Family History  Problem Relation Age of Onset    Gastro-Intestinal Disorder Mother         Celiac disease    Asthma Mother     Pulmonary Disease Mother         COPD    Cancer Father         Lung cancer    Hypertension Father     Lipids Father         Hyperlipidemia    Heart Surgery Father         CABG    Gastro-Intestinal Disorder Father         Celiac disease    Melanoma Father     Asthma Father     Gastro-Intestinal Disorder Sister         Celiac disease    Cancer Sister         Breast and lung    Breast Cancer Sister 59    Asthma Sister     Genetic Disease Sister         Thyroid    Pulmonary Disease Sister         COPD    Asthma Sister     Genetic Disease Sister         Thyroid    Pulmonary Disease Sister         COPD    Pulmonary Disease Sister         COPD    Gastro-Intestinal Disorder Brother         Celiac disease    Thyroid disease Brother     Thyroid disease Brother     Asthma Son     ADHD Son     ADHD Son     No Known Problems Son     Hypertension Other         Family H/o    Thyroid disease Other         Family H/o: Grave's disease [Most of siblings (8)]    Diabetes Other         Nephew    Glaucoma Neg     Macular degeneration Neg    [5]   Social History  Socioeconomic History    Marital status:     Tobacco Use    Smoking status: Never     Passive exposure: Never    Smokeless tobacco: Never   Vaping Use    Vaping status: Never Used   Substance and Sexual Activity    Alcohol use: Yes     Alcohol/week: 0.0 - 2.0 standard drinks of alcohol     Comment: Occassional drink    Drug use: Never   Other Topics Concern    Caffeine Concern Yes     Comment: Tea 1 cup daily;     History of tanning Yes    Pt has a pacemaker No    Pt has a defibrillator No    Reaction to local anesthetic No   Social History Narrative    ** Merged History Encounter **

## 2025-05-07 ENCOUNTER — TELEPHONE (OUTPATIENT)
Dept: INTERNAL MEDICINE CLINIC | Facility: CLINIC | Age: 65
End: 2025-05-07

## 2025-05-07 LAB — MEV IGG SER-ACNC: 102 AU/ML (ref 16.5–?)

## 2025-06-03 ENCOUNTER — TELEPHONE (OUTPATIENT)
Dept: INTERNAL MEDICINE CLINIC | Facility: CLINIC | Age: 65
End: 2025-06-03

## 2025-06-03 DIAGNOSIS — E66.812 CLASS 2 OBESITY WITH BODY MASS INDEX (BMI) OF 35.0 TO 35.9 IN ADULT, UNSPECIFIED OBESITY TYPE, UNSPECIFIED WHETHER SERIOUS COMORBIDITY PRESENT: ICD-10-CM

## 2025-06-03 DIAGNOSIS — G47.33 OBSTRUCTIVE SLEEP APNEA: ICD-10-CM

## 2025-06-06 RX ORDER — LOSARTAN POTASSIUM 25 MG/1
25 TABLET ORAL DAILY
Qty: 90 TABLET | Refills: 0 | OUTPATIENT
Start: 2025-06-06

## 2025-06-06 RX ORDER — TIRZEPATIDE 2.5 MG/.5ML
INJECTION, SOLUTION SUBCUTANEOUS
Qty: 2 ML | Refills: 0 | OUTPATIENT
Start: 2025-06-06

## 2025-06-06 RX ORDER — TIRZEPATIDE 5 MG/.5ML
5 INJECTION, SOLUTION SUBCUTANEOUS WEEKLY
Qty: 2 ML | Refills: 1 | Status: SHIPPED | OUTPATIENT
Start: 2025-06-06 | End: 2025-06-28

## 2025-06-06 NOTE — TELEPHONE ENCOUNTER
Losartan increased to 100mg daily and new Rx sent at that time.     Refill request has failed the Ambulatory Medication Refill Standing Order and is routed to the primary physician to review the following:    Requested Prescriptions     Refused Prescriptions Disp Refills    LOSARTAN 25 MG Oral Tab [Pharmacy Med Name: LOSARTAN 25MG TABLETS] 90 tablet 0     Sig: TAKE 1 TABLET(25 MG) BY MOUTH DAILY     Refused By: RAJI PIERRE     Reason for Refusal: Refill not appropriate

## 2025-06-06 NOTE — TELEPHONE ENCOUNTER
I spoke to Tatianna for update  She has had no negative side effects since starting the zepbound 2.5 mg  She has lost 4lbs  She is working hard to keep calories down, exercise more, very motivated      To Dr Barker to please advise on refill request---  Should patient be increased to 5 mg? (I also pended 5 mg)  Patient is concerned that she will need another refill before she establishes with Dr Beach  Can this script have refill on it?

## 2025-06-06 NOTE — TELEPHONE ENCOUNTER
Rx zepbound 5 mg dose. Advised pt call if adverse side effects develop. All questions were answered.

## 2025-07-14 ENCOUNTER — OFFICE VISIT (OUTPATIENT)
Age: 65
End: 2025-07-14
Payer: COMMERCIAL

## 2025-07-14 VITALS
HEIGHT: 60 IN | BODY MASS INDEX: 32.2 KG/M2 | SYSTOLIC BLOOD PRESSURE: 122 MMHG | OXYGEN SATURATION: 97 % | WEIGHT: 164 LBS | HEART RATE: 72 BPM | DIASTOLIC BLOOD PRESSURE: 68 MMHG

## 2025-07-14 DIAGNOSIS — D22.9 ATYPICAL NEVI: ICD-10-CM

## 2025-07-14 DIAGNOSIS — G47.33 OBSTRUCTIVE SLEEP APNEA: ICD-10-CM

## 2025-07-14 DIAGNOSIS — F41.9 ANXIETY: ICD-10-CM

## 2025-07-14 DIAGNOSIS — E66.811 CLASS 1 OBESITY DUE TO EXCESS CALORIES WITH SERIOUS COMORBIDITY AND BODY MASS INDEX (BMI) OF 31.0 TO 31.9 IN ADULT: Primary | ICD-10-CM

## 2025-07-14 DIAGNOSIS — E66.09 CLASS 1 OBESITY DUE TO EXCESS CALORIES WITH SERIOUS COMORBIDITY AND BODY MASS INDEX (BMI) OF 31.0 TO 31.9 IN ADULT: Primary | ICD-10-CM

## 2025-07-14 DIAGNOSIS — I10 HYPERTENSION, UNSPECIFIED TYPE: ICD-10-CM

## 2025-07-14 DIAGNOSIS — Z12.31 BREAST CANCER SCREENING BY MAMMOGRAM: ICD-10-CM

## 2025-07-14 PROCEDURE — 3074F SYST BP LT 130 MM HG: CPT | Performed by: INTERNAL MEDICINE

## 2025-07-14 PROCEDURE — 3078F DIAST BP <80 MM HG: CPT | Performed by: INTERNAL MEDICINE

## 2025-07-14 PROCEDURE — 3008F BODY MASS INDEX DOCD: CPT | Performed by: INTERNAL MEDICINE

## 2025-07-14 PROCEDURE — 99204 OFFICE O/P NEW MOD 45 MIN: CPT | Performed by: INTERNAL MEDICINE

## 2025-07-14 RX ORDER — TIRZEPATIDE 5 MG/.5ML
5 INJECTION, SOLUTION SUBCUTANEOUS
COMMUNITY
Start: 2025-07-13 | End: 2025-07-14

## 2025-07-14 RX ORDER — TIRZEPATIDE 5 MG/.5ML
5 INJECTION, SOLUTION SUBCUTANEOUS WEEKLY
Qty: 6 ML | Refills: 1 | Status: SHIPPED | OUTPATIENT
Start: 2025-07-14

## 2025-07-14 NOTE — PROGRESS NOTES
History of Present Illness  Tatianna Rivera is a 65 year old female who presents for a follow-up visit.    She experienced a past episode of sudden numbness and inability to speak, leading to a hospital admission. Initially, she woke up unable to feel her body and had difficulty holding objects. She attempted to call 911 but was unable to speak. A stroke was suspected, but extensive testing was performed to determine the cause. She experienced prolonged symptoms including difficulty with motor functions and aphasia, which gradually improved over several months.    She has a history of celiac disease and adheres to a gluten-free diet. She experienced an episode of diarrhea after consuming a cookie at work, which she attributes to her condition.    She has a history of anxiety, which she attributes to past personal stressors, and has been on and off anxiety medication as needed. She currently takes citalopram, which she started again in March due to increased anxiety at work.    She has been diagnosed with sleep apnea and uses a CPAP machine, which has improved her sleep quality. Her CPAP settings are low and her compliance is good, with improved sleep scores.    She has been experiencing weight issues despite regular physical activity, including swimming and walking. She started a new medication regimen and has lost 14 pounds, reporting improved mobility and decreased desire for unhealthy foods. She also notes a decreased tolerance for alcohol since starting the medication.    She has a history of recurrent urinary tract infections and was previously on a maintenance antibiotic for ESBL in her urine. No current urinary symptoms.    Her family history includes a strong prevalence of thyroid disease, with several family members having Graves' disease. She monitors her thyroid function regularly.             Patient Active Problem List    Diagnosis Date Noted    Transient neurologic deficit 01/18/2023    Urinary  tract infection without hematuria, site unspecified 01/17/2023    Sleep apnea     Anxiety 07/23/2020    Bunion of great toe of left foot 12/03/2019    Encephalopathy 12/03/2019    Neoplasm of uncertain behavior of skin 01/28/2019    Sun-damaged skin 01/28/2019    GILBERTO II (cervical intraepithelial neoplasia II) 01/03/2019    Atypical nevi 12/22/2016    Encounter for routine gynecological examination 05/10/2016    Celiac disease (HCC) 12/17/2015    Recurrent cold sores 11/19/2014    Presbyopia OU 10/07/2014    Age-related nuclear cataract of both eyes 10/07/2014        Medications - Current[1]     Vitals:    07/14/25 1254   BP: 122/68   Pulse: 72   VITALSBody mass index is 32.03 kg/m².      General: looks healthy  very pleasant      Affect  and  cognition  normal -  is a talker        Neck;  nl     Lungs: clear     Heart: Regular     Murmur none     Soft  abd                Tatianna was seen today for physical.    Diagnoses and all orders for this visit:    Class 1 obesity due to excess calories with serious comorbidity and body mass index (BMI) of 31.0 to 31.9 in adult    Obstructive sleep apnea    Hypertension, unspecified type    Anxiety    Atypical nevi  -     DERM - INTERNAL; Future    Other orders  -     ZEPBOUND 5 MG/0.5ML Subcutaneous Solution Auto-injector; Inject 5 mg into the skin once a week.       Assessment & Plan  Transient Ischemic Attack (TIA)  Experienced a significant neurological event two years ago, initially suspected stroke, later considered TIA. Extensive testing inconclusive.  -   -     Hypertension  Previously recorded high blood pressure, now well-controlled at 122/?. Weight loss and lifestyle modifications effective.  - Continue current lifestyle modifications and weight loss efforts.    Obesity  Actively losing weight with lifestyle changes and semaglutide, resulting in 14-pound loss. Improved mobility and decreased desire for unhealthy foods. No significant side effects except stomach  discomfort with alcohol.  - Continue semaglutide at current dose.  - Encourage continued physical activity and healthy eating habits.    Sleep Apnea  Uses CPAP machine with improved sleep quality. Weight loss expected to improve symptoms.  - Continue CPAP therapy.  - Monitor sleep quality and CPAP compliance.  - Re-evaluate CPAP settings as weight loss progresses.    Anxiety  Exacerbated by work stress. Currently on citalopram, effective in managing symptoms. Advised to continue through fall due to upcoming work stress.  - Continue citalopram.    Asthma  Rarely uses albuterol, only during severe pollen seasons or rare breathing issues.  - Use albuterol as needed for asthma symptoms.    Urinary Tract Infections (UTIs)  Previously on maintenance drug for ESBL.    Recurrent Cold Sores  Experiences frequent cold sores, uses Valtrex as needed.  - Continue Valtrex as needed for cold sore outbreaks.    Refer to derm for  nevi               This note was prepared using [employer] voice recognition dictation software and as a result, errors may occur. When identified, these errors have been corrected. While every attempt is made to correct errors during dictation, discrepancies may still exist          [1]   Current Outpatient Medications:     ZEPBOUND 5 MG/0.5ML Subcutaneous Solution Auto-injector, Inject 5 mg into the skin once a week., Disp: 6 mL, Rfl: 1    losartan 100 MG Oral Tab, Take 1 tablet (100 mg total) by mouth daily., Disp: 90 tablet, Rfl: 3    valACYclovir 1 G Oral Tab, Take 1 tablet (1,000 mg total) by mouth in the morning and 1 tablet (1,000 mg total) before bedtime., Disp: 30 tablet, Rfl: 5    citalopram 10 MG Oral Tab, Take 1 tablet (10 mg total) by mouth daily., Disp: 90 tablet, Rfl: 3    albuterol 108 (90 Base) MCG/ACT Inhalation Aero Soln, Inhale 2 puffs every 4-6 hours PRN, Disp: 1 each, Rfl: 0    Vitamin D, Cholecalciferol, 25 MCG (1000 UT) Oral Tab, Take 1 tablet by mouth daily., Disp: , Rfl:

## 2025-07-14 NOTE — PROGRESS NOTES
The following individual(s) verbally consented to be recorded using ambient AI listening technology and understand that they can each withdraw their consent to this listening technology at any point by asking the clinician to turn off or pause the recording: YES    Patient name: Tatianna Rivera  Additional names:

## (undated) DEVICE — MEDI-VAC NON-CONDUCTIVE SUCTION TUBING 6MM X 1.8M (6FT.) L: Brand: CARDINAL HEALTH

## (undated) DEVICE — ENDOSCOPY PACK - LOWER: Brand: MEDLINE INDUSTRIES, INC.

## (undated) DEVICE — KIT CLEAN ENDOKIT 1.1OZ GOWNX2

## (undated) DEVICE — SNARE CAPTIFLEX MICRO-OVL OLY

## (undated) DEVICE — SNARE OPTMZ PLPCTM TRP

## (undated) DEVICE — Device: Brand: DEFENDO AIR/WATER/SUCTION AND BIOPSY VALVE

## (undated) DEVICE — 60 ML SYRINGE REGULAR TIP: Brand: MONOJECT

## (undated) DEVICE — Device: Brand: DUAL NARE NASAL CANNULAE FEMALE LUER CON 7FT O2 TUBE

## (undated) DEVICE — KIT ENDO ORCAPOD 160/180/190

## (undated) DEVICE — TRAP SPEC REMOVAL ETRAP 15CM

## (undated) NOTE — LETTER
12/28/18        Nathalia Ho  [address] [facility] [address]      Dear Huan Fleming,    [facility] records indicate that you have outstanding lab work and or testing that was ordered for you and has not yet been completed:  Orders Placed This Encount

## (undated) NOTE — LETTER
2/8/2021          To Whom It May Concern:    Eddie Zoilaas is currently under my medical care and may not return to work at this time. Please excuse Praveena Lopez for 7 days. She may return to work on 02/15/2021.     If you require additional information pl

## (undated) NOTE — LETTER
[address] [place] [address], [place]  Authorization for Surgical Operation and Procedure                                                                                           I hereby authorize Roxana Chan MD, my physician and his/her assistants (if applicable), which may include medical students, residents, and/or fellows, to perform the following surgical operation/ procedure and administer such anesthesia as may be determined necessary by my physician: Operation/Procedure name (s) COLONOSCOPY on Trinh Dean   2. I recognize that during the surgical operation/procedure, unforeseen conditions may necessitate additional or different procedures than those listed above. I, therefore, further authorize and request that the above-named surgeon, assistants, or designees perform such procedures as are, in their judgment, necessary and desirable. 3.   My surgeon/physician has discussed prior to my surgery the potential benefits, risks and side effects of this procedure; the likelihood of achieving goals; and potential problems that might occur during recuperation. They also discussed reasonable alternatives to the procedure, including risks, benefits, and side effects related to the alternatives and risks related to not receiving this procedure. I have had all my questions answered and I acknowledge that no guarantee has been made as to the result that may be obtained. 4.   Should the need arise during my operation/procedure, which includes change of level of care prior to discharge, I also consent to the administration of blood and/or blood products. Further, I understand that despite careful testing and screening of blood or blood products by collecting agencies, I may still be subject to ill effects as a result of receiving a blood transfusion and/or blood products.   The following are some, but not all, of the potential risks that can occur: fever and allergic reactions, hemolytic reactions, transmission of diseases such as Hepatitis, AIDS and Cytomegalovirus (CMV) and fluid overload. In the event that I wish to have an autologous transfusion of my own blood, or a directed donor transfusion, I will discuss this with my physician. Check only if Refusing Blood or Blood Products  I understand refusal of blood or blood products as deemed necessary by my physician may have serious consequences to my condition to include possible death. I hereby assume responsibility for my refusal and release the hospital, its personnel, and my physicians from any responsibility for the consequences of my refusal.    o  Refuse   5. I authorize the use of any specimen, organs, tissues, body parts or foreign objects that may be removed from my body during the operation/procedure for diagnosis, research or teaching purposes and their subsequent disposal by hospital authorities. I also authorize the release of specimen test results and/or written reports to my treating physician on the hospital medical staff or other referring or consulting physicians involved in my care, at the discretion of the Pathologist or my treating physician. 6.   I consent to the photographing or videotaping of the operations or procedures to be performed, including appropriate portions of my body for medical, scientific, or educational purposes, provided my identity is not revealed by the pictures or by descriptive texts accompanying them. If the procedure has been photographed/videotaped, the surgeon will obtain the original picture, image, videotape or CD. The hospital will not be responsible for storage, release or maintenance of the picture, image, tape or CD.    7.   I consent to the presence of a  or observers in the operating room as deemed necessary by my physician or their designees.     8.   I recognize that in the event my procedure results in extended X-Ray/fluoroscopy time, I may develop a skin reaction. 9. If I have a Do Not Attempt Resuscitation (DNAR) order in place, that status will be suspended while in the operating room, procedural suite, and during the recovery period unless otherwise explicitly stated by me (or a person authorized to consent on my behalf). The surgeon or my attending physician will determine when the applicable recovery period ends for purposes of reinstating the DNAR order. 10. Patients having a sterilization procedure: I understand that if the procedure is successful the results will be permanent and it will therefore be impossible for me to inseminate, conceive, or bear children. I also understand that the procedure is intended to result in sterility, although the result has not been guaranteed. 11. I acknowledge that my physician has explained sedation/analgesia administration to me including the risk and benefits I consent to the administration of sedation/analgesia as may be necessary or desirable in the judgment of my physician. I CERTIFY THAT I HAVE READ AND FULLY UNDERSTAND THE ABOVE CONSENT TO OPERATION and/or OTHER PROCEDURE.     _________________________________________ _________________________________     ___________________________________  Signature of Patient     Signature of Responsible Person                   Printed Name of Responsible Person                              _________________________________________ ______________________________        ___________________________________  Signature of Witness         Date  Time         Relationship to Patient    STATEMENT OF PHYSICIAN My signature below affirms that prior to the time of the procedure; I have explained to the patient and/or his/her legal representative, the risks and benefits involved in the proposed treatment and any reasonable alternative to the proposed treatment.  I have also explained the risks and benefits involved in refusal of the proposed treatment and alternatives to the proposed treatment and have answered the patient's questions.  If I have a significant financial interest in a co-management agreement or a significant financial interest in any product or implant, or other significant relationship used in this procedure/surgery, I have disclosed this and had a discussion with my patient.     _______________________________________________________________ _____________________________  Angela Mcguire of Physician)                                                                                         (Date)                                   (Time)  Patient Name: Marbin Lea    : 2/15/1960   Printed: 2023      Medical Record #: [id number]                                              Page 1 of 1

## (undated) NOTE — LETTER
11/22/2022    Alisa Salvador        [address]        [place] [address]            Dear Alisa Salvador,      Our records indicate that you are due for an appointment for a Colonoscopy with Ai Brewster MD. Our doctors are booking out about 3-5 months for procedures. Please call our office to schedule a phone screening appointment to plan for the procedure(s). Your medical well-being is important to us. If your insurance requires a referral, please call your primary care office to request one.       Thank you,      The Physicians and Staff at [facility]

## (undated) NOTE — Clinical Note
TCM call completed. A TCM-HFU appointment is scheduled for 1/30/2023. The patient c/o headache and requested a different antibiotic. A TE was sent to the office for this. Thank you.

## (undated) NOTE — IP AVS SNAPSHOT
[address]  [address] [facility] [phone]                Discharge Summary   2/8/2017    Irene Gil           Admission Information        Provider Department    2/8/2017 Radu Rehman MD [facility] 5sw/Se These medications were sent to [place] [address] [address] [place], [place] - [address], [phone], [address] [address] [facility], [address] [facility] [address]     Phone:  [phone]    - Nitrofurantoin Exam - Established with Binh Briceño MD   [facility] [place])    [address] [address] [phone]              Immunization History as of 2/9/2017  Never Reviewed    HEP A 4/28/2 you to explore options for quitting.     - If you have concerns related to behavioral health issues or thoughts of harming yourself, contact [address] [facility] at [phone].     - If you don’t have insurance, Eugenie Geronimo Nitrofurantoin Monohyd Macro 100 MG Oral Cap         Use: Treat infections or suspected infection   Most common side effects:  Allergic reactions, rash, nausea, diarrhea   What to report to your healthcare team: Tolerance of medications, temperature, rash,

## (undated) NOTE — MR AVS SNAPSHOT
[facility] [place] [address] [place]  [address] [phone]               Thank you for choosing us for your health care visit with Maggie Conte MD.  We are glad to serve you and happy to provide you with this summary of yo Gluten Flour                 Today's Vital Signs     BP Pulse Height Weight BMI    109/71 mmHg 73 5' (1.524 m) 166 lb (75.297 kg) 32.42 kg/m2         Current Medications          This list is accurate as of: 1/16/17  5:53 PM.  Always use your most recent your appointment immediately. However, if you are unsure about the requirements for authorization, please wait 5-7 days and then contact your physician's office.  At that time, you will be provided with any authorization numbers or be assured that none are

## (undated) NOTE — MR AVS SNAPSHOT
[facility] [place] [address] [address] [address]  [phone]               Thank you for choosing us for your health care visit with Antonieta Cuevas MD.  We are glad to serve you and happy to provide you with this summary of [employer] [address] [place] [address]"[employer]" Drive for making healthy food choices  -   Enjoy your food, but eat less. Fully enjoy your food when eating. Don’t eat while distracted and slow down. Avoid over sized portions. Don’t eat while when you’re bored.

## (undated) NOTE — LETTER
AUTHORIZATION FOR SURGICAL OPERATION OR OTHER PROCEDURE    1. I hereby authorize Dr. Siddhartha Mayorga, and [facility] staff assigned to my case to perform the following operation and/or procedure at the [facility]:    colpo    2.   My physician Responsible person                          []  Spouse  In case of minor or                    [] Other  _____________   Incompetent name:  __________________________________________________                               (please print)      _____________

## (undated) NOTE — LETTER
AUTHORIZATION FOR SURGICAL OPERATION OR OTHER PROCEDURE    1.  I hereby [facility] Simone Alejandro, [facility] staff assigned to my case to perform the following operation and/or procedure at the [facility]:    ___________Cortisone Injection l Patient Name:  ______________________________________________________  (please print)      Patient signature:  ___________________________________________________             Relationship to Patient:           []  Parent    Responsible person

## (undated) NOTE — MR AVS SNAPSHOT
After Visit Summary   6/6/2024    Tatianna Rivera   MRN: [id number]           Visit Information     Date & Time  6/6/2024  8:45 AM Provider  Zeny Morfin CNM [facility] Dept. Phone  [phone]      Your Vitals Were  Most recent update: 6/6/2024  8:53 AM    BP   120/82          Pulse   69          Ht   60\"          Wt   178 lb          LMP    (LMP Unknown)             Breastfeeding   No    BMI   34.76 kg/m²         Allergies as of 6/6/2024  Review status set to Review Complete on 6/6/2024       Noted Reaction Type Reactions    Wheat Gluten 01/17/2023    NAUSEA AND VOMITING    diarrhea    Gluten Flour 12/17/2015        Seasonal 06/14/2017    OTHER (SEE COMMENTS)    Seasonal allergy      Your Current Medications        Dosage    valACYclovir 1 G Oral Tab Take 1 tablet (1,000 mg total) by mouth in the morning and 1 tablet (1,000 mg total) before bedtime.      Diagnoses for This Visit    Women's annual routine gynecological examination   [5167471]  -  Primary  Screening for cervical cancer   [930266]             Follow-up    Return in about 1 year (around 6/6/2025) for Next annual.     We Ordered the Following     Normal Orders This Visit    Hpv Dna  High Risk , Thin Prep Collect [[id number] CUSTOM]     THINPREP PAP SMEAR ONLY [[id number] CUSTOM]     ThinPrep PAP Smear [[id number] CUSTOM]     Future Labs/Procedures Expected by Expires    Hpv Dna  High Risk , Thin Prep Collect [[id number] CUSTOM]  6/6/2024 6/6/2025    ThinPrep PAP Smear [[id number] CUSTOM]  6/6/2024 6/6/2025      Future Appointments        Provider Department    7/17/2024 2:40 PM [address] [facility]    7/17/2024 3:00 PM [facility] DEXA [address]      Follow-up Instructions    Return in about 1 year (around 6/6/2025) for Next annual.                  Did you know that [facility] primary care physicians now offer Video Visits  through [employer] for adult patients for a variety of conditions such as allergies, back pain and cold symptoms? Skip the drive and waiting room and online chat with a doctor face-to-face using your web-cam enabled computer or mobile device wherever you are. Video Visits cost $50 and can be paid hassle-free using a credit, debit, or health savings card.  Not active on [employer]? Ask us how to get signed up today!          If you receive a survey from [employer], please take a few minutes to complete it and provide feedback. We strive to deliver the best patient experience and are looking for ways to make improvements. Your feedback will help us do so. For more information on [employer], please visit [email or web address]           No text in SmartText           No text in SmartText